# Patient Record
Sex: FEMALE | Race: BLACK OR AFRICAN AMERICAN | NOT HISPANIC OR LATINO | Employment: FULL TIME | URBAN - METROPOLITAN AREA
[De-identification: names, ages, dates, MRNs, and addresses within clinical notes are randomized per-mention and may not be internally consistent; named-entity substitution may affect disease eponyms.]

---
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
## 2020-06-19 ENCOUNTER — TRANSCRIBE ORDERS (OUTPATIENT)
Dept: ADMINISTRATIVE | Facility: HOSPITAL | Age: 20
End: 2020-06-19

## 2020-06-19 DIAGNOSIS — N63.0 LUMP OR MASS IN BREAST: Primary | ICD-10-CM

## 2020-06-26 ENCOUNTER — HOSPITAL ENCOUNTER (OUTPATIENT)
Dept: ULTRASOUND IMAGING | Facility: HOSPITAL | Age: 20
End: 2020-06-26

## 2020-07-07 ENCOUNTER — HOSPITAL ENCOUNTER (OUTPATIENT)
Dept: ULTRASOUND IMAGING | Facility: HOSPITAL | Age: 20
End: 2020-07-07

## 2020-08-11 ENCOUNTER — TRANSCRIBE ORDERS (OUTPATIENT)
Dept: LAB | Facility: HOSPITAL | Age: 20
End: 2020-08-11

## 2020-08-11 ENCOUNTER — LAB (OUTPATIENT)
Dept: LAB | Facility: HOSPITAL | Age: 20
End: 2020-08-11

## 2020-08-11 DIAGNOSIS — Z3A.08 8 WEEKS GESTATION OF PREGNANCY: Primary | ICD-10-CM

## 2020-08-11 DIAGNOSIS — Z34.81 PRENATAL CARE, SUBSEQUENT PREGNANCY, FIRST TRIMESTER: ICD-10-CM

## 2020-08-11 LAB
ABO GROUP BLD: NORMAL
AMPHET+METHAMPHET UR QL: NEGATIVE
AMPHETAMINES UR QL: NEGATIVE
BARBITURATES UR QL SCN: NEGATIVE
BASOPHILS # BLD AUTO: 0.04 10*3/MM3 (ref 0–0.2)
BASOPHILS NFR BLD AUTO: 0.4 % (ref 0–1.5)
BENZODIAZ UR QL SCN: NEGATIVE
BLD GP AB SCN SERPL QL: NEGATIVE
BUPRENORPHINE SERPL-MCNC: NEGATIVE NG/ML
CANNABINOIDS SERPL QL: POSITIVE
COCAINE UR QL: NEGATIVE
DEPRECATED RDW RBC AUTO: 38.2 FL (ref 37–54)
EOSINOPHIL # BLD AUTO: 0.1 10*3/MM3 (ref 0–0.4)
EOSINOPHIL NFR BLD AUTO: 1.1 % (ref 0.3–6.2)
ERYTHROCYTE [DISTWIDTH] IN BLOOD BY AUTOMATED COUNT: 11.9 % (ref 12.3–15.4)
GLUCOSE SERPL-MCNC: 69 MG/DL (ref 65–99)
HBV SURFACE AG SERPL QL IA: NORMAL
HCT VFR BLD AUTO: 37.3 % (ref 34–46.6)
HCV AB SER DONR QL: NORMAL
HGB BLD-MCNC: 12.7 G/DL (ref 12–15.9)
HIV1+2 AB SER QL: NORMAL
IMM GRANULOCYTES # BLD AUTO: 0.04 10*3/MM3 (ref 0–0.05)
IMM GRANULOCYTES NFR BLD AUTO: 0.4 % (ref 0–0.5)
LYMPHOCYTES # BLD AUTO: 1.96 10*3/MM3 (ref 0.7–3.1)
LYMPHOCYTES NFR BLD AUTO: 21.2 % (ref 19.6–45.3)
MCH RBC QN AUTO: 30 PG (ref 26.6–33)
MCHC RBC AUTO-ENTMCNC: 34 G/DL (ref 31.5–35.7)
MCV RBC AUTO: 88.2 FL (ref 79–97)
METHADONE UR QL SCN: NEGATIVE
MONOCYTES # BLD AUTO: 0.5 10*3/MM3 (ref 0.1–0.9)
MONOCYTES NFR BLD AUTO: 5.4 % (ref 5–12)
NEUTROPHILS NFR BLD AUTO: 6.62 10*3/MM3 (ref 1.7–7)
NEUTROPHILS NFR BLD AUTO: 71.5 % (ref 42.7–76)
NRBC BLD AUTO-RTO: 0.1 /100 WBC (ref 0–0.2)
OPIATES UR QL: NEGATIVE
OXYCODONE UR QL SCN: NEGATIVE
PCP UR QL SCN: NEGATIVE
PLATELET # BLD AUTO: 263 10*3/MM3 (ref 140–450)
PMV BLD AUTO: 10.9 FL (ref 6–12)
PROPOXYPH UR QL: NEGATIVE
RBC # BLD AUTO: 4.23 10*6/MM3 (ref 3.77–5.28)
RH BLD: POSITIVE
RPR SER QL: NORMAL
RUBV IGG SERPL IA-ACNC: NEGATIVE
TRICYCLICS UR QL SCN: NEGATIVE
TSH SERPL DL<=0.05 MIU/L-ACNC: 1.06 UIU/ML (ref 0.27–4.2)
WBC # BLD AUTO: 9.26 10*3/MM3 (ref 3.4–10.8)

## 2020-08-11 PROCEDURE — 80306 DRUG TEST PRSMV INSTRMNT: CPT | Performed by: ADVANCED PRACTICE MIDWIFE

## 2020-08-11 PROCEDURE — 82947 ASSAY GLUCOSE BLOOD QUANT: CPT | Performed by: ADVANCED PRACTICE MIDWIFE

## 2020-08-11 PROCEDURE — 84443 ASSAY THYROID STIM HORMONE: CPT | Performed by: ADVANCED PRACTICE MIDWIFE

## 2020-08-11 PROCEDURE — 80081 OBSTETRIC PANEL INC HIV TSTG: CPT | Performed by: ADVANCED PRACTICE MIDWIFE

## 2020-08-11 PROCEDURE — 36415 COLL VENOUS BLD VENIPUNCTURE: CPT | Performed by: ADVANCED PRACTICE MIDWIFE

## 2020-08-11 PROCEDURE — 86803 HEPATITIS C AB TEST: CPT | Performed by: ADVANCED PRACTICE MIDWIFE

## 2020-08-11 PROCEDURE — 81003 URINALYSIS AUTO W/O SCOPE: CPT | Performed by: ADVANCED PRACTICE MIDWIFE

## 2020-08-12 LAB
BILIRUB UR QL STRIP: NEGATIVE
CLARITY UR: CLEAR
COLOR UR: YELLOW
GLUCOSE UR STRIP-MCNC: NEGATIVE MG/DL
HGB UR QL STRIP.AUTO: NEGATIVE
HOLD SPECIMEN: NORMAL
KETONES UR QL STRIP: ABNORMAL
LEUKOCYTE ESTERASE UR QL STRIP.AUTO: NEGATIVE
NITRITE UR QL STRIP: NEGATIVE
PH UR STRIP.AUTO: 6 [PH] (ref 5–8)
PROT UR QL STRIP: ABNORMAL
SP GR UR STRIP: >=1.03 (ref 1–1.03)
UROBILINOGEN UR QL STRIP: ABNORMAL

## 2020-08-13 LAB
EXTERNAL CHLAMYDIA SCREEN: NEGATIVE
EXTERNAL GONORRHEA SCREEN: NEGATIVE

## 2020-09-05 ENCOUNTER — HOSPITAL ENCOUNTER (EMERGENCY)
Facility: HOSPITAL | Age: 20
Discharge: HOME | End: 2020-09-05
Attending: EMERGENCY MEDICINE | Admitting: EMERGENCY MEDICINE

## 2020-09-05 VITALS
BODY MASS INDEX: 28.16 KG/M2 | HEIGHT: 62 IN | TEMPERATURE: 98.1 F | HEART RATE: 85 BPM | DIASTOLIC BLOOD PRESSURE: 60 MMHG | SYSTOLIC BLOOD PRESSURE: 102 MMHG | RESPIRATION RATE: 16 BRPM | WEIGHT: 153 LBS | OXYGEN SATURATION: 99 %

## 2020-09-05 DIAGNOSIS — R19.7 DIARRHEA DURING PREGNANCY: Primary | ICD-10-CM

## 2020-09-05 DIAGNOSIS — O26.899 DIARRHEA DURING PREGNANCY: Primary | ICD-10-CM

## 2020-09-05 DIAGNOSIS — R19.7 BLOODY DIARRHEA: ICD-10-CM

## 2020-09-05 DIAGNOSIS — N39.0 ACUTE URINARY TRACT INFECTION: ICD-10-CM

## 2020-09-05 LAB
ALBUMIN SERPL-MCNC: 3.6 G/DL (ref 3.5–5.2)
ALBUMIN/GLOB SERPL: 1.1 G/DL
ALP SERPL-CCNC: 107 U/L (ref 39–117)
ALT SERPL W P-5'-P-CCNC: 12 U/L (ref 1–33)
ANION GAP SERPL CALCULATED.3IONS-SCNC: 11 MMOL/L (ref 5–15)
AST SERPL-CCNC: 21 U/L (ref 1–32)
BACTERIA UR QL AUTO: ABNORMAL /HPF
BASOPHILS # BLD AUTO: 0.03 10*3/MM3 (ref 0–0.2)
BASOPHILS NFR BLD AUTO: 0.2 % (ref 0–1.5)
BILIRUB SERPL-MCNC: 0.2 MG/DL (ref 0–1.2)
BILIRUB UR QL STRIP: NEGATIVE
BUN SERPL-MCNC: 9 MG/DL (ref 6–20)
BUN/CREAT SERPL: 13.6 (ref 7–25)
CALCIUM SPEC-SCNC: 9.8 MG/DL (ref 8.6–10.5)
CHLORIDE SERPL-SCNC: 102 MMOL/L (ref 98–107)
CLARITY UR: ABNORMAL
CO2 SERPL-SCNC: 23 MMOL/L (ref 22–29)
COLOR UR: ABNORMAL
CREAT SERPL-MCNC: 0.66 MG/DL (ref 0.57–1)
DEPRECATED RDW RBC AUTO: 39.5 FL (ref 37–54)
EOSINOPHIL # BLD AUTO: 0.09 10*3/MM3 (ref 0–0.4)
EOSINOPHIL NFR BLD AUTO: 0.6 % (ref 0.3–6.2)
ERYTHROCYTE [DISTWIDTH] IN BLOOD BY AUTOMATED COUNT: 12.3 % (ref 12.3–15.4)
GFR SERPL CREATININE-BSD FRML MDRD: 115 ML/MIN/1.73
GFR SERPL CREATININE-BSD FRML MDRD: 140 ML/MIN/1.73
GLOBULIN UR ELPH-MCNC: 3.3 GM/DL
GLUCOSE SERPL-MCNC: 85 MG/DL (ref 65–99)
GLUCOSE UR STRIP-MCNC: NEGATIVE MG/DL
HCT VFR BLD AUTO: 38 % (ref 34–46.6)
HGB BLD-MCNC: 13 G/DL (ref 12–15.9)
HGB UR QL STRIP.AUTO: ABNORMAL
HYALINE CASTS UR QL AUTO: ABNORMAL /LPF
IMM GRANULOCYTES # BLD AUTO: 0.05 10*3/MM3 (ref 0–0.05)
IMM GRANULOCYTES NFR BLD AUTO: 0.3 % (ref 0–0.5)
KETONES UR QL STRIP: ABNORMAL
LEUKOCYTE ESTERASE UR QL STRIP.AUTO: ABNORMAL
LYMPHOCYTES # BLD AUTO: 1.86 10*3/MM3 (ref 0.7–3.1)
LYMPHOCYTES NFR BLD AUTO: 12.6 % (ref 19.6–45.3)
MCH RBC QN AUTO: 29.8 PG (ref 26.6–33)
MCHC RBC AUTO-ENTMCNC: 34.2 G/DL (ref 31.5–35.7)
MCV RBC AUTO: 87.2 FL (ref 79–97)
MONOCYTES # BLD AUTO: 0.93 10*3/MM3 (ref 0.1–0.9)
MONOCYTES NFR BLD AUTO: 6.3 % (ref 5–12)
MUCOUS THREADS URNS QL MICRO: ABNORMAL /HPF
NEUTROPHILS NFR BLD AUTO: 11.77 10*3/MM3 (ref 1.7–7)
NEUTROPHILS NFR BLD AUTO: 80 % (ref 42.7–76)
NITRITE UR QL STRIP: NEGATIVE
NRBC BLD AUTO-RTO: 0 /100 WBC (ref 0–0.2)
PH UR STRIP.AUTO: 5.5 [PH] (ref 5–8)
PLATELET # BLD AUTO: 245 10*3/MM3 (ref 140–450)
PMV BLD AUTO: 9.9 FL (ref 6–12)
POTASSIUM SERPL-SCNC: 4.1 MMOL/L (ref 3.5–5.2)
PROT SERPL-MCNC: 6.9 G/DL (ref 6–8.5)
PROT UR QL STRIP: ABNORMAL
RBC # BLD AUTO: 4.36 10*6/MM3 (ref 3.77–5.28)
RBC # UR: ABNORMAL /HPF
REF LAB TEST METHOD: ABNORMAL
SODIUM SERPL-SCNC: 136 MMOL/L (ref 136–145)
SP GR UR STRIP: 1.04 (ref 1–1.03)
SQUAMOUS #/AREA URNS HPF: ABNORMAL /HPF
UROBILINOGEN UR QL STRIP: ABNORMAL
WBC # BLD AUTO: 14.73 10*3/MM3 (ref 3.4–10.8)
WBC UR QL AUTO: ABNORMAL /HPF

## 2020-09-05 PROCEDURE — 99284 EMERGENCY DEPT VISIT MOD MDM: CPT

## 2020-09-05 PROCEDURE — 25010000002 CEFTRIAXONE PER 250 MG: Performed by: EMERGENCY MEDICINE

## 2020-09-05 PROCEDURE — 80053 COMPREHEN METABOLIC PANEL: CPT | Performed by: EMERGENCY MEDICINE

## 2020-09-05 PROCEDURE — 81001 URINALYSIS AUTO W/SCOPE: CPT | Performed by: EMERGENCY MEDICINE

## 2020-09-05 PROCEDURE — 96365 THER/PROPH/DIAG IV INF INIT: CPT

## 2020-09-05 PROCEDURE — 85025 COMPLETE CBC W/AUTO DIFF WBC: CPT | Performed by: EMERGENCY MEDICINE

## 2020-09-05 PROCEDURE — 87086 URINE CULTURE/COLONY COUNT: CPT | Performed by: EMERGENCY MEDICINE

## 2020-09-05 RX ORDER — SODIUM CHLORIDE 0.9 % (FLUSH) 0.9 %
10 SYRINGE (ML) INJECTION AS NEEDED
Status: DISCONTINUED | OUTPATIENT
Start: 2020-09-05 | End: 2020-09-05 | Stop reason: HOSPADM

## 2020-09-05 RX ORDER — CEFDINIR 300 MG/1
300 CAPSULE ORAL 2 TIMES DAILY
Qty: 14 CAPSULE | Refills: 0 | Status: SHIPPED | OUTPATIENT
Start: 2020-09-05 | End: 2020-09-12

## 2020-09-05 RX ADMIN — SODIUM CHLORIDE 1000 ML: 9 INJECTION, SOLUTION INTRAVENOUS at 12:25

## 2020-09-05 RX ADMIN — CEFTRIAXONE SODIUM 2 G: 2 INJECTION, POWDER, FOR SOLUTION INTRAMUSCULAR; INTRAVENOUS at 12:24

## 2020-09-05 NOTE — DISCHARGE INSTRUCTIONS
The patient is advised to rest and drink plenty of fluids.    Take antibiotics as prescribed for treatment of urinary tract infection.    Return to the ER with any further concern or worsening of symptoms.

## 2020-09-05 NOTE — ED PROVIDER NOTES
Subjective   19-year-old female presents with a complaint of diarrhea and bloody appearance to the diarrhea.  The patient reports that beginning yesterday she had several episodes of diarrhea.  Throughout the night into this morning she started to notice some streaks of blood in the diarrhea.  This is just been present over the last 2 episodes.  She feels like the overall frequency of the diarrhea is actually decreasing.  She denies any significant abdominal pain.  She does report some hot sensation with urination but has been present for several weeks.  She reports that she was previously prescribed antibiotics for this but has not initiated those antibiotics.  She denies any vaginal bleeding or blood in her urine.  She denies any vaginal leakage of fluid.  She is currently G2, P1 at 11 weeks gestation with no current complications with her current pregnancy.  She denies any fever, body aches, or chills.  She denies any chest pain, cough, sore throat, congestion, rhinorrhea, shortness of breath, change in sense of taste or smell.  No previous surgical intervention of the abdomen.  No other reported aggravating, alleviating, or associated symptoms.          Review of Systems   Constitutional: Negative for chills, fatigue and fever.   HENT: Negative for congestion, ear pain, postnasal drip, sinus pressure and sore throat.    Eyes: Negative for pain, redness and visual disturbance.   Respiratory: Negative for cough, chest tightness and shortness of breath.    Cardiovascular: Negative for chest pain, palpitations and leg swelling.   Gastrointestinal: Positive for blood in stool and diarrhea. Negative for abdominal pain, anal bleeding, nausea and vomiting.   Endocrine: Negative for polydipsia and polyuria.   Genitourinary: Positive for dysuria. Negative for difficulty urinating, frequency and urgency.   Musculoskeletal: Negative for arthralgias, back pain and neck pain.   Skin: Negative for pallor and rash.    Allergic/Immunologic: Negative for environmental allergies and immunocompromised state.   Neurological: Negative for dizziness, weakness and headaches.   Hematological: Negative for adenopathy.   Psychiatric/Behavioral: Negative for confusion, self-injury and suicidal ideas. The patient is not nervous/anxious.    All other systems reviewed and are negative.      Past Medical History:   Diagnosis Date   • Chlamydia        No Known Allergies    History reviewed. No pertinent surgical history.    Family History   Problem Relation Age of Onset   • Diabetes Maternal Grandfather        Social History     Socioeconomic History   • Marital status: Single     Spouse name: Not on file   • Number of children: Not on file   • Years of education: Not on file   • Highest education level: Not on file   Tobacco Use   • Smoking status: Never Smoker   • Smokeless tobacco: Never Used   Substance and Sexual Activity   • Alcohol use: Yes     Frequency: 2-4 times a month   • Drug use: Yes     Types: Marijuana   • Sexual activity: Not Currently     Partners: Female, Male     Birth control/protection: IUD           Objective   Physical Exam   Constitutional: She is oriented to person, place, and time. She appears well-developed and well-nourished.  Non-toxic appearance. No distress.   HENT:   Head: Normocephalic and atraumatic.   Right Ear: External ear normal.   Left Ear: External ear normal.   Nose: Nose normal.   Eyes: Pupils are equal, round, and reactive to light. EOM and lids are normal.   Neck: Normal range of motion. Neck supple. No tracheal deviation present.   Cardiovascular: Normal rate, regular rhythm and normal heart sounds. Exam reveals no gallop, no friction rub and no decreased pulses.   No murmur heard.  Pulmonary/Chest: Effort normal and breath sounds normal. No respiratory distress. She has no decreased breath sounds. She has no wheezes. She has no rhonchi. She has no rales.   Abdominal: Soft. Normal appearance and  bowel sounds are normal. There is no tenderness. There is no rebound, no guarding, no tenderness at McBurney's point and negative Kim's sign.       Musculoskeletal: Normal range of motion. She exhibits no deformity.   Lymphadenopathy:     She has no cervical adenopathy.   Neurological: She is alert and oriented to person, place, and time. She has normal strength. No cranial nerve deficit or sensory deficit.   Skin: Skin is warm and dry. No rash noted. She is not diaphoretic.   Psychiatric: She has a normal mood and affect. Her speech is normal and behavior is normal. Judgment and thought content normal. Cognition and memory are normal.   Nursing note and vitals reviewed.      Procedures           ED Course                                           MDM  Number of Diagnoses or Management Options  Acute urinary tract infection: new and requires workup  Bloody diarrhea: new and requires workup  Diarrhea during pregnancy: new and requires workup  Diagnosis management comments: Urine shows bacteria, but also shows a significant number of squamous epithelial cells.  Given the fact that the patient is pregnant and has had dysuria for several weeks I feel it is appropriate to initiate a course of antibiotics.  I will prescribe Omnicef upon discharge.    Patient's vital signs have remained normal, oxygen saturation is normal, no respiratory symptoms.  She has not had any episodes of diarrhea since being here in the ER.  She does not appear anemic, and I do not feel more detail evaluation for diarrhea is warranted at this given time.  Felt to be of infectious colitis.      I discussed good oral fluid intake, rest, Tylenol as needed for pain, and using prescribed antibiotics as prescribed.    She is advised to follow-up with primary care physician/OB within the next 2 to 3 days.    Advised return to the ER with any further concern.           Amount and/or Complexity of Data Reviewed  Clinical lab tests: ordered and  reviewed  Review and summarize past medical records: yes  Independent visualization of images, tracings, or specimens: yes        Final diagnoses:   Diarrhea during pregnancy   Bloody diarrhea   Acute urinary tract infection            Black Montoya MD  09/05/20 [id number]

## 2020-09-06 LAB — BACTERIA SPEC AEROBE CULT: NO GROWTH

## 2020-09-08 ENCOUNTER — LAB REQUISITION (OUTPATIENT)
Dept: LAB | Facility: HOSPITAL | Age: 20
End: 2020-09-08

## 2020-09-08 DIAGNOSIS — Z00.00 ROUTINE GENERAL MEDICAL EXAMINATION AT A HEALTH CARE FACILITY: ICD-10-CM

## 2020-10-05 ENCOUNTER — OFFICE VISIT (OUTPATIENT)
Dept: OBSTETRICS AND GYNECOLOGY | Facility: CLINIC | Age: 20
End: 2020-10-05

## 2020-10-05 VITALS — RESPIRATION RATE: 14 BRPM | HEIGHT: 62 IN | WEIGHT: 152.8 LBS | BODY MASS INDEX: 28.12 KG/M2

## 2020-10-05 DIAGNOSIS — Z34.02 FIRST PREGNANCY, SECOND TRIMESTER: Primary | ICD-10-CM

## 2020-10-05 NOTE — PROGRESS NOTES
Patient is pregnant and seeing [facility].  She had an appointment today and is really not understand why she is here.  She was discharged without being seen.    Abdulkadir Mcallister MD

## 2020-10-08 ENCOUNTER — LAB (OUTPATIENT)
Dept: LAB | Facility: HOSPITAL | Age: 20
End: 2020-10-08

## 2020-10-08 ENCOUNTER — TRANSCRIBE ORDERS (OUTPATIENT)
Dept: LAB | Facility: HOSPITAL | Age: 20
End: 2020-10-08

## 2020-10-08 DIAGNOSIS — R30.0 DYSURIA: Primary | ICD-10-CM

## 2020-10-08 DIAGNOSIS — R30.0 DYSURIA: ICD-10-CM

## 2020-10-08 PROCEDURE — 87086 URINE CULTURE/COLONY COUNT: CPT

## 2020-10-09 LAB — BACTERIA SPEC AEROBE CULT: NO GROWTH

## 2020-11-16 ENCOUNTER — HOSPITAL ENCOUNTER (OUTPATIENT)
Facility: HOSPITAL | Age: 20
End: 2020-11-16
Attending: OBSTETRICS & GYNECOLOGY | Admitting: OBSTETRICS & GYNECOLOGY

## 2020-11-16 ENCOUNTER — HOSPITAL ENCOUNTER (OUTPATIENT)
Facility: HOSPITAL | Age: 20
Setting detail: OBSERVATION
Discharge: HOME | End: 2020-11-16
Attending: ADVANCED PRACTICE MIDWIFE | Admitting: OBSTETRICS & GYNECOLOGY

## 2020-11-16 ENCOUNTER — HOSPITAL ENCOUNTER (EMERGENCY)
Facility: HOSPITAL | Age: 20
End: 2020-11-16

## 2020-11-16 VITALS
SYSTOLIC BLOOD PRESSURE: 106 MMHG | WEIGHT: 154 LBS | HEIGHT: 62 IN | OXYGEN SATURATION: 98 % | DIASTOLIC BLOOD PRESSURE: 60 MMHG | TEMPERATURE: 98.2 F | RESPIRATION RATE: 16 BRPM | BODY MASS INDEX: 28.34 KG/M2 | HEART RATE: 77 BPM

## 2020-11-16 PROBLEM — Z34.90 PREGNANCY: Status: ACTIVE | Noted: 2020-11-16

## 2020-11-16 LAB
BACTERIA UR QL AUTO: ABNORMAL /HPF
BILIRUB UR QL STRIP: NEGATIVE
BILIRUB UR QL STRIP: NEGATIVE
CLARITY UR: ABNORMAL
CLARITY UR: CLEAR
COLOR UR: YELLOW
COLOR UR: YELLOW
GLUCOSE UR STRIP-MCNC: NEGATIVE MG/DL
GLUCOSE UR STRIP-MCNC: NEGATIVE MG/DL
HGB UR QL STRIP.AUTO: NEGATIVE
HGB UR QL STRIP.AUTO: NEGATIVE
HYALINE CASTS UR QL AUTO: ABNORMAL /LPF
KETONES UR QL STRIP: NEGATIVE
KETONES UR QL STRIP: NEGATIVE
LEUKOCYTE ESTERASE UR QL STRIP.AUTO: ABNORMAL
LEUKOCYTE ESTERASE UR QL STRIP.AUTO: NEGATIVE
NITRITE UR QL STRIP: NEGATIVE
NITRITE UR QL STRIP: NEGATIVE
PH UR STRIP.AUTO: 7.5 [PH] (ref 5–8)
PH UR STRIP.AUTO: >=9 [PH] (ref 5–8)
PROT UR QL STRIP: ABNORMAL
PROT UR QL STRIP: NEGATIVE
RBC # UR: ABNORMAL /HPF
REF LAB TEST METHOD: ABNORMAL
SP GR UR STRIP: 1.01 (ref 1–1.03)
SP GR UR STRIP: 1.03 (ref 1–1.03)
SQUAMOUS #/AREA URNS HPF: ABNORMAL /HPF
UROBILINOGEN UR QL STRIP: ABNORMAL
UROBILINOGEN UR QL STRIP: NORMAL
WBC UR QL AUTO: ABNORMAL /HPF

## 2020-11-16 PROCEDURE — 81003 URINALYSIS AUTO W/O SCOPE: CPT | Performed by: OBSTETRICS & GYNECOLOGY

## 2020-11-16 PROCEDURE — P9612 CATHETERIZE FOR URINE SPEC: HCPCS

## 2020-11-16 PROCEDURE — G0378 HOSPITAL OBSERVATION PER HR: HCPCS

## 2020-11-16 PROCEDURE — 81001 URINALYSIS AUTO W/SCOPE: CPT | Performed by: OBSTETRICS & GYNECOLOGY

## 2020-11-16 PROCEDURE — 59025 FETAL NON-STRESS TEST: CPT

## 2020-11-16 PROCEDURE — 99218 PR INITIAL OBSERVATION CARE/DAY 30 MINUTES: CPT | Performed by: OBSTETRICS & GYNECOLOGY

## 2020-11-16 PROCEDURE — 87086 URINE CULTURE/COLONY COUNT: CPT | Performed by: OBSTETRICS & GYNECOLOGY

## 2020-11-16 NOTE — H&P
[facility]  Obstetric History and Physical    Referring Provider: Kayleen Cabezas MD      Chief Complaint   Patient presents with   • Abdominal Pain       Subjective     Patient is a 19 y.o. female  currently at 22w1d, who presents with C/O abdominal pain.  She reports mild irregular lower abdominal pain/menstrual type cramping off and on during the pregnancy.  Patient denies leaking of fluid, vaginal bleeding, recent trauma, short of breath, chest pain, loss of taste or smell, or urinary symptoms.  Prenatal care by Bronwyn Manrique CNM.   care has been uncomplicated thus far.  OB history significant for previous term vaginal delivery..    .    The following portions of the patients history were reviewed and updated as appropriate: current medications, allergies, past medical history, past surgical history, past family history, past social history and problem list .       Prenatal Information:   Maternal Prenatal Labs  Blood Type No results found for: ABO   Rh Status No results found for: RH   Antibody Screen No results found for: ABSCRN   Gonnorhea No results found for: GCCX   Chlamydia No results found for: CLAMYDCU   RPR No results found for: RPR   Syphilis Antibody No results found for: SYPHILIS   Rubella No results found for: RUBELLAIGGIN   Hepatitis B Surface Antigen No results found for: HEPBSAG   HIV-1 Antibody No results found for: LABHIV1   Hepatitis C Antibody No results found for: HEPCAB   Rapid Urin Drug Screen No results found for: AMPMETHU, BARBITSCNUR, LABBENZSCN, LABMETHSCN, LABOPIASCN, THCURSCR, COCAINEUR, AMPHETSCREEN, PROPOXSCN, BUPRENORSCNU, METAMPSCNUR, OXYCODONESCN, TRICYCLICSCN   Group B Strep Culture No results found for: GBSANTIGEN           External Prenatal Results     Pregnancy Outside Results - Transcribed From Office Records - See Scanned Records For Details     Test Value Date Time    Hgb 13.0 g/dL 20 1224      12.7 g/dL 20 1256    Hct 38.0 % 20 1224       37.3 % 20 1256    ABO O  20 1256    Rh Positive  20 1256    Antibody Screen Negative  20 1256    Glucose Fasting GTT       Glucose Tolerance Test 1 hour       Glucose Tolerance Test 3 hour       Gonorrhea (discrete)       Chlamydia (discrete)       RPR Non-Reactive  20 1256    VDRL       Syphilis Antibody       Rubella Negative  20 1256    HBsAg Non-Reactive  20 1256    Herpes Simplex Virus PCR       Herpes Simplex VIrus Culture       HIV Non-Reactive  20 1256    Hep C RNA Quant PCR       Hep C Antibody Non-Reactive  20 1256    AFP       Group B Strep       GBS Susceptibility to Clindamycin       GBS Susceptibility to Erythromycin       Fetal Fibronectin       Genetic Testing, Maternal Blood             Drug Screening     Test Value Date Time    Urine Drug Screen       Amphetamine Screen Negative  20 1256    Barbiturate Screen Negative  20 1256    Benzodiazepine Screen Negative  20 1256    Methadone Screen Negative  20 1256    Phencyclidine Screen Negative  20 1256    Opiates Screen Negative  20 1256    THC Screen Positive  20 1256    Cocaine Screen       Propoxyphene Screen Negative  20 1256    Buprenorphine Screen Negative  20 1256    Methamphetamine Screen       Oxycodone Screen Negative  20 1256    Tricyclic Antidepressants Screen Negative  20 1256                  Past OB History:       OB History    Para Term  AB Living   2 1 1 0 0 1   SAB TAB Ectopic Molar Multiple Live Births   0 0 0 0 0 1      # Outcome Date GA Lbr Dorian/2nd Weight Sex Delivery Anes PTL Lv   2 Current            1 Term 10/16/15   3289 g (7 lb 4 oz) M Vag-Spont EPI  ERAN      Name: Gianluca       Past Medical History: Past Medical History:   Diagnosis Date   • Chlamydia    • Gonorrhea       Past Surgical History History reviewed. No pertinent surgical history.   Family History: Family History   Problem Relation  Age of Onset   • Diabetes Maternal Grandfather       Social History:  reports that she has never smoked. She has never used smokeless tobacco.   reports previous alcohol use.   reports current drug use. Drug: Marijuana.                   General ROS Negative Findings:Headaches, Visual Changes, Epigastric pain, Anorexia, Nausia/Vomiting, ROM and Vaginal Bleeding    ROS     All other systems have been reviewed and are neg  Objective       Vital Signs Range for the last 24 hours  Temperature: Temp:  [98.2 °F (36.8 °C)] 98.2 °F (36.8 °C)   Temp Source: Temp src: Oral   BP: BP: (106)/(60) 106/60   Pulse: Heart Rate:  [77] 77   Respirations: Resp:  [16] 16   SPO2: SpO2:  [98 %] 98 %   O2 Amount (l/min):     O2 Devices Device (Oxygen Therapy): room air   Weight: Weight:  [69.9 kg (154 lb)] 69.9 kg (154 lb)     Physical Examination:   General:   alert, appears stated age and cooperative   Skin:   normal   HEENT:  Sclera clear   Lungs:   clear to auscultation bilaterally   Heart:   regular rate and rhythm, S1, S2 normal, no murmur, click, rub or gallop   Abdomen:  Soft, gravid uterus, no guarding, rebound, negative CVA tenderness.  Bilateral round ligament tenderness elicited.  Benign exam   Lower Extremities  no edema, no calf tenderness, full range of motion   Pelvis:  External genitalia: normal general appearance  Uterus: enlarged     Neuro: No focal deficits noted DTR 2+4 no clonus    Presentation:    Cervix: Exam by: Method: sterile exam per physician(Dr. Carrasco)   Dilation:  Closed   Effacement:  Take/firm   Station:  Not engaged  No blood noted on glove       Fetal Heart Rate Assessment   Method: Fetal HR Assessment Method: external   Beats/min: Fetal HR (beats/min): 140   Baseline: Fetal Heart Baseline Rate: normal range   Varibility: Fetal HR Variability: moderate (amplitude range 6 to 25 bpm)   Accels: Fetal HR Accelerations: greater than/equal to 10 bpm (32 wks gest or less), lasts at least 10 seconds (32 wks  gest or less)   Decels: Fetal HR Decelerations: absent   Tracing Category:       Uterine Assessment   Method: Method: palpation   Frequency (min):     Ctx Count in 10 min:     Duration:     Intensity: Contraction Intensity: no contractions   Intensity by IUPC:     Resting Tone: Uterine Resting Tone: soft by palpation   Resting Tone by IUPC:     Barton Units:       Laboratory Results:   Lab Results (last 24 hours)     Procedure Component Value Units Date/Time    Urinalysis With Culture If Indicated - Urine, Catheter [[id number]]  (Normal) Collected: 11/16/20 1454    Specimen: Urine, Catheter Updated: 11/16/20 1513     Color, UA Yellow     Appearance, UA Clear     pH, UA 7.5     Specific Gravity, UA 1.013     Glucose, UA Negative     Ketones, UA Negative     Bilirubin, UA Negative     Blood, UA Negative     Protein, UA Negative     Leuk Esterase, UA Negative     Nitrite, UA Negative     Urobilinogen, UA 0.2 E.U./dL    Narrative:      Urine microscopic not indicated.    Urinalysis With Culture If Indicated - Urine, Clean Catch [[id number]]  (Abnormal) Collected: 11/16/20 1405    Specimen: Urine, Clean Catch Updated: 11/16/20 1441     Color, UA Yellow     Appearance, UA Turbid     pH, UA >=9.0     Specific Gravity, UA 1.028     Glucose, UA Negative     Ketones, UA Negative     Bilirubin, UA Negative     Blood, UA Negative     Protein, UA 30 mg/dL (1+)     Leuk Esterase, UA Small (1+)     Nitrite, UA Negative     Urobilinogen, UA 1.0 E.U./dL    Urinalysis, Microscopic Only - Urine, Clean Catch [[id number]]  (Abnormal) Collected: 11/16/20 1405    Specimen: Urine, Clean Catch Updated: 11/16/20 1441     RBC, UA 0-2 /HPF      WBC, UA 6-12 /HPF      Bacteria, UA 2+ /HPF      Squamous Epithelial Cells, UA 7-12 /HPF      Hyaline Casts, UA 0-6 /LPF      Methodology Automated Microscopy    Urine Culture - Urine, Urine, Clean Catch [[id number]] Collected: 11/16/20 1405    Specimen: Urine, Clean Catch Updated: 11/16/20 1441             Radiology Review:   Imaging Results (Last 24 Hours)     ** No results found for the last 24 hours. **        Other Studies: Repeat urine catheterized within normal limits    Assessment/Plan       Pregnancy        Assessment:  1.  Intrauterine pregnancy at 22w1d weeks gestation with reassuring fetal status.    2.  No evidence of labor rupture membranes  3.  Discomforts of pregnancy  4.  No evidence of UTI    Plan:  1.  Discharged home, kick count,  instructions, discussed patient nutrition, hydration, and activity.  Follow-up with OB provider routinely.  2. Plan of care has been reviewed with patient.  3.  Risks, benefits of treatment plan have been discussed.  4.  All questions have been answered.  5      Justin Carrasco, DO  2020  16:03 EST

## 2020-11-17 LAB — BACTERIA SPEC AEROBE CULT: NO GROWTH

## 2020-12-31 ENCOUNTER — TRANSCRIBE ORDERS (OUTPATIENT)
Dept: LAB | Facility: HOSPITAL | Age: 20
End: 2020-12-31

## 2020-12-31 ENCOUNTER — LAB (OUTPATIENT)
Dept: LAB | Facility: HOSPITAL | Age: 20
End: 2020-12-31

## 2020-12-31 DIAGNOSIS — Z34.82 PRENATAL CARE, SUBSEQUENT PREGNANCY, SECOND TRIMESTER: Primary | ICD-10-CM

## 2020-12-31 DIAGNOSIS — Z3A.28 28 WEEKS GESTATION OF PREGNANCY: ICD-10-CM

## 2020-12-31 DIAGNOSIS — Z34.82 PRENATAL CARE, SUBSEQUENT PREGNANCY, SECOND TRIMESTER: ICD-10-CM

## 2020-12-31 LAB
BLD GP AB SCN SERPL QL: NEGATIVE
DEPRECATED RDW RBC AUTO: 40.5 FL (ref 37–54)
ERYTHROCYTE [DISTWIDTH] IN BLOOD BY AUTOMATED COUNT: 12.2 % (ref 12.3–15.4)
EXTERNAL GTT 1 HOUR: 83
GLUCOSE 1H P 100 G GLC PO SERPL-MCNC: 83 MG/DL (ref 65–140)
HCT VFR BLD AUTO: 35.8 % (ref 34–46.6)
HGB BLD-MCNC: 12 G/DL (ref 12–15.9)
MCH RBC QN AUTO: 30 PG (ref 26.6–33)
MCHC RBC AUTO-ENTMCNC: 33.5 G/DL (ref 31.5–35.7)
MCV RBC AUTO: 89.5 FL (ref 79–97)
PLATELET # BLD AUTO: 261 10*3/MM3 (ref 140–450)
PMV BLD AUTO: 10.1 FL (ref 6–12)
RBC # BLD AUTO: 4 10*6/MM3 (ref 3.77–5.28)
WBC # BLD AUTO: 10.91 10*3/MM3 (ref 3.4–10.8)

## 2020-12-31 PROCEDURE — 86850 RBC ANTIBODY SCREEN: CPT

## 2020-12-31 PROCEDURE — 82950 GLUCOSE TEST: CPT

## 2020-12-31 PROCEDURE — 36415 COLL VENOUS BLD VENIPUNCTURE: CPT

## 2020-12-31 PROCEDURE — 85027 COMPLETE CBC AUTOMATED: CPT

## 2021-02-17 ENCOUNTER — LAB (OUTPATIENT)
Dept: LAB | Facility: HOSPITAL | Age: 21
End: 2021-02-17

## 2021-02-17 ENCOUNTER — TRANSCRIBE ORDERS (OUTPATIENT)
Dept: LAB | Facility: HOSPITAL | Age: 21
End: 2021-02-17

## 2021-02-17 DIAGNOSIS — Z34.83 PRENATAL CARE, SUBSEQUENT PREGNANCY, THIRD TRIMESTER: ICD-10-CM

## 2021-02-17 DIAGNOSIS — Z3A.34 34 WEEKS GESTATION OF PREGNANCY: ICD-10-CM

## 2021-02-17 DIAGNOSIS — R35.0 URINARY FREQUENCY: Primary | ICD-10-CM

## 2021-02-17 DIAGNOSIS — R35.0 URINARY FREQUENCY: ICD-10-CM

## 2021-02-17 LAB
BACTERIA UR QL AUTO: ABNORMAL /HPF
BILIRUB UR QL STRIP: NEGATIVE
BILIRUB UR QL STRIP: NEGATIVE
CLARITY UR: ABNORMAL
CLARITY UR: ABNORMAL
COLOR UR: YELLOW
COLOR UR: YELLOW
GLUCOSE UR STRIP-MCNC: NEGATIVE MG/DL
GLUCOSE UR STRIP-MCNC: NEGATIVE MG/DL
HGB UR QL STRIP.AUTO: NEGATIVE
HGB UR QL STRIP.AUTO: NEGATIVE
HYALINE CASTS UR QL AUTO: ABNORMAL /LPF
KETONES UR QL STRIP: ABNORMAL
KETONES UR QL STRIP: ABNORMAL
LEUKOCYTE ESTERASE UR QL STRIP.AUTO: ABNORMAL
LEUKOCYTE ESTERASE UR QL STRIP.AUTO: ABNORMAL
NITRITE UR QL STRIP: NEGATIVE
NITRITE UR QL STRIP: NEGATIVE
PH UR STRIP.AUTO: 6.5 [PH] (ref 5–8)
PH UR STRIP.AUTO: 6.5 [PH] (ref 5–8)
PROT UR QL STRIP: ABNORMAL
PROT UR QL STRIP: ABNORMAL
RBC # UR: ABNORMAL /HPF
REF LAB TEST METHOD: ABNORMAL
SP GR UR STRIP: 1.02 (ref 1–1.03)
SP GR UR STRIP: 1.02 (ref 1–1.03)
SQUAMOUS #/AREA URNS HPF: ABNORMAL /HPF
UROBILINOGEN UR QL STRIP: ABNORMAL
UROBILINOGEN UR QL STRIP: ABNORMAL
WBC UR QL AUTO: ABNORMAL /HPF

## 2021-02-17 PROCEDURE — 87086 URINE CULTURE/COLONY COUNT: CPT

## 2021-02-17 PROCEDURE — 81001 URINALYSIS AUTO W/SCOPE: CPT

## 2021-02-18 LAB — BACTERIA SPEC AEROBE CULT: ABNORMAL

## 2021-02-25 LAB — EXTERNAL GROUP B STREP ANTIGEN: NEGATIVE

## 2021-03-20 ENCOUNTER — HOSPITAL ENCOUNTER (INPATIENT)
Facility: HOSPITAL | Age: 21
LOS: 2 days | Discharge: HOME | End: 2021-03-22
Attending: ADVANCED PRACTICE MIDWIFE | Admitting: OBSTETRICS & GYNECOLOGY

## 2021-03-20 ENCOUNTER — ANESTHESIA EVENT (OUTPATIENT)
Dept: LABOR AND DELIVERY | Facility: HOSPITAL | Age: 21
End: 2021-03-20

## 2021-03-20 ENCOUNTER — ANESTHESIA (OUTPATIENT)
Dept: LABOR AND DELIVERY | Facility: HOSPITAL | Age: 21
End: 2021-03-20

## 2021-03-20 PROBLEM — Z37.9 NORMAL LABOR: Status: ACTIVE | Noted: 2021-03-20

## 2021-03-20 LAB
ABO GROUP BLD: NORMAL
ALP SERPL-CCNC: 248 U/L (ref 39–117)
ALT SERPL W P-5'-P-CCNC: 21 U/L (ref 1–33)
AST SERPL-CCNC: 27 U/L (ref 1–32)
BILIRUB SERPL-MCNC: 0.2 MG/DL (ref 0–1.2)
BLD GP AB SCN SERPL QL: NEGATIVE
CREAT SERPL-MCNC: 0.61 MG/DL (ref 0.57–1)
DEPRECATED RDW RBC AUTO: 43.8 FL (ref 37–54)
ERYTHROCYTE [DISTWIDTH] IN BLOOD BY AUTOMATED COUNT: 13.4 % (ref 12.3–15.4)
HCT VFR BLD AUTO: 37.1 % (ref 34–46.6)
HGB BLD-MCNC: 12.3 G/DL (ref 12–15.9)
LDH SERPL-CCNC: 470 U/L (ref 135–214)
MCH RBC QN AUTO: 29.6 PG (ref 26.6–33)
MCHC RBC AUTO-ENTMCNC: 33.2 G/DL (ref 31.5–35.7)
MCV RBC AUTO: 89.2 FL (ref 79–97)
PLATELET # BLD AUTO: 237 10*3/MM3 (ref 140–450)
PMV BLD AUTO: 10.1 FL (ref 6–12)
RBC # BLD AUTO: 4.16 10*6/MM3 (ref 3.77–5.28)
RH BLD: POSITIVE
SARS-COV-2 RDRP RESP QL NAA+PROBE: NORMAL
T&S EXPIRATION DATE: NORMAL
URATE SERPL-MCNC: 3.7 MG/DL (ref 2.4–5.7)
WBC # BLD AUTO: 15.92 10*3/MM3 (ref 3.4–10.8)

## 2021-03-20 PROCEDURE — 84460 ALANINE AMINO (ALT) (SGPT): CPT | Performed by: OBSTETRICS & GYNECOLOGY

## 2021-03-20 PROCEDURE — 87635 SARS-COV-2 COVID-19 AMP PRB: CPT | Performed by: OBSTETRICS & GYNECOLOGY

## 2021-03-20 PROCEDURE — 86900 BLOOD TYPING SEROLOGIC ABO: CPT | Performed by: OBSTETRICS & GYNECOLOGY

## 2021-03-20 PROCEDURE — 83615 LACTATE (LD) (LDH) ENZYME: CPT | Performed by: OBSTETRICS & GYNECOLOGY

## 2021-03-20 PROCEDURE — 82565 ASSAY OF CREATININE: CPT | Performed by: OBSTETRICS & GYNECOLOGY

## 2021-03-20 PROCEDURE — 4A1HX4Z MONITORING OF PRODUCTS OF CONCEPTION, CARDIAC ELECTRICAL ACTIVITY, EXTERNAL APPROACH: ICD-10-PCS | Performed by: NURSE PRACTITIONER

## 2021-03-20 PROCEDURE — 59025 FETAL NON-STRESS TEST: CPT

## 2021-03-20 PROCEDURE — 25010000002 BUTORPHANOL PER 1 MG: Performed by: OBSTETRICS & GYNECOLOGY

## 2021-03-20 PROCEDURE — 84550 ASSAY OF BLOOD/URIC ACID: CPT | Performed by: OBSTETRICS & GYNECOLOGY

## 2021-03-20 PROCEDURE — 85027 COMPLETE CBC AUTOMATED: CPT | Performed by: OBSTETRICS & GYNECOLOGY

## 2021-03-20 PROCEDURE — 51703 INSERT BLADDER CATH COMPLEX: CPT

## 2021-03-20 PROCEDURE — C1755 CATHETER, INTRASPINAL: HCPCS

## 2021-03-20 PROCEDURE — C1755 CATHETER, INTRASPINAL: HCPCS | Performed by: ANESTHESIOLOGY

## 2021-03-20 PROCEDURE — 25010000002 ROPIVACAINE PER 1 MG: Performed by: ANESTHESIOLOGY

## 2021-03-20 PROCEDURE — 86850 RBC ANTIBODY SCREEN: CPT | Performed by: OBSTETRICS & GYNECOLOGY

## 2021-03-20 PROCEDURE — 25010000002 FENTANYL CITRATE (PF) 100 MCG/2ML SOLUTION: Performed by: ANESTHESIOLOGY

## 2021-03-20 PROCEDURE — 84450 TRANSFERASE (AST) (SGOT): CPT | Performed by: OBSTETRICS & GYNECOLOGY

## 2021-03-20 PROCEDURE — 82247 BILIRUBIN TOTAL: CPT | Performed by: OBSTETRICS & GYNECOLOGY

## 2021-03-20 PROCEDURE — 84075 ASSAY ALKALINE PHOSPHATASE: CPT | Performed by: OBSTETRICS & GYNECOLOGY

## 2021-03-20 PROCEDURE — 86901 BLOOD TYPING SEROLOGIC RH(D): CPT | Performed by: OBSTETRICS & GYNECOLOGY

## 2021-03-20 RX ORDER — SODIUM CHLORIDE 0.9 % (FLUSH) 0.9 %
10 SYRINGE (ML) INJECTION AS NEEDED
Status: DISCONTINUED | OUTPATIENT
Start: 2021-03-20 | End: 2021-03-20 | Stop reason: HOSPADM

## 2021-03-20 RX ORDER — DOCUSATE SODIUM 100 MG/1
100 CAPSULE, LIQUID FILLED ORAL 2 TIMES DAILY
Status: DISCONTINUED | OUTPATIENT
Start: 2021-03-20 | End: 2021-03-22 | Stop reason: HOSPADM

## 2021-03-20 RX ORDER — METOCLOPRAMIDE HYDROCHLORIDE 5 MG/ML
10 INJECTION INTRAMUSCULAR; INTRAVENOUS ONCE AS NEEDED
Status: DISCONTINUED | OUTPATIENT
Start: 2021-03-20 | End: 2021-03-20 | Stop reason: HOSPADM

## 2021-03-20 RX ORDER — ONDANSETRON 4 MG/1
4 TABLET, FILM COATED ORAL EVERY 6 HOURS PRN
Status: DISCONTINUED | OUTPATIENT
Start: 2021-03-20 | End: 2021-03-20 | Stop reason: HOSPADM

## 2021-03-20 RX ORDER — SODIUM CHLORIDE 0.9 % (FLUSH) 0.9 %
1-10 SYRINGE (ML) INJECTION AS NEEDED
Status: DISCONTINUED | OUTPATIENT
Start: 2021-03-20 | End: 2021-03-21

## 2021-03-20 RX ORDER — ROPIVACAINE HYDROCHLORIDE 2 MG/ML
15 INJECTION, SOLUTION EPIDURAL; INFILTRATION; PERINEURAL CONTINUOUS
Status: DISCONTINUED | OUTPATIENT
Start: 2021-03-20 | End: 2021-03-20

## 2021-03-20 RX ORDER — SODIUM CHLORIDE, SODIUM LACTATE, POTASSIUM CHLORIDE, CALCIUM CHLORIDE 600; 310; 30; 20 MG/100ML; MG/100ML; MG/100ML; MG/100ML
125 INJECTION, SOLUTION INTRAVENOUS CONTINUOUS
Status: DISCONTINUED | OUTPATIENT
Start: 2021-03-20 | End: 2021-03-20

## 2021-03-20 RX ORDER — ONDANSETRON 2 MG/ML
4 INJECTION INTRAMUSCULAR; INTRAVENOUS EVERY 6 HOURS PRN
Status: DISCONTINUED | OUTPATIENT
Start: 2021-03-20 | End: 2021-03-20 | Stop reason: HOSPADM

## 2021-03-20 RX ORDER — OXYTOCIN-SODIUM CHLORIDE 0.9% IV SOLN 30 UNIT/500ML 30-0.9/5 UT/ML-%
85 SOLUTION INTRAVENOUS ONCE
Status: DISCONTINUED | OUTPATIENT
Start: 2021-03-20 | End: 2021-03-20 | Stop reason: HOSPADM

## 2021-03-20 RX ORDER — HYDROCORTISONE 25 MG/G
1 CREAM TOPICAL AS NEEDED
Status: DISCONTINUED | OUTPATIENT
Start: 2021-03-20 | End: 2021-03-22 | Stop reason: HOSPADM

## 2021-03-20 RX ORDER — IBUPROFEN 600 MG/1
600 TABLET ORAL EVERY 6 HOURS PRN
Status: DISCONTINUED | OUTPATIENT
Start: 2021-03-20 | End: 2021-03-22 | Stop reason: HOSPADM

## 2021-03-20 RX ORDER — METHYLERGONOVINE MALEATE 0.2 MG/ML
200 INJECTION INTRAVENOUS ONCE AS NEEDED
Status: DISCONTINUED | OUTPATIENT
Start: 2021-03-20 | End: 2021-03-20 | Stop reason: HOSPADM

## 2021-03-20 RX ORDER — ONDANSETRON 2 MG/ML
4 INJECTION INTRAMUSCULAR; INTRAVENOUS ONCE AS NEEDED
Status: DISCONTINUED | OUTPATIENT
Start: 2021-03-20 | End: 2021-03-20 | Stop reason: HOSPADM

## 2021-03-20 RX ORDER — CARBOPROST TROMETHAMINE 250 UG/ML
250 INJECTION, SOLUTION INTRAMUSCULAR AS NEEDED
Status: DISCONTINUED | OUTPATIENT
Start: 2021-03-20 | End: 2021-03-20 | Stop reason: HOSPADM

## 2021-03-20 RX ORDER — EPHEDRINE SULFATE 5 MG/ML
10 INJECTION INTRAVENOUS
Status: DISCONTINUED | OUTPATIENT
Start: 2021-03-20 | End: 2021-03-20 | Stop reason: HOSPADM

## 2021-03-20 RX ORDER — SODIUM CHLORIDE 0.9 % (FLUSH) 0.9 %
3 SYRINGE (ML) INJECTION EVERY 12 HOURS SCHEDULED
Status: DISCONTINUED | OUTPATIENT
Start: 2021-03-20 | End: 2021-03-20 | Stop reason: HOSPADM

## 2021-03-20 RX ORDER — LIDOCAINE HYDROCHLORIDE AND EPINEPHRINE 15; 5 MG/ML; UG/ML
INJECTION, SOLUTION EPIDURAL AS NEEDED
Status: DISCONTINUED | OUTPATIENT
Start: 2021-03-20 | End: 2021-03-20 | Stop reason: SURG

## 2021-03-20 RX ORDER — FAMOTIDINE 10 MG/ML
20 INJECTION, SOLUTION INTRAVENOUS ONCE AS NEEDED
Status: DISCONTINUED | OUTPATIENT
Start: 2021-03-20 | End: 2021-03-20 | Stop reason: HOSPADM

## 2021-03-20 RX ORDER — ONDANSETRON 2 MG/ML
4 INJECTION INTRAMUSCULAR; INTRAVENOUS EVERY 6 HOURS PRN
Status: DISCONTINUED | OUTPATIENT
Start: 2021-03-20 | End: 2021-03-22 | Stop reason: HOSPADM

## 2021-03-20 RX ORDER — ACETAMINOPHEN 325 MG/1
650 TABLET ORAL EVERY 4 HOURS PRN
Status: DISCONTINUED | OUTPATIENT
Start: 2021-03-20 | End: 2021-03-20 | Stop reason: HOSPADM

## 2021-03-20 RX ORDER — OXYTOCIN-SODIUM CHLORIDE 0.9% IV SOLN 30 UNIT/500ML 30-0.9/5 UT/ML-%
650 SOLUTION INTRAVENOUS ONCE
Status: DISCONTINUED | OUTPATIENT
Start: 2021-03-20 | End: 2021-03-20 | Stop reason: HOSPADM

## 2021-03-20 RX ORDER — LIDOCAINE HYDROCHLORIDE 10 MG/ML
5 INJECTION, SOLUTION EPIDURAL; INFILTRATION; INTRACAUDAL; PERINEURAL AS NEEDED
Status: DISCONTINUED | OUTPATIENT
Start: 2021-03-20 | End: 2021-03-20 | Stop reason: HOSPADM

## 2021-03-20 RX ORDER — MAGNESIUM CARB/ALUMINUM HYDROX 105-160MG
30 TABLET,CHEWABLE ORAL ONCE
Status: DISCONTINUED | OUTPATIENT
Start: 2021-03-20 | End: 2021-03-20 | Stop reason: HOSPADM

## 2021-03-20 RX ORDER — MISOPROSTOL 200 UG/1
800 TABLET ORAL AS NEEDED
Status: DISCONTINUED | OUTPATIENT
Start: 2021-03-20 | End: 2021-03-20 | Stop reason: HOSPADM

## 2021-03-20 RX ORDER — DIPHENHYDRAMINE HYDROCHLORIDE 50 MG/ML
12.5 INJECTION INTRAMUSCULAR; INTRAVENOUS EVERY 8 HOURS PRN
Status: DISCONTINUED | OUTPATIENT
Start: 2021-03-20 | End: 2021-03-20 | Stop reason: HOSPADM

## 2021-03-20 RX ORDER — LANOLIN
CREAM (ML) TOPICAL
Status: DISCONTINUED | OUTPATIENT
Start: 2021-03-20 | End: 2021-03-22 | Stop reason: HOSPADM

## 2021-03-20 RX ORDER — BUTORPHANOL TARTRATE 1 MG/ML
1 INJECTION, SOLUTION INTRAMUSCULAR; INTRAVENOUS
Status: DISCONTINUED | OUTPATIENT
Start: 2021-03-20 | End: 2021-03-20 | Stop reason: HOSPADM

## 2021-03-20 RX ORDER — BISACODYL 10 MG
10 SUPPOSITORY, RECTAL RECTAL DAILY PRN
Status: DISCONTINUED | OUTPATIENT
Start: 2021-03-21 | End: 2021-03-22 | Stop reason: HOSPADM

## 2021-03-20 RX ORDER — TRISODIUM CITRATE DIHYDRATE AND CITRIC ACID MONOHYDRATE 500; 334 MG/5ML; MG/5ML
30 SOLUTION ORAL ONCE
Status: DISCONTINUED | OUTPATIENT
Start: 2021-03-20 | End: 2021-03-20 | Stop reason: HOSPADM

## 2021-03-20 RX ORDER — FENTANYL CITRATE 50 UG/ML
INJECTION, SOLUTION INTRAMUSCULAR; INTRAVENOUS AS NEEDED
Status: DISCONTINUED | OUTPATIENT
Start: 2021-03-20 | End: 2021-03-20 | Stop reason: SURG

## 2021-03-20 RX ADMIN — LIDOCAINE HYDROCHLORIDE AND EPINEPHRINE 2 ML: 15; 5 INJECTION, SOLUTION EPIDURAL at 08:52

## 2021-03-20 RX ADMIN — BUTORPHANOL TARTRATE 2 MG: 2 INJECTION, SOLUTION INTRAMUSCULAR; INTRAVENOUS at 05:51

## 2021-03-20 RX ADMIN — FENTANYL CITRATE 100 MCG: 50 INJECTION, SOLUTION INTRAMUSCULAR; INTRAVENOUS at 08:56

## 2021-03-20 RX ADMIN — LIDOCAINE HYDROCHLORIDE AND EPINEPHRINE 3 ML: 15; 5 INJECTION, SOLUTION EPIDURAL at 08:50

## 2021-03-20 RX ADMIN — Medication: at 17:28

## 2021-03-20 RX ADMIN — SODIUM CHLORIDE, POTASSIUM CHLORIDE, SODIUM LACTATE AND CALCIUM CHLORIDE 1000 ML: 600; 310; 30; 20 INJECTION, SOLUTION INTRAVENOUS at 08:24

## 2021-03-20 RX ADMIN — ROPIVACAINE HYDROCHLORIDE 15 ML/HR: 2 INJECTION, SOLUTION EPIDURAL; INFILTRATION at 08:59

## 2021-03-20 RX ADMIN — ROPIVACAINE HYDROCHLORIDE 10 ML: 5 INJECTION, SOLUTION EPIDURAL; INFILTRATION; PERINEURAL at 08:53

## 2021-03-20 RX ADMIN — IBUPROFEN 600 MG: 600 TABLET, FILM COATED ORAL at 18:44

## 2021-03-20 RX ADMIN — SODIUM CHLORIDE, POTASSIUM CHLORIDE, SODIUM LACTATE AND CALCIUM CHLORIDE 125 ML/HR: 600; 310; 30; 20 INJECTION, SOLUTION INTRAVENOUS at 05:35

## 2021-03-20 RX ADMIN — DOCUSATE SODIUM 100 MG: 100 CAPSULE, LIQUID FILLED ORAL at 21:06

## 2021-03-20 RX ADMIN — HYDROCORTISONE 2.5% 1 APPLICATION: 25 CREAM TOPICAL at 17:28

## 2021-03-20 RX ADMIN — WITCH HAZEL 1 PAD: 500 SOLUTION RECTAL; TOPICAL at 17:28

## 2021-03-20 NOTE — ANESTHESIA PROCEDURE NOTES
Labor Epidural      Patient reassessed immediately prior to procedure    Patient location during procedure: OB  Performed By  Anesthesiologist: Amanda Busby DO  Preanesthetic Checklist  Completed: patient identified, IV checked, risks and benefits discussed, surgical consent, monitors and equipment checked, pre-op evaluation and timeout performed  Additional Notes  CSE performed using 25g Goldy  Prep:  Pt Position:sitting  Sterile Tech:cap, gloves, mask and sterile barrier  Prep:DuraPrep  Monitoring:blood pressure monitoring  Epidural Block Procedure:  Approach:midline  Guidance:palpation technique  Location:L3-L4  Needle Type:Tuohy  Needle Gauge:17 G  Loss of Resistance Medium: air  Loss of Resistance: 5cm  Cath Depth at skin:12 cm  Paresthesia: none  Aspiration:negative  Test Dose:negative  Number of Attempts: 1  Post Assessment:  Dressing:occlusive dressing applied and secured with tape  Pt Tolerance:patient tolerated the procedure well with no apparent complications  Complications:no

## 2021-03-20 NOTE — PROGRESS NOTES
[facility]  Obstetric Progress Note    Subjective     Patient:    Sleeping, easily aroused.  Requests to delay SVE and AROM so that she may rest    Objective     Vital Signs Range for the last 24 hours  Temp:  [97.8 °F (36.6 °C)-98.2 °F (36.8 °C)] 97.8 °F (36.6 °C)   Temp src: Axillary   BP: ()/(52-73) 104/55   Heart Rate:  [] 96   Resp:  [16-18] 16               Weight:  [80.3 kg (177 lb)] 80.3 kg (177 lb)       Intake/Output this shift:    No intake/output data recorded.    Physical Exam:      Abdomen Abdominal exam: soft, nontender, nondistended, no masses or organomegaly.   Extremities Exam of extremities: peripheral pulses normal, no pedal edema, no clubbing or cyanosis     Presentation: vertex   Cervix: Exam by: Method: sterile exam per RN   Dilation:   5-6   Effacement: Cervical Effacement: 50-60%   Station:           Fetal Heart Rate Assessment   Method: Fetal HR Assessment Method: external   Beats/min: Fetal HR (beats/min): 125   Baseline: Fetal Heart Baseline Rate: normal range   Varibility: Fetal HR Variability: moderate (amplitude range 6 to 25 bpm)   Accels: Fetal HR Accelerations: absent   Decels: Fetal HR Decelerations: early   Tracing Category:       Uterine Assessment   Method: Method: external tocotransducer   Frequency (min): Contraction Frequency (Minutes): 4   Ctx Count in 10 min:     Duration:     Intensity: Contraction Intensity: moderate by palpation   Intensity by IUPC:     Resting Tone: Uterine Resting Tone: soft by palpation   Resting Tone by IUPC:     Rachael Units:         Assessment/Plan       Normal labor        Assessment:  1.  Intrauterine pregnancy at 39w0d weeks gestation with reactive fetal status.    2.  labor  without ROM  3.  Obstetrical history significant for is non-contributory.  4.  GBS status: No results found for: GBSANTIGEN    Plan:  1. Continue observation  2.  Repeat SVE every 2-4 hours or prn  3.   Plan of care has been reviewed with patient and she  verbalizes understanding  4.  Risks, benefits of treatment plan have been discussed.  5.  All questions have been answered.      Queenie Huynh CNM  3/20/2021  10:11 EDT

## 2021-03-20 NOTE — PROGRESS NOTES
[facility]  Obstetric Progress Note    Subjective     Patient:    Resting without complaints    Objective     Vital Signs Range for the last 24 hours  Temp:  [96.2 °F (35.7 °C)-98.2 °F (36.8 °C)] 96.2 °F (35.7 °C)   Temp src: Axillary   BP: ()/(52-73) 110/60   Heart Rate:  [] 86   Resp:  [16-18] 16               Weight:  [80.3 kg (177 lb)] 80.3 kg (177 lb)       Intake/Output this shift:    No intake/output data recorded.    Physical Exam:      Abdomen Abdominal exam: soft, nontender, nondistended, no masses or organomegaly.   Extremities Exam of extremities: peripheral pulses normal, no pedal edema, no clubbing or cyanosis     Presentation: vertex   Cervix: Exam by: Method: sterile exam per RN   Dilation:   5   Effacement: Cervical Effacement: 50-60%   Station:  0         Fetal Heart Rate Assessment   Method: Fetal HR Assessment Method: external   Beats/min: Fetal HR (beats/min): 120   Baseline: Fetal Heart Baseline Rate: normal range   Varibility: Fetal HR Variability: moderate (amplitude range 6 to 25 bpm)   Accels: Fetal HR Accelerations: greater than/equal to 15 bpm, lasting at least 15 seconds   Decels: Fetal HR Decelerations: absent   Tracing Category:       Uterine Assessment   Method: Method: external tocotransducer   Frequency (min): Contraction Frequency (Minutes): 5-6   Ctx Count in 10 min:     Duration:     Intensity: Contraction Intensity: moderate by palpation   Intensity by IUPC:     Resting Tone: Uterine Resting Tone: soft by palpation   Resting Tone by IUPC:     Rachael Units:         Assessment/Plan       Normal labor        Assessment:  1.  Intrauterine pregnancy at 39w0d weeks gestation with reactive fetal status.    2.  labor  with ROM  3.  Obstetrical history significant for is non-contributory.  4.  GBS status: No results found for: GBSANTIGEN    Plan:  1. Continue observation  2.  Repeat SVE every 2-4 hours or prn  3.   Plan of care has been reviewed with patient and she  verbalizes understanding  4.  Risks, benefits of treatment plan have been discussed.  5.  All questions have been answered.        Queenie Huynh CNM  3/20/2021  12:34 EDT

## 2021-03-20 NOTE — L&D DELIVERY NOTE
[facility]  Vaginal Delivery Note    Delivery     Delivery: Vaginal, Spontaneous  Z3A.[id number]   YOB: 2021    Time of Birth: 2:26 PM      Anesthesia: Epidural     Delivering clinician: Queenie Huynh    Forceps?   No   Vacuum? No    Shoulder dystocia present: No        Delivery narrative:  Notified by RN at 1420 that patient was 10/100/+1 station. The patient was prepped for delivery and positioned in stirrups by the RN.  Without maternal pushing effort, the infant crowned and delivered over an intact perineum in one contraction with RN in attendance.   Spontaneous cry was noted.  Infant placed on maternal abdomen and dried. I arrived at bedside at this time and scrubbed in.  The cord was doubly clamped and cut.  The placenta delivered spontaneously and visually intact.  The perineum and vagina were inspected for lacerations and none found. All counts were correct.  Mom and baby entered recovery in stable condition.     Infant    Findings: male  infant, unnamed     Infant observations: Weight: No birth weight on file.   Length:   in  Observations/Comments:        Apgars: 9  @ 1 minute /    9  @ 5 minutes         Placenta, Cord, and Fluid    Placenta delivered  Spontaneous  at  3/20/2021  2:33 PM     Cord: 3 vessels  present.   Nuchal Cord?  no   Cord blood obtained: Yes    Cord gases obtained:    No     Repair    Episiotomy: No   Lacerations: No   Estimated Blood Loss: 150  mls.   Suture used for repair: NA         Complications  none    Disposition  Mother to Mother Baby/Postpartum  in stable condition currently.  Baby to remains with mom  in stable condition currently.      Queenie Huynh CNM  03/20/21  14:48 EDT

## 2021-03-20 NOTE — ANESTHESIA PREPROCEDURE EVALUATION
Anesthesia Evaluation     Patient summary reviewed and Nursing notes reviewed   NPO Solid Status: > 8 hours  NPO Liquid Status: > 8 hours           Airway   Mallampati: II  TM distance: >3 FB  Neck ROM: full  Possible difficult intubation  Dental      Pulmonary - negative pulmonary ROS   Cardiovascular - negative cardio ROS        Neuro/Psych- negative ROS  GI/Hepatic/Renal/Endo - negative ROS     Musculoskeletal (-) negative ROS    Abdominal    Substance History - negative use     OB/GYN    (+) Pregnant,         Other - negative ROS                     Anesthesia Plan    ASA 2     epidural       Anesthetic plan, all risks, benefits, and alternatives have been provided, discussed and informed consent has been obtained with: patient.

## 2021-03-20 NOTE — H&P
LUIS Rothman  Obstetric History and Physical    No chief complaint on file.      Subjective     Patient is a 20 y.o. female  currently at 39w0d, who presents with labor    Her prenatal care is benign.  Her previous obstetric/gynecological history is noted for is non-contributory.    The following portions of the patients history were reviewed and updated as appropriate: current medications, allergies, past medical history, past surgical history, past family history, past social history and problem list .       Prenatal Information:  Prenatal Results     POC Urine Glucose/Protein     Test Value Reference Range Date Time    Urine Glucose        Urine Protein              Initial Prenatal Labs     Test Value Reference Range Date Time    Hemoglobin  13.0 g/dL 12.0 - 15.9 20 1224       12.7 g/dL 12.0 - 15.9 20 1256    Hematocrit  38.0 % 34.0 - 46.6 20 1224       37.3 % 34.0 - 46.6 20 1256    Platelets  237 10*3/mm3 140 - 450 21 0533       261 10*3/mm3 140 - 450 20 1012       245 10*3/mm3 140 - 450 20 1224       263 10*3/mm3 140 - 450 20 1256    Rubella IgG  Negative   20 1256    Hepatitis B SAg  Non-Reactive  Non-Reactive 20 1256    Hepatitis C Ab  Non-Reactive  Non-Reactive 20 1256    RPR  Non-Reactive  Non-Reactive 20 1256    ABO  O   21 0533    Rh  Positive   21 0533    Antibody Screen  Negative   20 1256    HIV  Non-Reactive  Non-Reactive 20 1256    Urine Culture  >100,000 CFU/mL Lactobacillus species   21 1047       No growth   20 1405       No growth   10/08/20 1327       No growth   20 1045    Gonorrhea ^ Negative   20     Chlamydia ^ Negative   20     TSH  1.060 uIU/mL 0.270 - 4.200 20 1256          2nd and 3rd Trimester     Test Value Reference Range Date Time    Hemoglobin (repeated)  12.3 g/dL 12.0 - 15.9 21 0533       12.0 g/dL 12.0 - 15.9 20 1012    Hematocrit  (repeated)  37.1 % 34.0 - 46.6 03/20/21 0533       35.8 % 34.0 - 46.6 12/31/20 1012    GCT  83 mg/dL 65 - 140 12/31/20 1012    Antibody Screen (repeated)  Negative   03/20/21 0533       Negative   12/31/20 1012    GTT Fasting        GTT 1 Hr ^ 83   12/31/20     GTT 2 Hr        GTT 3 Hr        Group B Strep ^ Negative   02/25/21           Drug Screening     Test Value Reference Range Date Time    Amphetamine Screen  Negative  Negative 08/11/20 1256    Barbiturate Screen  Negative  Negative 08/11/20 1256    Benzodiazepine Screen  Negative  Negative 08/11/20 1256    Methadone Screen  Negative  Negative 08/11/20 1256    Phencyclidine Screen  Negative  Negative 08/11/20 1256    Opiates Screen  Negative  Negative 08/11/20 1256    THC Screen  Positive  Negative 08/11/20 1256    Cocaine Screen  Negative  Negative 08/11/20 1256    Propoxyphene Screen  Negative  Negative 08/11/20 1256    Buprenorphine Screen  Negative  Negative 08/11/20 1256    Methamphetamine Screen  Negative  Negative 08/11/20 1256    Oxycodone Screen  Negative  Negative 08/11/20 1256    Tricyclic Antidepressants Screen  Negative  Negative 08/11/20 1256          Other (Risk screening)     Test Value Reference Range Date Time    Varicella IgG        Parvovirus IgG        CMV IgG        Cystic Fibrosis        Hemoglobin electrophoresis        NIPT        MSAFP-4        AFP (for NTD only)              Legend    ^: Historical                      External Prenatal Results     Pregnancy Outside Results - Transcribed From Office Records - See Scanned Records For Details     Test Value Date Time    Hgb  12.3 g/dL 03/20/21 0533       12.0 g/dL 12/31/20 1012       13.0 g/dL 09/05/20 1224       12.7 g/dL 08/11/20 1256    Hct  37.1 % 03/20/21 0533       35.8 % 12/31/20 1012       38.0 % 09/05/20 1224       37.3 % 08/11/20 1256    ABO  O  03/20/21 0533    Rh  Positive  03/20/21 0533    Antibody Screen  Negative  03/20/21 0533       Negative  12/31/20 1012        Negative  20 1256    Glucose Fasting GTT       Glucose Tolerance Test 1 hour ^ 83  20     Glucose Tolerance Test 3 hour       Gonorrhea (discrete) ^ Negative  20     Chlamydia (discrete) ^ Negative  20     RPR  Non-Reactive  20 1256    VDRL       Syphilis Antibody       Rubella  Negative  20 1256    HBsAg  Non-Reactive  20 1256    Herpes Simplex Virus PCR       Herpes Simplex VIrus Culture       HIV  Non-Reactive  20 1256    Hep C RNA Quant PCR       Hep C Antibody  Non-Reactive  20 1256    AFP       Group B Strep ^ Negative  21     GBS Susceptibility to Clindamycin       GBS Susceptibility to Erythromycin       Fetal Fibronectin       Genetic Testing, Maternal Blood             Drug Screening     Test Value Date Time    Urine Drug Screen       Amphetamine Screen  Negative  20 1256    Barbiturate Screen  Negative  20 1256    Benzodiazepine Screen  Negative  20 1256    Methadone Screen  Negative  20 1256    Phencyclidine Screen  Negative  20 1256    Opiates Screen  Negative  20 1256    THC Screen  Positive  20 1256    Cocaine Screen       Propoxyphene Screen  Negative  20 1256    Buprenorphine Screen  Negative  20 1256    Methamphetamine Screen       Oxycodone Screen  Negative  20 1256    Tricyclic Antidepressants Screen  Negative  20 1256          Legend    ^: Historical                         Past OB History:     OB History    Para Term  AB Living   2 1 1 0 0 1   SAB TAB Ectopic Molar Multiple Live Births   0 0 0 0 0 1      # Outcome Date GA Lbr Dorian/2nd Weight Sex Delivery Anes PTL Lv   2 Current            1 Term 10/16/15   3289 g (7 lb 4 oz) M Vag-Spont EPI  ERAN      Name: Gianluca       Past Medical History: Past Medical History:   Diagnosis Date   • Chlamydia     a year ago, treated   • Gonorrhea     a year ago, treated      Past Surgical History Past Surgical History:    Procedure Laterality Date   • WISDOM TOOTH EXTRACTION        Family History: Family History   Problem Relation Age of Onset   • Diabetes Maternal Grandfather       Social History:  reports that she has never smoked. She has never used smokeless tobacco.   reports previous alcohol use.   reports current drug use. Drug: Marijuana.        Review of Systems   Constitutional: Negative.    HENT: Negative.    Eyes: Negative.    Respiratory: Negative.    Cardiovascular: Negative.    Gastrointestinal: Negative.    Endocrine: Negative.    Genitourinary: Negative.    Musculoskeletal: Negative.    Skin: Negative.    Allergic/Immunologic: Negative.    Neurological: Negative.    Hematological: Negative.    Psychiatric/Behavioral: Negative.          Objective     Vital Signs Range for the last 24 hours  Temperature: Temp:  [97.8 °F (36.6 °C)-98.2 °F (36.8 °C)] 97.8 °F (36.6 °C)   Temp Source: Temp src: Axillary   BP: BP: ()/(52-73) 101/59   Pulse: Heart Rate:  [] 87   Respirations: Resp:  [16-18] 16   SPO2:     O2 Amount (l/min):     O2 Devices     Weight: Weight:  [80.3 kg (177 lb)] 80.3 kg (177 lb)     Physical Examination: General appearance - alert, well appearing, and in no distress  Neck - supple, no significant adenopathy  Chest - clear to auscultation, no wheezes, rales or rhonchi, symmetric air entry  Heart - normal rate, regular rhythm, normal S1, S2, no murmurs, rubs, clicks or gallops  Abdomen - soft, nontender, nondistended, no masses or organomegaly  Extremities - peripheral pulses normal, no pedal edema, no clubbing or cyanosis    Presentation: vertex   Cervix: Exam by:   rn    Dilation: Cervical Dilation (cm): 5-6   Effacement: Cervical Effacement: 50-60%   Station: Fetal Station: 0-->-3     Fetal Heart Rate Assessment   Method: Fetal HR Assessment Method: external   Beats/min: Fetal HR (beats/min): 125   Baseline: Fetal Heart Baseline Rate: normal range   Variability: Fetal HR Variability: moderate  (amplitude range 6 to 25 bpm)   Accels: Fetal HR Accelerations: greater than/equal to 15 bpm, lasting at least 15 seconds   Decels: Fetal HR Decelerations: absent   Tracing Category:       Uterine Assessment   Method: Method: external tocotransducer   Frequency (min): Contraction Frequency (Minutes): 4   Ctx Count in 10 min: Contractions in 10 Minutes: 1   Duration:     Intensity: Contraction Intensity: moderate by palpation   Intensity by IUPC:     Resting Tone: Uterine Resting Tone: soft by palpation   Resting Tone by IUPC:     Rachael Units:           Assessment/Plan       Normal labor      Assessment & Plan    Assessment:  1.  Intrauterine pregnancy at 39w0d gestation with reactive fetal status.    2.  labor  without ROM  3.  Obstetrical history significant for is non-contributory.  4.  GBS status:   External Strep Group B Ag   Date Value Ref Range Status   02/25/2021 Negative  Final       Plan:  1. fetal and uterine monitoring  continuously, expectant management and analgesia with  epidural  2. Plan of care has been reviewed with patient and she verbalizes understanding  3.  Risks, benefits of treatment plan have been discussed.  4.  All questions have been answered.        Queenie Huynh CNM  3/20/2021  11:21 EDT

## 2021-03-21 LAB
BASOPHILS # BLD AUTO: 0.04 10*3/MM3 (ref 0–0.2)
BASOPHILS NFR BLD AUTO: 0.3 % (ref 0–1.5)
DEPRECATED RDW RBC AUTO: 44.2 FL (ref 37–54)
EOSINOPHIL # BLD AUTO: 0.33 10*3/MM3 (ref 0–0.4)
EOSINOPHIL NFR BLD AUTO: 2.4 % (ref 0.3–6.2)
ERYTHROCYTE [DISTWIDTH] IN BLOOD BY AUTOMATED COUNT: 13.3 % (ref 12.3–15.4)
HCT VFR BLD AUTO: 36.4 % (ref 34–46.6)
HGB BLD-MCNC: 11.7 G/DL (ref 12–15.9)
IMM GRANULOCYTES # BLD AUTO: 0.08 10*3/MM3 (ref 0–0.05)
IMM GRANULOCYTES NFR BLD AUTO: 0.6 % (ref 0–0.5)
LYMPHOCYTES # BLD AUTO: 2.81 10*3/MM3 (ref 0.7–3.1)
LYMPHOCYTES NFR BLD AUTO: 20.1 % (ref 19.6–45.3)
MCH RBC QN AUTO: 29.3 PG (ref 26.6–33)
MCHC RBC AUTO-ENTMCNC: 32.1 G/DL (ref 31.5–35.7)
MCV RBC AUTO: 91 FL (ref 79–97)
MONOCYTES # BLD AUTO: 0.89 10*3/MM3 (ref 0.1–0.9)
MONOCYTES NFR BLD AUTO: 6.4 % (ref 5–12)
NEUTROPHILS NFR BLD AUTO: 70.2 % (ref 42.7–76)
NEUTROPHILS NFR BLD AUTO: 9.83 10*3/MM3 (ref 1.7–7)
NRBC BLD AUTO-RTO: 0 /100 WBC (ref 0–0.2)
PLATELET # BLD AUTO: 216 10*3/MM3 (ref 140–450)
PMV BLD AUTO: 10 FL (ref 6–12)
RBC # BLD AUTO: 4 10*6/MM3 (ref 3.77–5.28)
WBC # BLD AUTO: 13.98 10*3/MM3 (ref 3.4–10.8)

## 2021-03-21 PROCEDURE — 85025 COMPLETE CBC W/AUTO DIFF WBC: CPT | Performed by: NURSE PRACTITIONER

## 2021-03-21 RX ADMIN — IBUPROFEN 600 MG: 600 TABLET, FILM COATED ORAL at 06:47

## 2021-03-21 RX ADMIN — IBUPROFEN 600 MG: 600 TABLET, FILM COATED ORAL at 00:20

## 2021-03-21 RX ADMIN — IBUPROFEN 600 MG: 600 TABLET, FILM COATED ORAL at 18:40

## 2021-03-21 RX ADMIN — DOCUSATE SODIUM 100 MG: 100 CAPSULE, LIQUID FILLED ORAL at 21:05

## 2021-03-21 RX ADMIN — IBUPROFEN 600 MG: 600 TABLET, FILM COATED ORAL at 12:40

## 2021-03-21 RX ADMIN — DOCUSATE SODIUM 100 MG: 100 CAPSULE, LIQUID FILLED ORAL at 08:30

## 2021-03-21 NOTE — PLAN OF CARE
Problem: Breastfeeding  Goal: Effective Breastfeeding  Outcome: Ongoing, Progressing  Intervention: Promote Effective Breastfeeding  Flowsheets (Taken 3/20/2021 1910)  Breastfeeding Assistance:   feeding on demand promoted   feeding cue recognition promoted   assisted with positioning   support offered   nipple shield utilized  Parent/Child Attachment Promotion:   caring behavior modeled   cue recognition promoted   face-to-face positioning promoted   participation in care promoted   skin-to-skin contact encouraged   positive reinforcement provided   strengths emphasized  Intervention: Support Exclusive Breastfeeding Success  Flowsheets (Taken 3/20/2021 1910)  Supportive Measures: active listening utilized  Breastfeeding Support:   encouragement provided   diary/feeding log utilized   lactation counseling provided   Goal Outcome Evaluation:

## 2021-03-21 NOTE — PROGRESS NOTES
[facility]    Subjective     Doing well, pain controlled, lochia less than menses      Objective     Vital Signs Range for the last 24 hours  Temperature: Temp:  [96.2 °F (35.7 °C)-98.3 °F (36.8 °C)] 98.1 °F (36.7 °C)   Temp Source: Temp src: Oral   BP: BP: ()/() 106/55   Pulse: Heart Rate:  [] 60   Respirations: Resp:  [16] 16   SPO2:     O2 Amount (l/min):     O2 Devices           Physical Exam:  General:  no acute distresss.  Abdomen: Soft, non-tender, fundus firm  Extremities: normal, atraumatic, no cyanosis, and trace edema.       Lab results reviewed:  Yes    Lab Results   Component Value Date    WBC 13.98 (H) 2021    HGB 11.7 (L) 2021    HCT 36.4 2021    MCV 91.0 2021     2021         Assessment/Plan       Normal labor     (normal spontaneous vaginal delivery)      Alaina Kennedy is Day 1  post-partum       Plan:  Continue current care.      Queenie Huynh CNM  3/21/2021  11:12 EDT

## 2021-03-21 NOTE — ANESTHESIA POSTPROCEDURE EVALUATION
Patient: Alaina Kennedy    Procedure Summary     Date: 03/20/21 Room / Location:     Anesthesia Start: 0842 Anesthesia Stop: 1433    Procedure: LABOR ANALGESIA Diagnosis:     Scheduled Providers:  Provider: Amanda Busby DO    Anesthesia Type: epidural ASA Status: 2          Anesthesia Type: epidural    Vitals  Vitals Value Taken Time   /55 03/21/21 0700   Temp 98.1 °F (36.7 °C) 03/21/21 0700   Pulse 60 03/21/21 0700   Resp 16 03/21/21 0700   SpO2             Post Anesthesia Care and Evaluation    Patient location during evaluation: bedside  Patient participation: complete - patient participated  Level of consciousness: awake  Pain score: 0  Pain management: satisfactory to patient  Airway patency: patent  Anesthetic complications: No anesthetic complications  PONV Status: none  Cardiovascular status: acceptable and hemodynamically stable  Respiratory status: acceptable  Hydration status: acceptable  Post Neuraxial Block status: Motor and sensory function returned to baseline and No signs or symptoms of PDPH

## 2021-03-21 NOTE — LACTATION NOTE
03/20/21 1910   Maternal Information   Person Making Referral nurse   Maternal Reason for Referral   (mom  1st baby x3 weeks; wants to do 6 weeks)   Infant Reason for Referral   (low blood sugar and cold--formula)   Maternal Assessment   Breast Size Issue none   Breast Shape Bilateral:;round   Breast Density Right:;soft   Nipples Bilateral:;everted   Maternal Infant Feeding   Maternal Emotional State receptive;tense   Infant Positioning clutch/football   Signs of Milk Transfer   (too sleepy to latch)   Comfort Measures Before/During Feeding infant position adjusted;maternal position adjusted  (small nipple shield & formula)   Latch Assistance full assistance needed   Milk Expression/Equipment   Breast Pump Type double electric, personal  (has pump at home)   Helped mom with position and discussed good latch. Encouraged skin to skin. Baby did not breastfeed. Demonstrated how to pace feed formula. Teaching done as documented under Education. To call lactation services, if there are questions or concerns.

## 2021-03-22 VITALS
HEART RATE: 62 BPM | TEMPERATURE: 98.3 F | SYSTOLIC BLOOD PRESSURE: 106 MMHG | RESPIRATION RATE: 16 BRPM | BODY MASS INDEX: 32.57 KG/M2 | HEIGHT: 62 IN | WEIGHT: 177 LBS | DIASTOLIC BLOOD PRESSURE: 56 MMHG

## 2021-03-22 PROBLEM — Z37.9 NORMAL LABOR: Status: RESOLVED | Noted: 2021-03-20 | Resolved: 2021-03-22

## 2021-03-22 PROBLEM — Z34.90 PREGNANCY: Status: RESOLVED | Noted: 2020-11-16 | Resolved: 2021-03-22

## 2021-03-22 PROCEDURE — 90471 IMMUNIZATION ADMIN: CPT | Performed by: ADVANCED PRACTICE MIDWIFE

## 2021-03-22 PROCEDURE — 90707 MMR VACCINE SC: CPT | Performed by: ADVANCED PRACTICE MIDWIFE

## 2021-03-22 PROCEDURE — 25010000002 MEASLES, MUMPS & RUBELLA VAC RECONSTITUTED SOLUTION: Performed by: ADVANCED PRACTICE MIDWIFE

## 2021-03-22 RX ORDER — IBUPROFEN 600 MG/1
600 TABLET ORAL EVERY 6 HOURS PRN
Qty: 30 TABLET | Refills: 1 | Status: SHIPPED | OUTPATIENT
Start: 2021-03-22 | End: 2021-07-24

## 2021-03-22 RX ADMIN — MEASLES, MUMPS, AND RUBELLA VIRUS VACCINE LIVE 0.5 ML: 1000; 12500; 1000 INJECTION, POWDER, LYOPHILIZED, FOR SUSPENSION SUBCUTANEOUS at 14:14

## 2021-03-22 RX ADMIN — DOCUSATE SODIUM 100 MG: 100 CAPSULE, LIQUID FILLED ORAL at 08:53

## 2021-03-22 RX ADMIN — IBUPROFEN 600 MG: 600 TABLET, FILM COATED ORAL at 06:02

## 2021-03-22 NOTE — DISCHARGE SUMMARY
Stephan  Delivery Discharge Summary    Primary OB Clinician:     EDC: Estimated Date of Delivery: 3/27/21    Gestational Age:39w0d    Date of Admission: 3/20/2021    Admission Diagnosis:      Discharge Diagnosis: Same    Date of Delivery: 3/20/2021   Time of Delivery: 2:26 PM     Delivered By:  Queenie Huynh     Delivery Type: Vaginal, Spontaneous          Baby:male  infant;   Apgar:  9  @ 1 minute /   Apgar:  9  @ 5 minutes   Weight: 2490 g (5 lb 7.8 oz)    Length: 18     Anesthesia: Epidural      Laceration: No    Exam:  Normal postpartum exam    Hospital Course:  Hospital course has been stable.  Patient is tolerating po, voiding without difficulty and ready for discharge.  Please see medication reconciliation and lab report for additional details.      Follow Up:  Patient has been given a follow up appointment in our office.     Discharge Date: 3/22/2021; Discharge Time: 09:00 EDT    Danay Lares CNM   08:59 EDT   21

## 2021-03-22 NOTE — PAYOR COMM NOTE
"Jose Elias Klein (20 y.o. Female)     [facility] ID#[id number]    Delivery information.    From:Joslyn Grijalva  #[phone]  Fax#[phone]      Date of Birth Social Security Number Address Home Phone MRN    2000  [address] [facility] [address] [place], [place] [address] [phone] [id number]    [employer] Mar[employer] Status          None Single       Admission Date Admission Type Admitting Provider Attending Provider Department, Room/Bed    3/20/21 Elective Carolina Membreno MD S[place], Iniko Z, CNM [facility] MOTHER BABY 4A, N407/1    Discharge Date Discharge Disposition Discharge Destination                       Attending Provider: Jian Dowell CNM    Allergies: No Known Allergies    Isolation: None   Infection: None   Code Status: CPR    Ht: 157.5 cm (62\")   Wt: 80.3 kg (177 lb)    Admission Cmt: None   Principal Problem: None                Active Insurance as of 3/20/2021     Primary Coverage     Payor Plan Insurance Group Employer/Plan Group    [employer] [place] [employer]      Payor Plan Address Payor Plan Phone Number Payor Plan Fax Number Effective Dates    [address] [address] [phone]  2020 - None Entered    [facility] [id number]       Subscriber Name Subscriber Birth Date Member ID       LAITH KLEINYLYN ALEJANDRO 2000 [id number]                 Emergency Contacts      (Rel.) Home Phone Work Phone Mobile Phone     (Mother) [phone] -- --            Insurance Information                [facility]/WELLCARE MEDICAID Phone: [phone]    Subscriber: Jose Elias Klein Subscriber#: [id number]    Group#:  Precert#:           Problem List         Codes Noted - Resolved       Hospital    Normal labor ICD-10-CM: O80, Z37.9  ICD-9-CM: 650 3/20/2021 - Present     (normal spontaneous vaginal delivery) ICD-10-CM: O80  ICD-9-CM: 650 3/20/2021 - Present       Non-Hospital    Pregnancy ICD-10-CM: Z34.90  ICD-9-CM: V22.2 2020 - Present           "   History & Physical      Queenie Huynh CNM at 21 1121          [facility]  Obstetric History and Physical    No chief complaint on file.      Subjective     Patient is a 20 y.o. female  currently at 39w0d, who presents with labor    Her prenatal care is benign.  Her previous obstetric/gynecological history is noted for is non-contributory.    The following portions of the patients history were reviewed and updated as appropriate: current medications, allergies, past medical history, past surgical history, past family history, past social history and problem list .       Prenatal Information:  Prenatal Results     POC Urine Glucose/Protein     Test Value Reference Range Date Time    Urine Glucose        Urine Protein              Initial Prenatal Labs     Test Value Reference Range Date Time    Hemoglobin  13.0 g/dL 12.0 - 15.9 20 1224       12.7 g/dL 12.0 - 15.9 20 1256    Hematocrit  38.0 % 34.0 - 46.6 20 1224       37.3 % 34.0 - 46.6 20 1256    Platelets  237 10*3/mm3 140 - 450 21 0533       261 10*3/mm3 140 - 450 20 1012       245 10*3/mm3 140 - 450 20 1224       263 10*3/mm3 140 - 450 20 1256    Rubella IgG  Negative   20 1256    Hepatitis B SAg  Non-Reactive  Non-Reactive 20 1256    Hepatitis C Ab  Non-Reactive  Non-Reactive 20 1256    RPR  Non-Reactive  Non-Reactive 20 1256    ABO  O   21 0533    Rh  Positive   21 0533    Antibody Screen  Negative   20 1256    HIV  Non-Reactive  Non-Reactive 20 1256    Urine Culture  >100,000 CFU/mL Lactobacillus species   21 1047       No growth   20 1405       No growth   10/08/20 1327       No growth   20 1045    Gonorrhea ^ Negative   20     Chlamydia ^ Negative   20     TSH  1.060 uIU/mL 0.270 - 4.200 20 1256          2nd and 3rd Trimester     Test Value Reference Range Date Time    Hemoglobin (repeated)  12.3 g/dL  12.0 - 15.9 03/20/21 0533       12.0 g/dL 12.0 - 15.9 12/31/20 1012    Hematocrit (repeated)  37.1 % 34.0 - 46.6 03/20/21 0533       35.8 % 34.0 - 46.6 12/31/20 1012    GCT  83 mg/dL 65 - 140 12/31/20 1012    Antibody Screen (repeated)  Negative   03/20/21 0533       Negative   12/31/20 1012    GTT Fasting        GTT 1 Hr ^ 83   12/31/20     GTT 2 Hr        GTT 3 Hr        Group B Strep ^ Negative   02/25/21           Drug Screening     Test Value Reference Range Date Time    Amphetamine Screen  Negative  Negative 08/11/20 1256    Barbiturate Screen  Negative  Negative 08/11/20 1256    Benzodiazepine Screen  Negative  Negative 08/11/20 1256    Methadone Screen  Negative  Negative 08/11/20 1256    Phencyclidine Screen  Negative  Negative 08/11/20 1256    Opiates Screen  Negative  Negative 08/11/20 1256    THC Screen  Positive  Negative 08/11/20 1256    Cocaine Screen  Negative  Negative 08/11/20 1256    Propoxyphene Screen  Negative  Negative 08/11/20 1256    Buprenorphine Screen  Negative  Negative 08/11/20 1256    Methamphetamine Screen  Negative  Negative 08/11/20 1256    Oxycodone Screen  Negative  Negative 08/11/20 1256    Tricyclic Antidepressants Screen  Negative  Negative 08/11/20 1256          Other (Risk screening)     Test Value Reference Range Date Time    Varicella IgG        Parvovirus IgG        CMV IgG        Cystic Fibrosis        Hemoglobin electrophoresis        NIPT        MSAFP-4        AFP (for NTD only)              Legend    ^: Historical                      External Prenatal Results     Pregnancy Outside Results - Transcribed From Office Records - See Scanned Records For Details     Test Value Date Time    Hgb  12.3 g/dL 03/20/21 0533       12.0 g/dL 12/31/20 1012       13.0 g/dL 09/05/20 1224       12.7 g/dL 08/11/20 1256    Hct  37.1 % 03/20/21 0533       35.8 % 12/31/20 1012       38.0 % 09/05/20 1224       37.3 % 08/11/20 1256    ABO  O  03/20/21 0533    Rh  Positive  03/20/21 0533     Antibody Screen  Negative  21 0533       Negative  20 1012       Negative  20 1256    Glucose Fasting GTT       Glucose Tolerance Test 1 hour ^ 83  20     Glucose Tolerance Test 3 hour       Gonorrhea (discrete) ^ Negative  20     Chlamydia (discrete) ^ Negative  20     RPR  Non-Reactive  20 1256    VDRL       Syphilis Antibody       Rubella  Negative  20 1256    HBsAg  Non-Reactive  20 1256    Herpes Simplex Virus PCR       Herpes Simplex VIrus Culture       HIV  Non-Reactive  20 1256    Hep C RNA Quant PCR       Hep C Antibody  Non-Reactive  20 1256    AFP       Group B Strep ^ Negative  21     GBS Susceptibility to Clindamycin       GBS Susceptibility to Erythromycin       Fetal Fibronectin       Genetic Testing, Maternal Blood             Drug Screening     Test Value Date Time    Urine Drug Screen       Amphetamine Screen  Negative  20 1256    Barbiturate Screen  Negative  20 1256    Benzodiazepine Screen  Negative  20 1256    Methadone Screen  Negative  20 1256    Phencyclidine Screen  Negative  20 1256    Opiates Screen  Negative  20 1256    THC Screen  Positive  20 1256    Cocaine Screen       Propoxyphene Screen  Negative  20 1256    Buprenorphine Screen  Negative  20 1256    Methamphetamine Screen       Oxycodone Screen  Negative  20 1256    Tricyclic Antidepressants Screen  Negative  20 1256          Legend    ^: Historical                         Past OB History:     OB History    Para Term  AB Living   2 1 1 0 0 1   SAB TAB Ectopic Molar Multiple Live Births   0 0 0 0 0 1      # Outcome Date GA Lbr Dorian/2nd Weight Sex Delivery Anes PTL Lv   2 Current            1 Term 10/16/15   3289 g (7 lb 4 oz) M Vag-Spont EPI  ERAN      Name: Gianluca       Past Medical History: Past Medical History:   Diagnosis Date   • Chlamydia     a year ago, treated   • Gonorrhea      a year ago, treated      Past Surgical History Past Surgical History:   Procedure Laterality Date   • WISDOM TOOTH EXTRACTION        Family History: Family History   Problem Relation Age of Onset   • Diabetes Maternal Grandfather       Social History:  reports that she has never smoked. She has never used smokeless tobacco.   reports previous alcohol use.   reports current drug use. Drug: Marijuana.        Review of Systems   Constitutional: Negative.    HENT: Negative.    Eyes: Negative.    Respiratory: Negative.    Cardiovascular: Negative.    Gastrointestinal: Negative.    Endocrine: Negative.    Genitourinary: Negative.    Musculoskeletal: Negative.    Skin: Negative.    Allergic/Immunologic: Negative.    Neurological: Negative.    Hematological: Negative.    Psychiatric/Behavioral: Negative.          Objective     Vital Signs Range for the last 24 hours  Temperature: Temp:  [97.8 °F (36.6 °C)-98.2 °F (36.8 °C)] 97.8 °F (36.6 °C)   Temp Source: Temp src: Axillary   BP: BP: ()/(52-73) 101/59   Pulse: Heart Rate:  [] 87   Respirations: Resp:  [16-18] 16   SPO2:     O2 Amount (l/min):     O2 Devices     Weight: Weight:  [80.3 kg (177 lb)] 80.3 kg (177 lb)     Physical Examination: General appearance - alert, well appearing, and in no distress  Neck - supple, no significant adenopathy  Chest - clear to auscultation, no wheezes, rales or rhonchi, symmetric air entry  Heart - normal rate, regular rhythm, normal S1, S2, no murmurs, rubs, clicks or gallops  Abdomen - soft, nontender, nondistended, no masses or organomegaly  Extremities - peripheral pulses normal, no pedal edema, no clubbing or cyanosis    Presentation: vertex   Cervix: Exam by:   rn    Dilation: Cervical Dilation (cm): 5-6   Effacement: Cervical Effacement: 50-60%   Station: Fetal Station: 0-->-3     Fetal Heart Rate Assessment   Method: Fetal HR Assessment Method: external   Beats/min: Fetal HR (beats/min): 125   Baseline: Fetal Heart  Baseline Rate: normal range   Variability: Fetal HR Variability: moderate (amplitude range 6 to 25 bpm)   Accels: Fetal HR Accelerations: greater than/equal to 15 bpm, lasting at least 15 seconds   Decels: Fetal HR Decelerations: absent   Tracing Category:       Uterine Assessment   Method: Method: external tocotransducer   Frequency (min): Contraction Frequency (Minutes): 4   Ctx Count in 10 min: Contractions in 10 Minutes: 1   Duration:     Intensity: Contraction Intensity: moderate by palpation   Intensity by IUPC:     Resting Tone: Uterine Resting Tone: soft by palpation   Resting Tone by IUPC:     Rachael Units:           Assessment/Plan       Normal labor      Assessment & Plan    Assessment:  1.  Intrauterine pregnancy at 39w0d gestation with reactive fetal status.    2.  labor  without ROM  3.  Obstetrical history significant for is non-contributory.  4.  GBS status:   External Strep Group B Ag   Date Value Ref Range Status   02/25/2021 Negative  Final       Plan:  1. fetal and uterine monitoring  continuously, expectant management and analgesia with  epidural  2. Plan of care has been reviewed with patient and she verbalizes understanding  3.  Risks, benefits of treatment plan have been discussed.  4.  All questions have been answered.        Queenie Huynh CNM  3/20/2021  11:21 EDT    Electronically signed by Queenie Huynh CNM at 03/20/21 1123         Facility-Administered Medications as of 3/22/2021   Medication Dose Route Frequency Provider Last Rate Last Admin   • benzocaine (AMERICAINE) 20 % rectal ointment 1 application  1 application Rectal PRN Queenie Huynh CNM       • benzocaine-menthol (DERMOPLAST) 20-0.5 % topical spray   Topical PRN Queenie Huynh CNM   Given at 03/20/21 1728   • bisacodyl (DULCOLAX) suppository 10 mg  10 mg Rectal Daily PRN Queenie Huynh CNM       • docusate sodium (COLACE) capsule 100 mg  100 mg Oral BID Queenie Huynh CNM    100 mg at 03/21/21 2105   • Hydrocortisone (Perianal) (ANUSOL-HC) 2.5 % rectal cream 1 application  1 application Rectal PRN Queenie Huynh CNM   1 application at 03/20/21 1728   • ibuprofen (ADVIL,MOTRIN) tablet 600 mg  600 mg Oral Q6H PRN Queenie Huynh CNM   600 mg at 03/22/21 0602   • [COMPLETED] lactated ringers bolus 1,000 mL  1,000 mL Intravenous Once PRN Flaquito Roberson MD 4,000 mL/hr at 03/20/21 0824 1,000 mL at 03/20/21 0824   • lanolin cream   Topical Q1H PRN Queenie Huynh CNM   Given at 03/20/21 1728   • magnesium hydroxide (MILK OF MAGNESIA) suspension 2400 mg/10mL 10 mL  10 mL Oral Daily PRN Queenie Huynh CNM       • ondansetron (ZOFRAN) injection 4 mg  4 mg Intravenous Q6H PRN Queenie Huynh CNM       • witch hazel-glycerin (TUCKS) pad 1 pad  1 each Topical PRN Queenie Huynh CNM   1 pad at 03/20/21 1728     Lab Results (last 72 hours)     Procedure Component Value Units Date/Time    CBC & Differential [[id number]]  (Abnormal) Collected: 03/21/21 0906    Specimen: Blood Updated: 03/21/21 0951    Narrative:      The following orders were created for panel order CBC & Differential.  Procedure                               Abnormality         Status                     ---------                               -----------         ------                     CBC Auto Differential[[id number]]        Abnormal            Final result                 Please view results for these tests on the individual orders.    CBC Auto Differential [[id number]]  (Abnormal) Collected: 03/21/21 0906    Specimen: Blood Updated: 03/21/21 0951     WBC 13.98 10*3/mm3      RBC 4.00 10*6/mm3      Hemoglobin 11.7 g/dL      Hematocrit 36.4 %      MCV 91.0 fL      MCH 29.3 pg      MCHC 32.1 g/dL      RDW 13.3 %      RDW-SD 44.2 fl      MPV 10.0 fL      Platelets 216 10*3/mm3      Neutrophil % 70.2 %      Lymphocyte % 20.1 %      Monocyte % 6.4 %      Eosinophil % 2.4 %      Basophil % 0.3  %      Immature Grans % 0.6 %      Neutrophils, Absolute 9.83 10*3/mm3      Lymphocytes, Absolute 2.81 10*3/mm3      Monocytes, Absolute 0.89 10*3/mm3      Eosinophils, Absolute 0.33 10*3/mm3      Basophils, Absolute 0.04 10*3/mm3      Immature Grans, Absolute 0.08 10*3/mm3      nRBC 0.0 /100 WBC     COVID PRE-OP / PRE-PROCEDURE SCREENING ORDER (NO ISOLATION) - Swab, Nasopharynx [[id number]]  (Normal) Collected: 03/20/21 0507    Specimen: Swab from Nasopharynx Updated: 03/20/21 0747    Narrative:      The following orders were created for panel order COVID PRE-OP / PRE-PROCEDURE SCREENING ORDER (NO ISOLATION) - Swab, Nasopharynx.  Procedure                               Abnormality         Status                     ---------                               -----------         ------                     COVID-19, ABBOTT IN-HOUS...[[id number]]  Normal              Final result                 Please view results for these tests on the individual orders.    COVID-19, ABBOTT IN-HOUSE,NASAL Swab (NO TRANSPORT MEDIA) 2 HR TAT - Swab, Nasopharynx [[id number]]  (Normal) Collected: 03/20/21 0507    Specimen: Swab from Nasopharynx Updated: 03/20/21 0747     COVID19 Presumptive Negative    Narrative:      Fact sheet for providers: https://www.fda.gov/media/724430/download     Fact sheet for patients: https://www.fda.gov/media/396167/download    Test performed by PCR.  If inconsistent with clinical signs and symptoms patient should be tested with different authorized molecular test.    Preeclampsia Panel [[id number]]  (Abnormal) Collected: 03/20/21 0533    Specimen: Blood Updated: 03/20/21 0621     Alkaline Phosphatase 248 U/L      ALT (SGPT) 21 U/L      AST (SGOT) 27 U/L      Creatinine 0.61 mg/dL      Total Bilirubin 0.2 mg/dL       U/L      Uric Acid 3.7 mg/dL     GTT 1 Hour [[id number]] Resulted: 12/31/20    Specimen: Blood Updated: 03/20/21 0612     External GTT 1 Hour 83    Chlamydia trachomatis, Neisseria gonorrhoeae,  PCR w/ confirmation - Swab, Vagina [[id number]] Resulted: 08/13/20    Specimen: Swab from Vagina Updated: 03/20/21 0609     External Chlamydia Screen Negative    Gonorrhea Screen - Swab, [[id number]] Resulted: 08/13/20    Specimen: Swab Updated: 03/20/21 0609     External Gonorrhea Screen Negative    CBC (No Diff) [[id number]]  (Abnormal) Collected: 03/20/21 0533    Specimen: Blood Updated: 03/20/21 0554     WBC 15.92 10*3/mm3      RBC 4.16 10*6/mm3      Hemoglobin 12.3 g/dL      Hematocrit 37.1 %      MCV 89.2 fL      MCH 29.6 pg      MCHC 33.2 g/dL      RDW 13.4 %      RDW-SD 43.8 fl      MPV 10.1 fL      Platelets 237 10*3/mm3     Group B Streptococcus Culture - Swab, Vaginal/Rectum [[id number]] Resulted: 02/25/21    Specimen: Swab from Vaginal/Rectum Updated: 03/20/21 0205     External Strep Group B Ag Negative          Orders (last 72 hrs)      Start     Ordered    03/21/21 0800  Sitz Bath  3 Times Daily     Comments: PRN    03/20/21 1650    03/21/21 0600  CBC & Differential  Timed     Comments: Postpartum Day 1      03/20/21 1650    03/21/21 0600  CBC Auto Differential  PROCEDURE ONCE     Comments: Postpartum Day 1      03/21/21 0006    03/21/21 0000  bisacodyl (DULCOLAX) suppository 10 mg  Daily PRN      03/20/21 1650    03/20/21 2100  docusate sodium (COLACE) capsule 100 mg  2 Times Daily      03/20/21 1650    03/20/21 1651  Transfer Patient  Once      03/20/21 1650    03/20/21 1651  Code Status and Medical Interventions:  Continuous      03/20/21 1650    03/20/21 1651  Vital Signs Per hospital policy  Per Hospital Policy      03/20/21 1650    03/20/21 1651  Notify Physician  Until Discontinued      03/20/21 1650    03/20/21 1651  Up Ad Angela  Until Discontinued      03/20/21 1650    03/20/21 1651  Ambulate Patient  Every Shift      03/20/21 1650    03/20/21 1651  Diet Regular  Diet Effective Now      03/20/21 1650    03/20/21 1651  Advance Diet as Tolerated  Until Discontinued,   Status:  Canceled      03/20/21  1650 03/20/21 1651  Fundal and Lochia Check  Per Hospital Policy     Comments: Q 15 min x 4, Q 30 min x 2, then Q Shift    03/20/21 1650 03/20/21 1651  RN to Assess Rh Status & Place RhIG Evaluation Order if Indicated  Continuous,   Status:  Canceled      03/20/21 1650 03/20/21 1651  Bladder Assessment  Per Order Details     Comments: Postpartum 1) Upon Admission to Unit & Every 4 Hours PRN Until Voiding. 2) Out of Bed to Void in 8 Hours.    03/20/21 1650 03/20/21 1651  Straight Cath  Per Order Details     Comments: Postpartum: If Distended & Unable to Void, May Repeat Once.    03/20/21 1650 03/20/21 1651  Indwelling Urinary Catheter  Per Order Details     Comments: Postpartum : After Straight Cathed x2 or if Greater Than 1000mL Residual, Insert Indwelling Urinary Catheter Until Further MD Order.    03/20/21 1650 03/20/21 1651  Remove Vaginal Packing  Once      03/20/21 1650 03/20/21 1651  Breast pump to bed  Once      03/20/21 1650 03/20/21 1651  If indicated -- Please administer RH Immunoglobulin based on results of cord blood evaluation and fetal screen lab tests, pharmacy to dispense  Per Order Details,   Status:  Canceled     Comments: See Process Instructions For Reference Range Details.    03/20/21 1650 03/20/21 1651  VTE Prophylaxis Not Indicated: No Risk Factors (0); </= 3 (Low Risk)  Once      03/20/21 1650 03/20/21 1650  sodium chloride 0.9 % flush 1-10 mL  As Needed,   Status:  Discontinued      03/20/21 1650 03/20/21 1650  ibuprofen (ADVIL,MOTRIN) tablet 600 mg  Every 6 Hours PRN      03/20/21 1650 03/20/21 1650  magnesium hydroxide (MILK OF MAGNESIA) suspension 2400 mg/10mL 10 mL  Daily PRN      03/20/21 1650 03/20/21 1650  lanolin cream  Every 1 Hour PRN      03/20/21 1650 03/20/21 1650  benzocaine-menthol (DERMOPLAST) 20-0.5 % topical spray  As Needed      03/20/21 1650 03/20/21 1650  witch hazel-glycerin (TUCKS) pad 1 pad  As Needed      03/20/21 8647     03/20/21 1650  Hydrocortisone (Perianal) (ANUSOL-HC) 2.5 % rectal cream 1 application  As Needed      03/20/21 1650    03/20/21 1650  benzocaine (AMERICAINE) 20 % rectal ointment 1 application  As Needed      03/20/21 1650    03/20/21 1650  ondansetron (ZOFRAN) injection 4 mg  Every 6 Hours PRN      03/20/21 1650    03/20/21 1530  oxytocin in sodium chloride (PITOCIN) 30 UNIT/500ML infusion solution  Once,   Status:  Discontinued      03/20/21 1444    03/20/21 1508  DIET MESSAGE Please send chicken tenders, fries, and barbecue sauce with sweet tea to drink  Once,   Status:  Canceled     Comments: Please send chicken tenders, fries, and barbecue sauce with sweet tea to drink    03/20/21 1508    03/20/21 1445  Notify Provider (Specified)  Until Discontinued,   Status:  Canceled      03/20/21 1444    03/20/21 1445  Vital Signs Per Hospital Policy  Per Hospital Policy,   Status:  Canceled      03/20/21 1444    03/20/21 1445  Fundal & Lochia Check  Per Order Details,   Status:  Canceled     Comments: Every 15 Minutes x4, Then Every 30 Minutes x2, Then Every Shift    03/20/21 1444    03/20/21 1445  Fundal & Lochia Check  Every Shift,   Status:  Canceled      03/20/21 1444    03/20/21 1445  Diet Regular  Diet Effective Now,   Status:  Canceled      03/20/21 1444    03/20/21 1445  Nurse May Remove Epidural Catheter After Delivery  Continuous,   Status:  Canceled      03/20/21 1444    03/20/21 1445  Transfer to postpartum when discharge criteria met.  Until Discontinued,   Status:  Canceled      03/20/21 1444    03/20/21 1444  oxytocin in sodium chloride (PITOCIN) 30 UNIT/500ML infusion solution  Once,   Status:  Discontinued      03/20/21 1444    03/20/21 1444  methylergonovine (METHERGINE) injection 200 mcg  Once As Needed,   Status:  Discontinued      03/20/21 1444    03/20/21 1444  carboprost (HEMABATE) injection 250 mcg  As Needed,   Status:  Discontinued      03/20/21 1444    03/20/21 1444  miSOPROStol (CYTOTEC) tablet  800 mcg  As Needed,   Status:  Discontinued      03/20/21 1444    03/20/21 0930  ropivacaine (NAROPIN) 0.2 % injection  Continuous,   Status:  Discontinued      03/20/21 0836    03/20/21 0930  Sod Citrate-Citric Acid (BICITRA) solution 30 mL  Once,   Status:  Discontinued      03/20/21 0836    03/20/21 0900  sodium chloride 0.9 % flush 3 mL  Every 12 Hours Scheduled,   Status:  Discontinued      03/20/21 0458    03/20/21 0837  Vital Signs Per Anesthesia Guidelines  Per Hospital Policy,   Status:  Canceled     Comments: Every 3 Minutes x 20 Minutes following epidural dosing, then if stable every 15 Minutes    03/20/21 0836    03/20/21 0837  Start IV (16 or 18 Gauge)  Once      03/20/21 0836    03/20/21 0837  Fetal Heart Rate Monitor  Once      03/20/21 0836    03/20/21 0837  Nurse or Anesthesiologist to Remain With Patient for 15 Minutes Following Dosing  Continuous,   Status:  Canceled      03/20/21 0836    03/20/21 0837  Facilitate Maternal Position on Side & Maintain Uterine Displacement  Continuous,   Status:  Canceled      03/20/21 0836    03/20/21 0837  Consult Anesthesia Prior to Changing Epidural Infusion / Rate  Continuous,   Status:  Canceled      03/20/21 0836    03/20/21 0836  metoclopramide (REGLAN) injection 10 mg  Once As Needed,   Status:  Discontinued      03/20/21 0836    03/20/21 0836  ondansetron (ZOFRAN) injection 4 mg  Once As Needed,   Status:  Discontinued      03/20/21 0836    03/20/21 0836  famotidine (PEPCID) injection 20 mg  Once As Needed,   Status:  Discontinued      03/20/21 0836    03/20/21 0836  lactated ringers bolus 1,000 mL  As Needed,   Status:  Discontinued      03/20/21 0836    03/20/21 0836  ePHEDrine Sulfate 5 MG/ML injection 10 mg  Every 10 Minutes PRN,   Status:  Discontinued      03/20/21 0836    03/20/21 0836  diphenhydrAMINE (BENADRYL) injection 12.5 mg  Every 8 Hours PRN,   Status:  Discontinued      03/20/21 0836    03/20/21 0545  lactated ringers infusion  Continuous,    Status:  Discontinued      03/20/21 0458    03/20/21 0545  mineral oil liquid 30 mL  Once,   Status:  Discontinued      03/20/21 0458 03/20/21 0459  COVID PRE-OP / PRE-PROCEDURE SCREENING ORDER (NO ISOLATION) - Swab, Nasopharynx  Once      03/20/21 0458    03/20/21 0459  COVID-Yves ABBOTT IN-HOUSE,NASAL Swab (NO TRANSPORT MEDIA) 2 HR TAT - Swab, Nasopharynx  PROCEDURE ONCE      03/20/21 0458    03/20/21 0458  ondansetron (ZOFRAN) tablet 4 mg  Every 6 Hours PRN,   Status:  Discontinued      03/20/21 0458    03/20/21 0458  ondansetron (ZOFRAN) injection 4 mg  Every 6 Hours PRN,   Status:  Discontinued      03/20/21 0458 03/20/21 0458  butorphanol (STADOL) injection 2 mg  Every 2 Hours PRN,   Status:  Discontinued      03/20/21 0458 03/20/21 0458  butorphanol (STADOL) injection 1 mg  Every 2 Hours PRN,   Status:  Discontinued      03/20/21 0458 03/20/21 0458  acetaminophen (TYLENOL) tablet 650 mg  Every 4 Hours PRN,   Status:  Discontinued      03/20/21 0458 03/20/21 0458  VTE Prophylaxis Not Indicated: Obesity-BMI >30 (1); </= 3 (Low Risk)  Once      03/20/21 0458    03/20/21 0458  Diet Clear Liquid  Diet Effective Now,   Status:  Canceled      03/20/21 0458 03/20/21 0458  Preeclampsia Panel  Once      03/20/21 0458    03/20/21 0457  Admit To Obstetrics Inpatient  Once      03/20/21 0458    03/20/21 0457  Code Status and Medical Interventions:  Continuous,   Status:  Canceled      03/20/21 0458    03/20/21 0457  Obtain Informed Consent  Once      03/20/21 0458    03/20/21 0457  Vital Signs Per Hospital Policy  Per Hospital Policy,   Status:  Canceled      03/20/21 0458 03/20/21 0457  Mini-Prep Prior to Delivery  Once      03/20/21 0458    03/20/21 0457  Continuous Fetal Monitoring With NST on Admission and Prior to Initiation of Oxytocin.  Per Order Details,   Status:  Canceled     Comments: Continuous Fetal Monitoring With NST on Admission & Prior to Initiation of Oxytocin.    03/20/21 2418     03/20/21 0457  External Uterine Contraction Monitoring  Per Hospital Policy,   Status:  Canceled      03/20/21 0458    03/20/21 0457  Notify Provider (Specified)  Until Discontinued,   Status:  Canceled      03/20/21 0458    03/20/21 0457  Notify Provider of Tachysystole (Per Hospital Algorithm)  Until Discontinued,   Status:  Canceled      03/20/21 0458    03/20/21 0457  Notify Provider if Membranes Ruptured, Bleeding Greater Than 1 Pad Per Hour, Fetal Heart Tone Abnormality or Severe Pain  Until Discontinued,   Status:  Canceled      03/20/21 0458    03/20/21 0457  CBC (No Diff)  Once      03/20/21 0458    03/20/21 0457  Type & Screen  Once      03/20/21 0458    03/20/21 0457  Insert Peripheral IV  Once      03/20/21 0458    03/20/21 0457  Saline Lock & Maintain IV Access  Continuous,   Status:  Canceled      03/20/21 0458    03/20/21 0456  Position Change - For Intra-Uterine Resusitation for Hypertonus, HyperStimulation or Non-Reassuring Fetal Status  As Needed,   Status:  Canceled      03/20/21 0458    03/20/21 0456  lidocaine PF 1% (XYLOCAINE) injection 5 mL  As Needed,   Status:  Discontinued      03/20/21 0458    03/20/21 0456  sodium chloride 0.9 % flush 10 mL  As Needed,   Status:  Discontinued      03/20/21 0458    03/20/21 0456  lactated ringers bolus 1,000 mL  Once As Needed      03/20/21 0458    03/20/21 0000  Group B Streptococcus Culture - Swab, Vaginal/Rectum     Comments: This is an external result entered through the Results Console.      03/20/21 0205    03/20/21 0000  Chlamydia trachomatis, Neisseria gonorrhoeae, PCR w/ confirmation - Swab, Vagina     Comments: This is an external result entered through the Results Console.      03/20/21 0609    03/20/21 0000  Gonorrhea Screen - Swab,     Comments: This is an external result entered through the Results Console.      03/20/21 0609    03/20/21 0000  GTT 1 Hour     Comments: This is an external result entered through the Results Console.       03/20/21 0612    Unscheduled  Apply Ice to Perineum  As Needed     Comments: For 20 min q 2 hrs    03/20/21 1650    Unscheduled  Waffle Cushion  As Needed     Comments: For perineal discomfort    03/20/21 1650    Unscheduled  Donut Ring  As Needed     Comments: For perineal pain    03/20/21 1650    Unscheduled  Kpad  As Needed     Comments: For pain    03/20/21 1650    Unscheduled  Warm compress  As Needed      03/20/21 1650    Unscheduled  Apply ace wrap, tight bra, or binder  As Needed      03/20/21 1650    Unscheduled  Apply ice packs  As Needed      03/20/21 1650                   Operative/Procedure Notes (last 72 hours) (Notes from 03/19/21 0654 through 03/22/21 0654)      Queenie Huynh CNM at 03/20/21 1448          [facility]  Vaginal Delivery Note    Delivery     Delivery: Vaginal, Spontaneous  Z3A.39   YOB: 2021    Time of Birth: 2:26 PM      Anesthesia: Epidural     Delivering clinician: Queenie Huynh    Forceps?   No   Vacuum? No    Shoulder dystocia present: No        Delivery narrative:  Notified by RN at 1420 that patient was 10/100/+1 station. The patient was prepped for delivery and positioned in stirrups by the RN.  Without maternal pushing effort, the infant crowned and delivered over an intact perineum in one contraction with RN in attendance.   Spontaneous cry was noted.  Infant placed on maternal abdomen and dried. I arrived at bedside at this time and scrubbed in.  The cord was doubly clamped and cut.  The placenta delivered spontaneously and visually intact.  The perineum and vagina were inspected for lacerations and none found. All counts were correct.  Mom and baby entered recovery in stable condition.     Infant    Findings: male  infant, unnamed     Infant observations: Weight: No birth weight on file.   Length:   in  Observations/Comments:        Apgars: 9  @ 1 minute /    9  @ 5 minutes         Placenta, Cord, and Fluid    Placenta delivered  Spontaneous   at  3/20/2021  2:33 PM     Cord: 3 vessels  present.   Nuchal Cord?  no   Cord blood obtained: Yes    Cord gases obtained:    No     Repair    Episiotomy: No   Lacerations: No   Estimated Blood Loss: 150  mls.   Suture used for repair: NA         Complications  none    Disposition  Mother to Mother Baby/Postpartum  in stable condition currently.  Baby to remains with mom  in stable condition currently.      Queenie Huynh CNM  03/20/21  14:48 EDT        Electronically signed by Queenie Huynh CNM at 03/20/21 5072       Physician Progress Notes (last 72 hours) (Notes from 03/19/21 0654 through 03/22/21 0654)    No notes of this type exist for this encounter.

## 2021-03-22 NOTE — PROGRESS NOTES
[facility]  Vaginal Delivery Progress Note    Subjective     Doing well, pain controlled, lochia less than menses      Objective     Vital Signs Range for the last 24 hours  Temperature: Temp:  [98 °F (36.7 °C)-99.1 °F (37.3 °C)] 98.3 °F (36.8 °C)   Temp Source: Temp src: Oral   BP: BP: (102-130)/(54-66) 106/56   Pulse: Heart Rate:  [62-79] 62   Respirations: Resp:  [16] 16   SPO2:     O2 Amount (l/min):     O2 Devices           Physical Exam:  General:  no acute distresss.  Abdomen: Soft, non-tender, fundus firm  Extremities: normal, atraumatic, no cyanosis, and trace edema.       Lab results reviewed:  Yes    Lab Results   Component Value Date    WBC 13.98 (H) 2021    HGB 11.7 (L) 2021    HCT 36.4 2021    MCV 91.0 2021     2021         Assessment/Plan       Normal labor     (normal spontaneous vaginal delivery)      Alaina Kennedy is Day 2  post-partum       Plan:  Discharge home with standard precautions and return to clinic in 4-6 weeks.      Danay Lares CNM  3/22/2021  08:53 EDT

## 2021-03-23 NOTE — PAYOR COMM NOTE
"Jose Elias Klein (20 y.o. Female)     Auth#[id number]    Only one day approved.  Needs 1 additional day.    Discharged 3/22/21 with Baby.    Vaginal delivery 3/20/21.    From:Joslyn Grijalva  #[phone]  Fax#[phone]      Date of Birth Social Security Number Address Home Phone MRN    2000  [id number] [facility] [address] [facility] [address] [place], [place] [id number] [phone] [id number]    Zoroastrianism Marital Status          None Single       Admission Date Admission Type Admitting Provider Attending Provider Department, Room/Bed    3/20/21 Elective Carolina Membreno MD  [facility] MOTHER BABY 4A, N407/1    Discharge Date Discharge Disposition Discharge Destination        3/22/2021 Home or Self Care              Attending Provider: (none)   Allergies: No Known Allergies    Isolation: None   Infection: None   Code Status: Prior    Ht: 157.5 cm (62\")   Wt: 80.3 kg (177 lb)    Admission Cmt: None   Principal Problem: None                Active Insurance as of 3/20/2021     Primary Coverage     Payor Plan Insurance Group Employer/Plan Group    [employer] [place] [employer]      Payor Plan Address Payor Plan Phone Number Payor Plan Fax Number Effective Dates    [address] [address] [phone]  6/19/2020 - None Entered    [facility] [id number]       Subscriber Name Subscriber Birth Date Member ID       JOSE ELIAS KLEIN 2000 [id number]                 Emergency Contacts      (Rel.) Home Phone Work Phone Mobile Phone    BUDD,APRIL (Mother) [phone] -- --               Operative/Procedure Notes (last 7 days) (Notes from 03/16/21 0653 through 03/23/21 0653)      Queenie Huynh CNM at 03/20/21 1448          [facility]  Vaginal Delivery Note    Delivery     Delivery: Vaginal, Spontaneous  Z3A.39   YOB: 2021    Time of Birth: 2:26 PM      Anesthesia: Epidural     Delivering clinician: Queenie Huynh    Forceps?   No   Vacuum? No    Shoulder dystocia present: No   "      Delivery narrative:  Notified by RN at 1420 that patient was 10/100/+1 station. The patient was prepped for delivery and positioned in stirrups by the RN.  Without maternal pushing effort, the infant crowned and delivered over an intact perineum in one contraction with RN in attendance.   Spontaneous cry was noted.  Infant placed on maternal abdomen and dried. I arrived at bedside at this time and scrubbed in.  The cord was doubly clamped and cut.  The placenta delivered spontaneously and visually intact.  The perineum and vagina were inspected for lacerations and none found. All counts were correct.  Mom and baby entered recovery in stable condition.     Infant    Findings: male  infant, unnamed     Infant observations: Weight: No birth weight on file.   Length:   in  Observations/Comments:        Apgars: 9  @ 1 minute /    9  @ 5 minutes         Placenta, Cord, and Fluid    Placenta delivered  Spontaneous  at  3/20/2021  2:33 PM     Cord: 3 vessels  present.   Nuchal Cord?  no   Cord blood obtained: Yes    Cord gases obtained:    No     Repair    Episiotomy: No   Lacerations: No   Estimated Blood Loss: 150  mls.   Suture used for repair: NA         Complications  none    Disposition  Mother to Mother Baby/Postpartum  in stable condition currently.  Baby to remains with mom  in stable condition currently.      Queenie Huynh CNM  03/20/21  14:48 EDT        Electronically signed by Queenie Huynh CNM at 03/20/21 4156          Discharge Summary      Danay Lares CNM at 03/22/21 0859     Attestation signed by Kayleen Cabezas MD at 03/22/21 0906    I have reviewed the documentation and agree.                  [facility]  Delivery Discharge Summary    Primary OB Clinician:     EDC: Estimated Date of Delivery: 3/27/21    Gestational Age:39w0d    Date of Admission: 3/20/2021    Admission Diagnosis:      Discharge Diagnosis: Same    Date of Delivery: 3/20/2021   Time of Delivery: 2:26 PM      Delivered By:  Queenie Huynh     Delivery Type: Vaginal, Spontaneous          Baby:male  infant;   Apgar:  9  @ 1 minute /   Apgar:  9  @ 5 minutes   Weight: 2490 g (5 lb 7.8 oz)    Length: 18     Anesthesia: Epidural      Laceration: No    Exam:  Normal postpartum exam    Hospital Course:  Hospital course has been stable.  Patient is tolerating po, voiding without difficulty and ready for discharge.  Please see medication reconciliation and lab report for additional details.      Follow Up:  Patient has been given a follow up appointment in our office.     Discharge Date: 3/22/2021; Discharge Time: 09:00 EDT    Danay Lares CNM   08:59 EDT   21         Electronically signed by Kayleen Cabezas MD at 21 0906

## 2021-07-24 ENCOUNTER — OFFICE VISIT (OUTPATIENT)
Dept: FAMILY MEDICINE CLINIC | Facility: CLINIC | Age: 21
End: 2021-07-24

## 2021-07-24 VITALS
BODY MASS INDEX: 28.37 KG/M2 | HEART RATE: 92 BPM | WEIGHT: 154.2 LBS | OXYGEN SATURATION: 99 % | SYSTOLIC BLOOD PRESSURE: 106 MMHG | DIASTOLIC BLOOD PRESSURE: 70 MMHG | RESPIRATION RATE: 16 BRPM | HEIGHT: 62 IN | TEMPERATURE: 98.2 F

## 2021-07-24 DIAGNOSIS — R11.0 NAUSEA: ICD-10-CM

## 2021-07-24 DIAGNOSIS — Z20.2 STD EXPOSURE: ICD-10-CM

## 2021-07-24 DIAGNOSIS — H61.23 BILATERAL IMPACTED CERUMEN: ICD-10-CM

## 2021-07-24 DIAGNOSIS — Z00.00 ANNUAL PHYSICAL EXAM: Primary | ICD-10-CM

## 2021-07-24 PROCEDURE — 99385 PREV VISIT NEW AGE 18-39: CPT | Performed by: NURSE PRACTITIONER

## 2021-07-24 RX ORDER — METRONIDAZOLE 500 MG/1
500 TABLET ORAL 2 TIMES DAILY
Qty: 14 TABLET | Refills: 0 | Status: SHIPPED | OUTPATIENT
Start: 2021-07-24 | End: 2021-07-31

## 2021-07-24 RX ORDER — ONDANSETRON 4 MG/1
4 TABLET, ORALLY DISINTEGRATING ORAL EVERY 8 HOURS PRN
Qty: 30 TABLET | Refills: 0 | Status: SHIPPED | OUTPATIENT
Start: 2021-07-24 | End: 2021-10-20

## 2021-07-24 NOTE — ASSESSMENT & PLAN NOTE
STI panel ordered vaginal swab  Start flagyl as directed and avoid sexual activity at this time and for 7-10 days after treatment

## 2021-07-24 NOTE — ASSESSMENT & PLAN NOTE
Labs ordered including h.pylori to assess nausea and frequent bloating  Zofran PRN as directed  Instructed to eat small and more frequent meals, stay well hydrated and avoid spicy foods.

## 2021-07-24 NOTE — PATIENT INSTRUCTIONS
Health Maintenance, Female  Adopting a healthy lifestyle and getting preventive care are important in promoting health and wellness. Ask your health care provider about:  · The right schedule for you to have regular tests and exams.  · Things you can do on your own to prevent diseases and keep yourself healthy.  What should I know about diet, weight, and exercise?  Eat a healthy diet    · Eat a diet that includes plenty of vegetables, fruits, low-fat dairy products, and lean protein.  · Do not eat a lot of foods that are high in solid fats, added sugars, or sodium.  Maintain a healthy weight  Body mass index (BMI) is used to identify weight problems. It estimates body fat based on height and weight. Your health care provider can help determine your BMI and help you achieve or maintain a healthy weight.  Get regular exercise  Get regular exercise. This is one of the most important things you can do for your health. Most adults should:  · Exercise for at least 150 minutes each week. The exercise should increase your heart rate and make you sweat (moderate-intensity exercise).  · Do strengthening exercises at least twice a week. This is in addition to the moderate-intensity exercise.  · Spend less time sitting. Even light physical activity can be beneficial.  Watch cholesterol and blood lipids  Have your blood tested for lipids and cholesterol at 20 years of age, then have this test every 5 years.  Have your cholesterol levels checked more often if:  · Your lipid or cholesterol levels are high.  · You are older than 40 years of age.  · You are at high risk for heart disease.  What should I know about cancer screening?  Depending on your health history and family history, you may need to have cancer screening at various ages. This may include screening for:  · Breast cancer.  · Cervical cancer.  · Colorectal cancer.  · Skin cancer.  · Lung cancer.  What should I know about heart disease, diabetes, and high blood  pressure?  Blood pressure and heart disease  · High blood pressure causes heart disease and increases the risk of stroke. This is more likely to develop in people who have high blood pressure readings, are of  descent, or are overweight.  · Have your blood pressure checked:  ? Every 3-5 years if you are 18-39 years of age.  ? Every year if you are 40 years old or older.  Diabetes  Have regular diabetes screenings. This checks your fasting blood sugar level. Have the screening done:  · Once every three years after age 40 if you are at a normal weight and have a low risk for diabetes.  · More often and at a younger age if you are overweight or have a high risk for diabetes.  What should I know about preventing infection?  Hepatitis B  If you have a higher risk for hepatitis B, you should be screened for this virus. Talk with your health care provider to find out if you are at risk for hepatitis B infection.  Hepatitis C  Testing is recommended for:  · Everyone born from 1945 through 1965.  · Anyone with known risk factors for hepatitis C.  Sexually transmitted infections (STIs)  · Get screened for STIs, including gonorrhea and chlamydia, if:  ? You are sexually active and are younger than 24 years of age.  ? You are older than 24 years of age and your health care provider tells you that you are at risk for this type of infection.  ? Your sexual activity has changed since you were last screened, and you are at increased risk for chlamydia or gonorrhea. Ask your health care provider if you are at risk.  · Ask your health care provider about whether you are at high risk for HIV. Your health care provider may recommend a prescription medicine to help prevent HIV infection. If you choose to take medicine to prevent HIV, you should first get tested for HIV. You should then be tested every 3 months for as long as you are taking the medicine.  Pregnancy  · If you are about to stop having your period (premenopausal) and  you may become pregnant, seek counseling before you get pregnant.  · Take 400 to 800 micrograms (mcg) of folic acid every day if you become pregnant.  · Ask for birth control (contraception) if you want to prevent pregnancy.  Osteoporosis and menopause  Osteoporosis is a disease in which the bones lose minerals and strength with aging. This can result in bone fractures. If you are 65 years old or older, or if you are at risk for osteoporosis and fractures, ask your health care provider if you should:  · Be screened for bone loss.  · Take a calcium or vitamin D supplement to lower your risk of fractures.  · Be given hormone replacement therapy (HRT) to treat symptoms of menopause.  Follow these instructions at home:  Lifestyle  · Do not use any products that contain nicotine or tobacco, such as cigarettes, e-cigarettes, and chewing tobacco. If you need help quitting, ask your health care provider.  · Do not use street drugs.  · Do not share needles.  · Ask your health care provider for help if you need support or information about quitting drugs.  Alcohol use  · Do not drink alcohol if:  ? Your health care provider tells you not to drink.  ? You are pregnant, may be pregnant, or are planning to become pregnant.  · If you drink alcohol:  ? Limit how much you use to 0-1 drink a day.  ? Limit intake if you are breastfeeding.  · Be aware of how much alcohol is in your drink. In the [place] one drink equals one 12 oz bottle of beer (355 mL), one 5 oz glass of wine (148 mL), or one 1½ oz glass of hard liquor (44 mL).  General instructions  · Schedule regular health, dental, and eye exams.  · Stay current with your vaccines.  · Tell your health care provider if:  ? You often feel depressed.  ? You have ever been abused or do not feel safe at home.  Summary  · Adopting a healthy lifestyle and getting preventive care are important in promoting health and wellness.  · Follow your health care provider's instructions about healthy  diet, exercising, and getting tested or screened for diseases.  · Follow your health care provider's instructions on monitoring your cholesterol and blood pressure.  This information is not intended to replace advice given to you by your health care provider. Make sure you discuss any questions you have with your health care provider.  Document Revised: 12/11/2019 Document Reviewed: 12/11/2019  [employer] Patient Education © 2021 [employer]    Nausea, Adult  Nausea is feeling sick to your stomach or feeling that you are about to throw up (vomit). Feeling sick to your stomach is usually not serious, but it may be an early sign of a more serious medical problem.  As you feel sicker to your stomach, you may throw up. If you throw up, or if you are not able to drink enough fluids, there is a risk that you may lose too much water in your body (get dehydrated). If you lose too much water in your body, you may:  · Feel tired.  · Feel thirsty.  · Have a dry mouth.  · Have cracked lips.  · Go pee (urinate) less often.  Older adults and people who have other diseases or a weak body defense system (immune system) have a higher risk of losing too much water in the body.  The main goals of treating this condition are:  · To relieve your nausea.  · To ensure your nausea occurs less often.  · To prevent throwing up and losing too much fluid.  Follow these instructions at home:  Watch your symptoms for any changes. Tell your doctor about them. Follow these instructions as told by your doctor.  Eating and drinking         · Take an ORS (oral rehydration solution). This is a drink that is sold at pharmacies and stores.  · Drink clear fluids in small amounts as you are able. These include:  ? Water.  ? Ice chips.  ? Fruit juice that has water added (diluted fruit juice).  ? Low-calorie sports drinks.  · Eat bland, easy-to-digest foods in small amounts as you are able, such as:  ? Bananas.  ? Applesauce.  ? Rice.  ? Low-fat (lean)  meats.  ? Toast.  ? Crackers.  · Avoid drinking fluids that have a lot of sugar or caffeine in them. This includes energy drinks, sports drinks, and soda.  · Avoid alcohol.  · Avoid spicy or fatty foods.  General instructions  · Take over-the-counter and prescription medicines only as told by your doctor.  · Rest at home while you get better.  · Drink enough fluid to keep your pee (urine) pale yellow.  · Take slow and deep breaths when you feel sick to your stomach.  · Avoid food or things that have strong smells.  · Wash your hands often with soap and water. If you cannot use soap and water, use hand .  · Make sure that all people in your home wash their hands well and often.  · Keep all follow-up visits as told by your doctor. This is important.  Contact a doctor if:  · You feel sicker to your stomach.  · You feel sick to your stomach for more than 2 days.  · You throw up.  · You are not able to drink fluids without throwing up.  · You have new symptoms.  · You have a fever.  · You have a headache.  · You have muscle cramps.  · You have a rash.  · You have pain while peeing.  · You feel light-headed or dizzy.  Get help right away if:  · You have pain in your chest, neck, arm, or jaw.  · You feel very weak or you pass out (faint).  · You have throw up that is bright red or looks like coffee grounds.  · You have bloody or black poop (stools) or poop that looks like tar.  · You have a very bad headache, a stiff neck, or both.  · You have very bad pain, cramping, or bloating in your belly (abdomen).  · You have trouble breathing or you are breathing very quickly.  · Your heart is beating very quickly.  · Your skin feels cold and clammy.  · You feel confused.  · You have signs of losing too much water in your body, such as:  ? Dark pee, very little pee, or no pee.  ? Cracked lips.  ? Dry mouth.  ? Sunken eyes.  ? Sleepiness.  ? Weakness.  These symptoms may be an emergency. Do not wait to see if the symptoms  will go away. Get medical help right away. Call your local emergency services (911 in the [place] Do not drive yourself to the hospital.  Summary  · Nausea is feeling sick to your stomach or feeling that you are about to throw up (vomit).  · If you throw up, or if you are not able to drink enough fluids, there is a risk that you may lose too much water in your body (get dehydrated).  · Eat and drink what your doctor tells you. Take over-the-counter and prescription medicines only as told by your doctor.  · Contact a doctor right away if your symptoms get worse or you have new symptoms.  · Keep all follow-up visits as told by your doctor. This is important.  This information is not intended to replace advice given to you by your health care provider. Make sure you discuss any questions you have with your health care provider.  Document Revised: 11/17/2020 Document Reviewed: 05/28/2019  [employer] Patient Education © 2021 [employer]    Preventive Care 18-21 Years Old, Female  Preventive care refers to lifestyle choices and visits with your health care provider that can promote health and wellness. At this stage in your life, you may start seeing a primary care physician instead of a pediatrician. It is important to take responsibility for your health and well-being. Preventive care for young adults includes:  · A yearly physical exam. This is also called an annual wellness visit.  · Regular dental and eye exams.  · Immunizations.  · Screening for certain conditions.  · Healthy lifestyle choices, such as:  ? Eating a healthy diet.  ? Getting regular exercise.  ? Not using drugs or products that contain nicotine and tobacco.  ? Limiting alcohol use.  What can I expect for my preventive care visit?  Physical exam  Your health care provider may check your:  · Height and weight. These may be used to calculate your BMI (body mass index). BMI is a measurement that tells if you are at a healthy weight.  · Heart rate and blood  pressure.  · Body temperature.  · Skin for abnormal spots.  Counseling  Your health care provider may ask you questions about your:  · Past medical problems.  · Family's medical history.  · Alcohol, tobacco, and drug use.  · Home life and relationship well-being.  · Access to firearms.  · Emotional well-being.  · Diet, exercise, and sleep habits.  · Sexual activity and sexual health.  · Method of birth control.  · Menstrual cycle.  · Pregnancy history.  What immunizations do I need?    Vaccines are usually given at various ages, according to a schedule. Your health care provider will recommend vaccines for you based on your age, medical history, and lifestyle or other factors, such as travel or where you work.  What tests do I need?  Blood tests  · Lipid and cholesterol levels. These may be checked every 5 years starting at age 20.  · Hepatitis C test.  · Hepatitis B test.  Screening  · Pelvic exam and Pap test. This may be done every 3 years starting at age 21.  · STD (sexually transmitted disease) testing, if you are at risk.  · BRCA-related cancer screening. This may be done if you have a family history of breast, ovarian, tubal, or peritoneal cancers.  Other tests  · Tuberculosis skin test.  · Vision and hearing tests.  · Skin exam.  · Breast exam.  Talk with your health care provider about your test results, treatment options, and if necessary, the need for more tests.  Follow these instructions at home:  Eating and drinking    · Eat a healthy diet that includes fresh fruits and vegetables, whole grains, lean protein, and low-fat dairy products.  · Drink enough fluid to keep your urine pale yellow.  · Do not drink alcohol if:  ? Your health care provider tells you not to drink.  ? You are pregnant, may be pregnant, or are planning to become pregnant.  ? You are under the legal drinking age. In the [place] the legal drinking age is 21.  · If you drink alcohol:  ? Limit how much you use to 0-1 drink a day.  ? Be  aware of how much alcohol is in your drink. In the [place] one drink equals one 12 oz bottle of beer (355 mL), one 5 oz glass of wine (148 mL), or one 1½ oz glass of hard liquor (44 mL).  Lifestyle  · Take daily care of your teeth and gums. Brush your teeth every morning and night with fluoride toothpaste. Floss one time each day.  · Stay active. Exercise for at least 30 minutes 5 or more days of the week.  · Do not use any products that contain nicotine or tobacco, such as cigarettes, e-cigarettes, and chewing tobacco. If you need help quitting, ask your health care provider.  · Do not use drugs.  · If you are sexually active, practice safe sex. Use a condom or other form of birth control (contraception) in order to prevent pregnancy and STIs (sexually transmitted infections). If you plan to become pregnant, see your health care provider for a pre-conception visit.  · Find healthy ways to cope with stress, such as:  ? Meditation, yoga, or listening to music.  ? Journaling.  ? Talking to a trusted person.  ? Spending time with friends and family.  Safety  · Always wear your seat belt while driving or riding in a vehicle.  · Do not drive:  ? If you have been drinking alcohol. Do not ride with someone who has been drinking.  ? When you are tired or distracted.  ? While texting.  · Wear a helmet and other protective equipment during sports activities.  · If you have firearms in your house, make sure you follow all gun safety procedures.  · Seek help if you have been bullied, physically abused, or sexually abused.  · Use the Internet responsibly to avoid dangers, such as online bullying and online sex predators.  What's next?  · Go to your health care provider once a year for an annual wellness visit.  · Ask your health care provider how often you should have your eyes and teeth checked.  · Stay up to date on all vaccines.  This information is not intended to replace advice given to you by your health care provider. Make  sure you discuss any questions you have with your health care provider.  Document Revised: 09/02/2020 Document Reviewed: 12/12/2019  [employer] Patient Education © 2021 [employer]

## 2021-07-24 NOTE — PROGRESS NOTES
New Patient Office Visit      Patient Name: Alaina Kennedy  : 2000   MRN: [id number]   Care Team: Patient Care Team:  Mayda Roth APRN as PCP - General (Nurse Practitioner)    Chief Complaint:    Chief Complaint   Patient presents with   • Establish Care   • STD screening       Patient presents for annual physical exam and to establish care with office.  She denies ever having a Pap smear and states she has a  and a 5-year-old.  Explains she was exposed to an STI and was started on Flagyl several weeks ago for a type of BV.  States she took 1 dose and lost bottle so she did not get to finish treatment.  Admits thick and yellowish vaginal discharge. Reports balanced diet ut does not eat enough most days because she has nausea epsodes or fullness after eating small amounts.       Subjective      Review of Systems:   Review of Systems    Past Medical History:   Past Medical History:   Diagnosis Date   • Chlamydia     a year ago, treated   • Gonorrhea     a year ago, treated       Past Surgical History:   Past Surgical History:   Procedure Laterality Date   • WISDOM TOOTH EXTRACTION         Family History:   Family History   Problem Relation Age of Onset   • Diabetes Maternal Grandfather        Social History:   Social History     Socioeconomic History   • Marital status: Single     Spouse name: Not on file   • Number of children: Not on file   • Years of education: Not on file   • Highest education level: Not on file   Tobacco Use   • Smoking status: Never Smoker   • Smokeless tobacco: Never Used   Substance and Sexual Activity   • Alcohol use: Yes     Comment: socially    • Drug use: Not Currently     Types: Marijuana     Comment: last use 2020   • Sexual activity: Yes     Partners: Female, Male     Birth control/protection: I.U.D.       Tobacco History:   Social History     Tobacco Use   Smoking Status Never Smoker   Smokeless Tobacco Never Used       Medications:     Current  "Outpatient Medications:   •  metroNIDAZOLE (Flagyl) 500 MG tablet, Take 1 tablet by mouth 2 (Two) Times a Day for 7 days., Disp: 14 tablet, Rfl: 0  •  ondansetron ODT (Zofran ODT) 4 MG disintegrating tablet, Place 1 tablet on the tongue Every 8 (Eight) Hours As Needed for Nausea or Vomiting., Disp: 30 tablet, Rfl: 0    Allergies:   No Known Allergies    Objective     Physical Exam  Vital Signs:   Vitals:    07/24/21 1501   BP: 106/70   Pulse: 92   Resp: 16   Temp: 98.2 °F (36.8 °C)   TempSrc: Temporal   SpO2: 99%   Weight: 69.9 kg (154 lb 3.2 oz)   Height: 157.5 cm (62\")   PainSc: 0-No pain     Body mass index is 28.2 kg/m².     Physical Exam  Vitals and nursing note reviewed.   Constitutional:       Appearance: Normal appearance.   HENT:      Head: Normocephalic and atraumatic.      Right Ear: There is impacted cerumen.      Left Ear: There is impacted cerumen.      Nose: Nose normal.      Mouth/Throat:      Mouth: Mucous membranes are moist.      Pharynx: Oropharynx is clear.   Eyes:      Extraocular Movements: Extraocular movements intact.      Pupils: Pupils are equal, round, and reactive to light.   Cardiovascular:      Rate and Rhythm: Normal rate and regular rhythm.      Pulses: Normal pulses.      Heart sounds: Normal heart sounds. No murmur heard.     Pulmonary:      Effort: Pulmonary effort is normal.      Breath sounds: Normal breath sounds.   Abdominal:      General: Bowel sounds are normal. There is distension.      Palpations: Abdomen is soft.      Tenderness: There is no abdominal tenderness.   Musculoskeletal:         General: Normal range of motion.      Cervical back: Normal range of motion and neck supple. No tenderness.   Skin:     General: Skin is warm and dry.   Neurological:      General: No focal deficit present.      Mental Status: She is alert and oriented to person, place, and time.   Psychiatric:         Mood and Affect: Mood normal.         Behavior: Behavior normal.         Thought " Content: Thought content normal.         Judgment: Judgment normal.           Assessment / Plan      Assessment/Plan  Problems Addressed This Visit  Diagnoses and all orders for this visit:    1. Annual physical exam (Primary)  Assessment & Plan:  The patient is here for health maintenance visit.  Currently, the patient consumes a healthy diet and has an adequate exercise regimen.  Screening lab work is ordered.  Immunizations were reviewed today.  Advice and education was given regarding nutrition, aerobic exercise, routine dental evaluations, routine eye exams, reproductive health, cardiovascular risk reduction, sunscreen use, self skin examination (annual dermatology evaluations) and seatbelt use (general overall safety).  Further recommendations will be given if needed after lab evaluation.  Annual wellness evaluation is recommended.      Orders:  -     Lipid panel; Future  -     Ambulatory Referral to Gynecology  -     Hemoglobin A1c; Future  -     Vitamin B12; Future  -     Vitamin D 25 Hydroxy; Future  -     TSH Rfx On Abnormal To Free T4; Future  -     Hepatitis C Antibody; Future  -     Comprehensive Metabolic Panel; Future  -     CBC & Differential; Future    2. STD exposure  Assessment & Plan:  STI panel ordered vaginal swab  Start flagyl as directed and avoid sexual activity at this time and for 7-10 days after treatment     Orders:  -     metroNIDAZOLE (Flagyl) 500 MG tablet; Take 1 tablet by mouth 2 (Two) Times a Day for 7 days.  Dispense: 14 tablet; Refill: 0    3. Nausea  Assessment & Plan:  Labs ordered including h.pylori to assess nausea and frequent bloating  Zofran PRN as directed  Instructed to eat small and more frequent meals, stay well hydrated and avoid spicy foods.    Orders:  -     H.pylori,IgG / IgA Antibodies; Future  -     Hemoglobin A1c; Future  -     TSH Rfx On Abnormal To Free T4; Future  -     Comprehensive Metabolic Panel; Future  -     CBC & Differential; Future  -     ondansetron  ODT (Zofran ODT) 4 MG disintegrating tablet; Place 1 tablet on the tongue Every 8 (Eight) Hours As Needed for Nausea or Vomiting.  Dispense: 30 tablet; Refill: 0    4. Bilateral impacted cerumen  Assessment & Plan:  Ear irrigation performed with moderate cerumen removal bilateral ears, patient tolerated well.    Orders:  -     Ear Cerumen Removal  Ear Cerumen Removal    Date/Time: 7/24/2021 3:58 PM  Performed by: Mayda Roth APRN  Authorized by: Mayda Roth APRN   Consent: Verbal consent obtained.  Risks and benefits: risks, benefits and alternatives were discussed  Consent given by: patient  Patient understanding: patient states understanding of the procedure being performed           Plan of care reviewed with patient at the conclusion of today's visit. Education was provided regarding diagnosis and management.  Patient verbalizes understanding of and agreement with management plan.    Follow Up: Return in about 1 year (around 7/24/2022) for Annual.        SERA Santacruz  [facility] [place]

## 2021-07-26 ENCOUNTER — PATIENT ROUNDING (BHMG ONLY) (OUTPATIENT)
Dept: FAMILY MEDICINE CLINIC | Facility: CLINIC | Age: 21
End: 2021-07-26

## 2021-07-26 NOTE — PROGRESS NOTES
July 26, 2021    Hello, may I speak with Alaina Kennedy?    My name is Cristin Palacios    I am  with [facility]  [address] [facility] [address] [address] [phone].    Before we get started may I verify your date of birth? 2000    I am calling to officially welcome you to our practice and ask about your recent visit. Is this a good time to talk? yes    Tell me about your visit with us. What things went well? Everything went good       We're always looking for ways to make our patients' experiences even better. Do you have recommendations on ways we may improve?  no    Overall were you satisfied with your first visit to our practice? yes       I appreciate you taking the time to speak with me today. Is there anything else I can do for you? no      Thank you, and have a great day.

## 2021-07-28 ENCOUNTER — TELEPHONE (OUTPATIENT)
Dept: FAMILY MEDICINE CLINIC | Facility: CLINIC | Age: 21
End: 2021-07-28

## 2021-07-28 NOTE — TELEPHONE ENCOUNTER
Attempted to call patient to let her know that it takes 10-14 days to get results back. VM left to return call

## 2021-07-28 NOTE — TELEPHONE ENCOUNTER
Caller: Alaina Kennedy    Relationship: Self    Best call back number: [phone]    Caller requesting test results: YES    What test was performed: VAGINAL     When was the test performed: 07/24    Where was the test performed: IN OFFICE    Additional notes:

## 2021-08-03 ENCOUNTER — LAB (OUTPATIENT)
Dept: FAMILY MEDICINE CLINIC | Facility: CLINIC | Age: 21
End: 2021-08-03

## 2021-08-03 DIAGNOSIS — Z00.00 ANNUAL PHYSICAL EXAM: ICD-10-CM

## 2021-08-03 DIAGNOSIS — R11.0 NAUSEA: ICD-10-CM

## 2021-08-03 LAB
25(OH)D3 SERPL-MCNC: 25.1 NG/ML (ref 30–100)
ALBUMIN SERPL-MCNC: 3.9 G/DL (ref 3.5–5.2)
ALBUMIN/GLOB SERPL: 1.1 G/DL
ALP SERPL-CCNC: 102 U/L (ref 39–117)
ALT SERPL W P-5'-P-CCNC: 22 U/L (ref 1–33)
ANION GAP SERPL CALCULATED.3IONS-SCNC: 12.6 MMOL/L (ref 5–15)
AST SERPL-CCNC: 24 U/L (ref 1–32)
BASOPHILS # BLD AUTO: 0.04 10*3/MM3 (ref 0–0.2)
BASOPHILS NFR BLD AUTO: 0.4 % (ref 0–1.5)
BILIRUB SERPL-MCNC: 0.2 MG/DL (ref 0–1.2)
BUN SERPL-MCNC: 16 MG/DL (ref 6–20)
BUN/CREAT SERPL: 16.2 (ref 7–25)
CALCIUM SPEC-SCNC: 9.4 MG/DL (ref 8.6–10.5)
CHLORIDE SERPL-SCNC: 106 MMOL/L (ref 98–107)
CO2 SERPL-SCNC: 20.4 MMOL/L (ref 22–29)
CREAT SERPL-MCNC: 0.99 MG/DL (ref 0.57–1)
DEPRECATED RDW RBC AUTO: 38.8 FL (ref 37–54)
EOSINOPHIL # BLD AUTO: 0.3 10*3/MM3 (ref 0–0.4)
EOSINOPHIL NFR BLD AUTO: 3.3 % (ref 0.3–6.2)
ERYTHROCYTE [DISTWIDTH] IN BLOOD BY AUTOMATED COUNT: 12 % (ref 12.3–15.4)
GFR SERPL CREATININE-BSD FRML MDRD: 87 ML/MIN/1.73
GLOBULIN UR ELPH-MCNC: 3.6 GM/DL
GLUCOSE SERPL-MCNC: 77 MG/DL (ref 65–99)
HCT VFR BLD AUTO: 40.1 % (ref 34–46.6)
HCV AB SER DONR QL: NORMAL
HGB BLD-MCNC: 13.5 G/DL (ref 12–15.9)
IMM GRANULOCYTES # BLD AUTO: 0.02 10*3/MM3 (ref 0–0.05)
IMM GRANULOCYTES NFR BLD AUTO: 0.2 % (ref 0–0.5)
LYMPHOCYTES # BLD AUTO: 2.8 10*3/MM3 (ref 0.7–3.1)
LYMPHOCYTES NFR BLD AUTO: 31 % (ref 19.6–45.3)
MCH RBC QN AUTO: 29.7 PG (ref 26.6–33)
MCHC RBC AUTO-ENTMCNC: 33.7 G/DL (ref 31.5–35.7)
MCV RBC AUTO: 88.3 FL (ref 79–97)
MONOCYTES # BLD AUTO: 0.56 10*3/MM3 (ref 0.1–0.9)
MONOCYTES NFR BLD AUTO: 6.2 % (ref 5–12)
NEUTROPHILS NFR BLD AUTO: 5.31 10*3/MM3 (ref 1.7–7)
NEUTROPHILS NFR BLD AUTO: 58.9 % (ref 42.7–76)
NRBC BLD AUTO-RTO: 0 /100 WBC (ref 0–0.2)
PLATELET # BLD AUTO: 298 10*3/MM3 (ref 140–450)
PMV BLD AUTO: 10.7 FL (ref 6–12)
POTASSIUM SERPL-SCNC: 4.9 MMOL/L (ref 3.5–5.2)
PROT SERPL-MCNC: 7.5 G/DL (ref 6–8.5)
RBC # BLD AUTO: 4.54 10*6/MM3 (ref 3.77–5.28)
SODIUM SERPL-SCNC: 139 MMOL/L (ref 136–145)
TSH SERPL DL<=0.05 MIU/L-ACNC: 1.85 UIU/ML (ref 0.27–4.2)
VIT B12 BLD-MCNC: 421 PG/ML (ref 211–946)
WBC # BLD AUTO: 9.03 10*3/MM3 (ref 3.4–10.8)

## 2021-08-03 PROCEDURE — 82306 VITAMIN D 25 HYDROXY: CPT | Performed by: NURSE PRACTITIONER

## 2021-08-03 PROCEDURE — 86677 HELICOBACTER PYLORI ANTIBODY: CPT | Performed by: NURSE PRACTITIONER

## 2021-08-03 PROCEDURE — 86803 HEPATITIS C AB TEST: CPT | Performed by: NURSE PRACTITIONER

## 2021-08-03 PROCEDURE — 80050 GENERAL HEALTH PANEL: CPT | Performed by: NURSE PRACTITIONER

## 2021-08-03 PROCEDURE — 82607 VITAMIN B-12: CPT | Performed by: NURSE PRACTITIONER

## 2021-08-04 ENCOUNTER — TELEPHONE (OUTPATIENT)
Dept: FAMILY MEDICINE CLINIC | Facility: CLINIC | Age: 21
End: 2021-08-04

## 2021-08-04 LAB
H PYLORI IGA SER-ACNC: 11.2 UNITS (ref 0–8.9)
H PYLORI IGG SER IA-ACNC: 0.2 INDEX VALUE (ref 0–0.79)

## 2021-08-04 NOTE — TELEPHONE ENCOUNTER
Caller: Alaina Kennedy    Relationship: Self    Best call back number: [phone]    Caller requesting test results: SELF    What test was performed: LAB WORK AND VAGINAL SWABS    Additional notes: PATIENT CALLED CHECKING ON THE STATUS OF TEST RESULTS. PLEASE ADVISE AND CALL PATIENT BACK

## 2021-08-06 DIAGNOSIS — E55.9 VITAMIN D INSUFFICIENCY: ICD-10-CM

## 2021-08-06 DIAGNOSIS — A04.8 BACTERIAL INFECTION DUE TO H. PYLORI: Primary | ICD-10-CM

## 2021-08-06 DIAGNOSIS — B37.31 VAGINAL YEAST INFECTION: Primary | ICD-10-CM

## 2021-08-06 RX ORDER — ERGOCALCIFEROL 1.25 MG/1
50000 CAPSULE ORAL WEEKLY
Qty: 12 CAPSULE | Refills: 1 | Status: SHIPPED | OUTPATIENT
Start: 2021-08-06 | End: 2021-09-01

## 2021-08-06 RX ORDER — FLUCONAZOLE 150 MG/1
TABLET ORAL
Qty: 2 TABLET | Refills: 0 | Status: SHIPPED | OUTPATIENT
Start: 2021-08-06 | End: 2021-09-13 | Stop reason: SDUPTHER

## 2021-08-06 RX ORDER — AMOXICILLIN 500 MG/1
1000 CAPSULE ORAL 2 TIMES DAILY
Qty: 56 CAPSULE | Refills: 0 | Status: SHIPPED | OUTPATIENT
Start: 2021-08-06 | End: 2021-08-20

## 2021-08-06 RX ORDER — CLARITHROMYCIN 500 MG/1
500 TABLET, COATED ORAL 2 TIMES DAILY
Qty: 28 TABLET | Refills: 0 | Status: SHIPPED | OUTPATIENT
Start: 2021-08-06 | End: 2021-08-20

## 2021-08-06 RX ORDER — OMEPRAZOLE 20 MG/1
20 CAPSULE, DELAYED RELEASE ORAL 2 TIMES DAILY
Qty: 28 CAPSULE | Refills: 0 | Status: SHIPPED | OUTPATIENT
Start: 2021-08-06 | End: 2021-08-20

## 2021-08-06 NOTE — PROGRESS NOTES
Patient positive for H.Pylori and sending treatment to her pharmacy to start on today for this. Low Vitamin D level and sending replacement for this as well. Please inform her and if any questions to call me.

## 2021-08-18 ENCOUNTER — TELEPHONE (OUTPATIENT)
Dept: FAMILY MEDICINE CLINIC | Facility: CLINIC | Age: 21
End: 2021-08-18

## 2021-08-18 NOTE — TELEPHONE ENCOUNTER
Caller: Alaina Kennedy    Relationship: Self    Best call back number: [phone]    What is the best time to reach you: ANYTIME     Who are you requesting to speak with (clinical staff, provider,  specific staff member): CLINICAL STAFF     What was the call regarding: PATIENT SAYS THAT SHE WAS GIVEN A PRESCRIPTION FOR ACYCLOVIR. SHE IS WANTING TO KNOW WHERE THIS CAME FROM BECAUSE SHE DOES NOT RECALL BEING TOLD ABOUT THIS MEDICATION OR IF SHE HAS HERPES.     Do you require a callback: YES

## 2021-08-20 DIAGNOSIS — A04.8 BACTERIAL INFECTION DUE TO H. PYLORI: ICD-10-CM

## 2021-08-20 NOTE — TELEPHONE ENCOUNTER
Called and spoke with patient she states that [facility] pharmacy called her with rx refills and she doesn't even use this pharmacy. I cleared everything up that she does not have herpes and that rx was not for her.

## 2021-08-23 ENCOUNTER — TELEPHONE (OUTPATIENT)
Dept: FAMILY MEDICINE CLINIC | Facility: CLINIC | Age: 21
End: 2021-08-23

## 2021-08-23 NOTE — TELEPHONE ENCOUNTER
Please have the patient continue to take the clarithromycin.  She can take Zofran as needed for nausea.  Please ensure that she is taking the medication with plenty of liquids and can try taking with food to see if this helps eliminate the bad taste in her mouth.  If this does not improve side effects please have her contact clinic for possible medication change.

## 2021-08-23 NOTE — TELEPHONE ENCOUNTER
Caller: Alaina Kennedy    Relationship: Self    Best call back number: [phone]     What medications are you currently taking:   Current Outpatient Medications on File Prior to Visit   Medication Sig Dispense Refill   • fluconazole (Diflucan) 150 MG tablet Take 1 tablet day 1 po then 1 tablet day 3 if still symptomatic. 2 tablet 0   • ondansetron ODT (Zofran ODT) 4 MG disintegrating tablet Place 1 tablet on the tongue Every 8 (Eight) Hours As Needed for Nausea or Vomiting. 30 tablet 0   • vitamin D (ERGOCALCIFEROL) 1.25 MG (62671 UT) capsule capsule Take 1 capsule by mouth 1 (One) Time Per Week. 12 capsule 1     No current facility-administered medications on file prior to visit.          When did you start taking these medications: 8/23/21    Which medication are you concerned about: CLARITHROMYCIN 1 TABLET TWICE A DAY FOR 14 DAYS     Who prescribed you this medication: ILA HAMMOND     What are your concerns: PATIENT STATES SHE HAS TAKEN THIS MEDICATION FOR THE FIRST TIME TODAY 8/23/21 AND IS FEELING SICK DUE TO TAKING THE PRESCRIPTION. PATIENT STATES SHE FELT VERY NAUSEOUS 30 MINUTES AFTER TAKING THE MEDICATION AND CAN STILL TASTE THE MEDICATION HOURS AFTER TAKING THE MEDICATION. PATIENT STATES THE NAUSEA HAS SINCE SUBSIDED BUT STILL HAS THE TASTE OF THE MEDICATION LINGERING IN HER MOUTH. PATIENT WOULD LIKE TO KNOW IF SHE SHOULD CONTINUE TAKING THE MEDICATION AND IF THE TASTE OF THE MEDICATION AND THE NAUSEA IS NORMAL.     How long have you had these concerns:

## 2021-09-01 ENCOUNTER — APPOINTMENT (OUTPATIENT)
Dept: CT IMAGING | Facility: HOSPITAL | Age: 21
End: 2021-09-01

## 2021-09-01 ENCOUNTER — HOSPITAL ENCOUNTER (EMERGENCY)
Facility: HOSPITAL | Age: 21
Discharge: HOME | End: 2021-09-01
Attending: EMERGENCY MEDICINE | Admitting: EMERGENCY MEDICINE

## 2021-09-01 VITALS
DIASTOLIC BLOOD PRESSURE: 66 MMHG | HEIGHT: 62 IN | WEIGHT: 158 LBS | RESPIRATION RATE: 16 BRPM | SYSTOLIC BLOOD PRESSURE: 123 MMHG | OXYGEN SATURATION: 98 % | TEMPERATURE: 98.9 F | HEART RATE: 64 BPM | BODY MASS INDEX: 29.08 KG/M2

## 2021-09-01 DIAGNOSIS — K52.9 COLITIS: Primary | ICD-10-CM

## 2021-09-01 LAB
ALBUMIN SERPL-MCNC: 4.1 G/DL (ref 3.5–5.2)
ALBUMIN/GLOB SERPL: 1.1 G/DL
ALP SERPL-CCNC: 108 U/L (ref 39–117)
ALT SERPL W P-5'-P-CCNC: 21 U/L (ref 1–33)
ANION GAP SERPL CALCULATED.3IONS-SCNC: 10 MMOL/L (ref 5–15)
AST SERPL-CCNC: 22 U/L (ref 1–32)
B-HCG UR QL: NEGATIVE
BACTERIA UR QL AUTO: ABNORMAL /HPF
BASOPHILS # BLD AUTO: 0.03 10*3/MM3 (ref 0–0.2)
BASOPHILS NFR BLD AUTO: 0.3 % (ref 0–1.5)
BILIRUB SERPL-MCNC: 0.3 MG/DL (ref 0–1.2)
BILIRUB UR QL STRIP: NEGATIVE
BUN SERPL-MCNC: 14 MG/DL (ref 6–20)
BUN/CREAT SERPL: 17.1 (ref 7–25)
CALCIUM SPEC-SCNC: 9.3 MG/DL (ref 8.6–10.5)
CHLORIDE SERPL-SCNC: 103 MMOL/L (ref 98–107)
CLARITY UR: ABNORMAL
CO2 SERPL-SCNC: 24 MMOL/L (ref 22–29)
COLOR UR: YELLOW
CREAT SERPL-MCNC: 0.82 MG/DL (ref 0.57–1)
DEPRECATED RDW RBC AUTO: 41.9 FL (ref 37–54)
EOSINOPHIL # BLD AUTO: 0.33 10*3/MM3 (ref 0–0.4)
EOSINOPHIL NFR BLD AUTO: 2.8 % (ref 0.3–6.2)
ERYTHROCYTE [DISTWIDTH] IN BLOOD BY AUTOMATED COUNT: 12.9 % (ref 12.3–15.4)
GFR SERPL CREATININE-BSD FRML MDRD: 108 ML/MIN/1.73
GLOBULIN UR ELPH-MCNC: 3.6 GM/DL
GLUCOSE SERPL-MCNC: 100 MG/DL (ref 65–99)
GLUCOSE UR STRIP-MCNC: NEGATIVE MG/DL
HCT VFR BLD AUTO: 40.5 % (ref 34–46.6)
HGB BLD-MCNC: 13.4 G/DL (ref 12–15.9)
HGB UR QL STRIP.AUTO: NEGATIVE
HOLD SPECIMEN: NORMAL
HYALINE CASTS UR QL AUTO: ABNORMAL /LPF
IMM GRANULOCYTES # BLD AUTO: 0.03 10*3/MM3 (ref 0–0.05)
IMM GRANULOCYTES NFR BLD AUTO: 0.3 % (ref 0–0.5)
INTERNAL NEGATIVE CONTROL: NORMAL
INTERNAL POSITIVE CONTROL: NORMAL
KETONES UR QL STRIP: ABNORMAL
LEUKOCYTE ESTERASE UR QL STRIP.AUTO: ABNORMAL
LIPASE SERPL-CCNC: 19 U/L (ref 13–60)
LYMPHOCYTES # BLD AUTO: 2.22 10*3/MM3 (ref 0.7–3.1)
LYMPHOCYTES NFR BLD AUTO: 19.1 % (ref 19.6–45.3)
Lab: NORMAL
MCH RBC QN AUTO: 29.6 PG (ref 26.6–33)
MCHC RBC AUTO-ENTMCNC: 33.1 G/DL (ref 31.5–35.7)
MCV RBC AUTO: 89.4 FL (ref 79–97)
MONOCYTES # BLD AUTO: 0.68 10*3/MM3 (ref 0.1–0.9)
MONOCYTES NFR BLD AUTO: 5.8 % (ref 5–12)
NEUTROPHILS NFR BLD AUTO: 71.7 % (ref 42.7–76)
NEUTROPHILS NFR BLD AUTO: 8.34 10*3/MM3 (ref 1.7–7)
NITRITE UR QL STRIP: NEGATIVE
NRBC BLD AUTO-RTO: 0 /100 WBC (ref 0–0.2)
PH UR STRIP.AUTO: 6.5 [PH] (ref 5–8)
PLATELET # BLD AUTO: 304 10*3/MM3 (ref 140–450)
PMV BLD AUTO: 10 FL (ref 6–12)
POTASSIUM SERPL-SCNC: 4 MMOL/L (ref 3.5–5.2)
PROT SERPL-MCNC: 7.7 G/DL (ref 6–8.5)
PROT UR QL STRIP: NEGATIVE
RBC # BLD AUTO: 4.53 10*6/MM3 (ref 3.77–5.28)
RBC # UR: ABNORMAL /HPF
REF LAB TEST METHOD: ABNORMAL
SODIUM SERPL-SCNC: 137 MMOL/L (ref 136–145)
SP GR UR STRIP: >=1.03 (ref 1–1.03)
SQUAMOUS #/AREA URNS HPF: ABNORMAL /HPF
UROBILINOGEN UR QL STRIP: ABNORMAL
WBC # BLD AUTO: 11.63 10*3/MM3 (ref 3.4–10.8)
WBC UR QL AUTO: ABNORMAL /HPF
WHOLE BLOOD HOLD SPECIMEN: NORMAL
WHOLE BLOOD HOLD SPECIMEN: NORMAL

## 2021-09-01 PROCEDURE — 25010000002 ONDANSETRON PER 1 MG: Performed by: EMERGENCY MEDICINE

## 2021-09-01 PROCEDURE — 85025 COMPLETE CBC W/AUTO DIFF WBC: CPT | Performed by: EMERGENCY MEDICINE

## 2021-09-01 PROCEDURE — 80053 COMPREHEN METABOLIC PANEL: CPT | Performed by: EMERGENCY MEDICINE

## 2021-09-01 PROCEDURE — 74177 CT ABD & PELVIS W/CONTRAST: CPT

## 2021-09-01 PROCEDURE — 81025 URINE PREGNANCY TEST: CPT | Performed by: EMERGENCY MEDICINE

## 2021-09-01 PROCEDURE — 99283 EMERGENCY DEPT VISIT LOW MDM: CPT

## 2021-09-01 PROCEDURE — 81001 URINALYSIS AUTO W/SCOPE: CPT | Performed by: EMERGENCY MEDICINE

## 2021-09-01 PROCEDURE — 25010000002 HYDROMORPHONE PER 4 MG: Performed by: EMERGENCY MEDICINE

## 2021-09-01 PROCEDURE — 25010000002 IOPAMIDOL 61 % SOLUTION: Performed by: EMERGENCY MEDICINE

## 2021-09-01 PROCEDURE — 96375 TX/PRO/DX INJ NEW DRUG ADDON: CPT

## 2021-09-01 PROCEDURE — 96374 THER/PROPH/DIAG INJ IV PUSH: CPT

## 2021-09-01 PROCEDURE — 83690 ASSAY OF LIPASE: CPT | Performed by: EMERGENCY MEDICINE

## 2021-09-01 RX ORDER — ONDANSETRON 4 MG/1
4 TABLET, FILM COATED ORAL EVERY 8 HOURS PRN
Qty: 15 TABLET | Refills: 0 | Status: SHIPPED | OUTPATIENT
Start: 2021-09-01 | End: 2021-10-20

## 2021-09-01 RX ORDER — DICYCLOMINE HYDROCHLORIDE 10 MG/1
20 CAPSULE ORAL ONCE
Status: COMPLETED | OUTPATIENT
Start: 2021-09-01 | End: 2021-09-01

## 2021-09-01 RX ORDER — AMOXICILLIN AND CLAVULANATE POTASSIUM 875; 125 MG/1; MG/1
1 TABLET, FILM COATED ORAL ONCE
Status: COMPLETED | OUTPATIENT
Start: 2021-09-01 | End: 2021-09-01

## 2021-09-01 RX ORDER — AMOXICILLIN AND CLAVULANATE POTASSIUM 875; 125 MG/1; MG/1
1 TABLET, FILM COATED ORAL 2 TIMES DAILY
Qty: 20 TABLET | Refills: 0 | Status: SHIPPED | OUTPATIENT
Start: 2021-09-01 | End: 2021-09-13

## 2021-09-01 RX ORDER — DICYCLOMINE HCL 20 MG
20 TABLET ORAL EVERY 8 HOURS PRN
Qty: 20 TABLET | Refills: 0 | Status: SHIPPED | OUTPATIENT
Start: 2021-09-01 | End: 2021-10-20

## 2021-09-01 RX ORDER — HYDROMORPHONE HYDROCHLORIDE 1 MG/ML
0.5 INJECTION, SOLUTION INTRAMUSCULAR; INTRAVENOUS; SUBCUTANEOUS ONCE
Status: COMPLETED | OUTPATIENT
Start: 2021-09-01 | End: 2021-09-01

## 2021-09-01 RX ORDER — SODIUM CHLORIDE 9 MG/ML
10 INJECTION INTRAVENOUS AS NEEDED
Status: DISCONTINUED | OUTPATIENT
Start: 2021-09-01 | End: 2021-09-01 | Stop reason: HOSPADM

## 2021-09-01 RX ORDER — ONDANSETRON 2 MG/ML
4 INJECTION INTRAMUSCULAR; INTRAVENOUS ONCE
Status: COMPLETED | OUTPATIENT
Start: 2021-09-01 | End: 2021-09-01

## 2021-09-01 RX ADMIN — HYDROMORPHONE HYDROCHLORIDE 0.5 MG: 1 INJECTION, SOLUTION INTRAMUSCULAR; INTRAVENOUS; SUBCUTANEOUS at 10:46

## 2021-09-01 RX ADMIN — IOPAMIDOL 100 ML: 612 INJECTION, SOLUTION INTRAVENOUS at 11:40

## 2021-09-01 RX ADMIN — SODIUM CHLORIDE 1000 ML: 9 INJECTION, SOLUTION INTRAVENOUS at 10:44

## 2021-09-01 RX ADMIN — ONDANSETRON 4 MG: 2 INJECTION INTRAMUSCULAR; INTRAVENOUS at 10:45

## 2021-09-01 RX ADMIN — DICYCLOMINE HYDROCHLORIDE 20 MG: 10 CAPSULE ORAL at 13:12

## 2021-09-01 RX ADMIN — AMOXICILLIN AND CLAVULANATE POTASSIUM 1 TABLET: 875; 125 TABLET, FILM COATED ORAL at 13:11

## 2021-09-01 NOTE — ED PROVIDER NOTES
"Subjective   20-year-old female presents for evaluation of abdominal pain.  She was sent here by urgent care.  She states that since approximately 1 AM she has been experiencing intermittent episodes of abdominal pain that feel like \"contractions.\"  She states that her pain started in her lower abdomen and moved upward toward her upper abdomen.  She endorses accompanying nausea but no vomiting.  No fevers.  No known triggers for her symptoms.  No vaginal bleeding.  No diarrhea.  Pain is currently 6 out of 10 in severity.  No aggravating or alleviating factors.          Review of Systems   Gastrointestinal: Positive for abdominal pain.   All other systems reviewed and are negative.      Past Medical History:   Diagnosis Date   • Chlamydia     a year ago, treated   • Gonorrhea     a year ago, treated       No Known Allergies    Past Surgical History:   Procedure Laterality Date   • WISDOM TOOTH EXTRACTION         Family History   Problem Relation Age of Onset   • Diabetes Maternal Grandfather        Social History     Socioeconomic History   • Marital status: Single     Spouse name: Not on file   • Number of children: Not on file   • Years of education: Not on file   • Highest education level: Not on file   Tobacco Use   • Smoking status: Never Smoker   • Smokeless tobacco: Never Used   Vaping Use   • Vaping Use: Never used   Substance and Sexual Activity   • Alcohol use: Yes     Comment: socially    • Drug use: Not Currently     Types: Marijuana     Comment: last use July 2020   • Sexual activity: Yes     Partners: Female, Male     Birth control/protection: I.U.D.           Objective   Physical Exam  Vitals and nursing note reviewed.   Constitutional:       General: She is not in acute distress.     Appearance: She is well-developed. She is not diaphoretic.      Comments: Nontoxic-appearing female   HENT:      Head: Normocephalic and atraumatic.   Eyes:      Pupils: Pupils are equal, round, and reactive to light. " "  Cardiovascular:      Rate and Rhythm: Normal rate and regular rhythm.      Heart sounds: Normal heart sounds. No murmur heard.   No friction rub. No gallop.    Pulmonary:      Effort: Pulmonary effort is normal. No respiratory distress.      Breath sounds: Normal breath sounds. No wheezing or rales.   Abdominal:      General: Bowel sounds are normal. There is no distension.      Palpations: Abdomen is soft. There is no mass.      Tenderness: There is abdominal tenderness. There is guarding. There is no rebound.      Comments: Mild, diffuse, nonfocal abdominal tenderness with voluntary guarding present, no pain out of proportion to exam   Genitourinary:     Comments: No CVA tenderness noted  Musculoskeletal:         General: Normal range of motion.      Cervical back: Neck supple.   Skin:     General: Skin is warm and dry.      Findings: No erythema or rash.   Neurological:      General: No focal deficit present.      Mental Status: She is alert and oriented to person, place, and time.   Psychiatric:         Mood and Affect: Mood normal.         Thought Content: Thought content normal.         Judgment: Judgment normal.         Procedures           ED Course  ED Course as of Sep 01 1525   Wed Sep 01, 2021   1009 20-year-old female presents for evaluation of abdominal pain.  She states that last night at approximately 1 AM she began experiencing lower abdominal cramps that have radiated upward and felt like \"contractions\" since that time.    [DD]   1011 On arrival to the ED, the patient is nontoxic-appearing.  On exam, the patient has mild, diffuse, nonfocal abdominal tenderness with voluntary guarding present.  We will obtain labs and imaging, and we will reassess following initial interventions.    [DD]   1143 Labs are unrevealing.    [DD]   1207 CT is suggestive of colitis.  Upon reevaluation, the patient feels much improved and is tolerating p.o. without difficulty.  She is actually requesting to eat.  " Clinically, she does not appear to have a bowel obstruction at this time, and given her well appearance and overall clinical presentation, feel that she can be managed as an outpatient at this point.  Prescription for Augmentin, Zofran, and Bentyl.  She will follow-up with her primary care physician within the next week.  Agreeable with plan and given appropriate strict return precautions.    [DD]      ED Course User Index  [DD] Lebron Roche MD                                 Recent Results (from the past 24 hour(s))   POC Urinalysis Dipstick, Multipro (Automated dipstick)    Collection Time: 09/01/21  9:03 AM    Specimen: Urine   Result Value Ref Range    Color Yellow Yellow, Straw, Dark Yellow, Purnima    Clarity, UA Clear Clear    Glucose, UA Negative Negative, 1000 mg/dL (3+) mg/dL    Bilirubin Negative Negative    Ketones, UA 1+ (A) Negative    Specific Gravity  1.030 1.005 - 1.030    Blood, UA Negative Negative    pH, Urine 6.0 5.0 - 8.0    Protein, POC Trace (A) Negative mg/dL    Urobilinogen, UA Normal Normal    Nitrite, UA Negative Negative    Leukocytes Trace (A) Negative   POCT Pregnancy, urine    Collection Time: 09/01/21  9:05 AM    Specimen: Urine   Result Value Ref Range    HCG, Urine, QL Negative Negative    Lot Number [id number]     Internal Positive Control Positive Positive, Passed    Internal Negative Control Negative Negative, Passed   Comprehensive Metabolic Panel    Collection Time: 09/01/21  9:27 AM    Specimen: Blood   Result Value Ref Range    Glucose 100 (H) 65 - 99 mg/dL    BUN 14 6 - 20 mg/dL    Creatinine 0.82 0.57 - 1.00 mg/dL    Sodium 137 136 - 145 mmol/L    Potassium 4.0 3.5 - 5.2 mmol/L    Chloride 103 98 - 107 mmol/L    CO2 24.0 22.0 - 29.0 mmol/L    Calcium 9.3 8.6 - 10.5 mg/dL    Total Protein 7.7 6.0 - 8.5 g/dL    Albumin 4.10 3.50 - 5.20 g/dL    ALT (SGPT) 21 1 - 33 U/L    AST (SGOT) 22 1 - 32 U/L    Alkaline Phosphatase 108 39 - 117 U/L    Total Bilirubin 0.3 0.0 - 1.2  mg/dL    eGFR  African Amer 108 >60 mL/min/1.73    Globulin 3.6 gm/dL    A/G Ratio 1.1 g/dL    BUN/Creatinine Ratio 17.1 7.0 - 25.0    Anion Gap 10.0 5.0 - 15.0 mmol/L   Lipase    Collection Time: 09/01/21  9:27 AM    Specimen: Blood   Result Value Ref Range    Lipase 19 13 - 60 U/L   Green Top (Gel)    Collection Time: 09/01/21  9:27 AM   Result Value Ref Range    Extra Tube Hold for add-ons.    Lavender Top    Collection Time: 09/01/21  9:27 AM   Result Value Ref Range    Extra Tube hold for add-on    Gold Top - SST    Collection Time: 09/01/21  9:27 AM   Result Value Ref Range    Extra Tube Hold for add-ons.    Light Blue Top    Collection Time: 09/01/21  9:27 AM   Result Value Ref Range    Extra Tube hold for add-on    CBC Auto Differential    Collection Time: 09/01/21  9:27 AM    Specimen: Blood   Result Value Ref Range    WBC 11.63 (H) 3.40 - 10.80 10*3/mm3    RBC 4.53 3.77 - 5.28 10*6/mm3    Hemoglobin 13.4 12.0 - 15.9 g/dL    Hematocrit 40.5 34.0 - 46.6 %    MCV 89.4 79.0 - 97.0 fL    MCH 29.6 26.6 - 33.0 pg    MCHC 33.1 31.5 - 35.7 g/dL    RDW 12.9 12.3 - 15.4 %    RDW-SD 41.9 37.0 - 54.0 fl    MPV 10.0 6.0 - 12.0 fL    Platelets 304 140 - 450 10*3/mm3    Neutrophil % 71.7 42.7 - 76.0 %    Lymphocyte % 19.1 (L) 19.6 - 45.3 %    Monocyte % 5.8 5.0 - 12.0 %    Eosinophil % 2.8 0.3 - 6.2 %    Basophil % 0.3 0.0 - 1.5 %    Immature Grans % 0.3 0.0 - 0.5 %    Neutrophils, Absolute 8.34 (H) 1.70 - 7.00 10*3/mm3    Lymphocytes, Absolute 2.22 0.70 - 3.10 10*3/mm3    Monocytes, Absolute 0.68 0.10 - 0.90 10*3/mm3    Eosinophils, Absolute 0.33 0.00 - 0.40 10*3/mm3    Basophils, Absolute 0.03 0.00 - 0.20 10*3/mm3    Immature Grans, Absolute 0.03 0.00 - 0.05 10*3/mm3    nRBC 0.0 0.0 - 0.2 /100 WBC   Gray Top    Collection Time: 09/01/21  9:28 AM   Result Value Ref Range    Extra Tube Hold for add-ons.    Urinalysis With Microscopic If Indicated (No Culture) - Urine, Clean Catch    Collection Time: 09/01/21 10:36 AM     Specimen: Urine, Clean Catch   Result Value Ref Range    Color, UA Yellow Yellow, Straw    Appearance, UA Cloudy (A) Clear    pH, UA 6.5 5.0 - 8.0    Specific Gravity, UA >=1.030 1.001 - 1.030    Glucose, UA Negative Negative    Ketones, UA Trace (A) Negative    Bilirubin, UA Negative Negative    Blood, UA Negative Negative    Protein, UA Negative Negative    Leuk Esterase, UA Trace (A) Negative    Nitrite, UA Negative Negative    Urobilinogen, UA 1.0 E.U./dL 0.2 - 1.0 E.U./dL   Urinalysis, Microscopic Only - Urine, Clean Catch    Collection Time: 09/01/21 10:36 AM    Specimen: Urine, Clean Catch   Result Value Ref Range    RBC, UA 0-2 None Seen, 0-2 /HPF    WBC, UA 6-12 (A) None Seen, 0-2 /HPF    Bacteria, UA 1+ (A) None Seen, Trace /HPF    Squamous Epithelial Cells, UA 7-12 (A) None Seen, 0-2 /HPF    Hyaline Casts, UA 7-12 0 - 6 /LPF    Methodology Automated Microscopy    POC Pregnancy, Urine    Collection Time: 09/01/21 10:37 AM    Specimen: Urine   Result Value Ref Range    HCG, Urine, QL Negative Negative    Lot Number [id number]     Internal Positive Control Passed Positive, Passed    Internal Negative Control Passed Negative, Passed     Note: In addition to lab results from this visit, the labs listed above may include labs taken at another facility or during a different encounter within the last 24 hours. Please correlate lab times with ED admission and discharge times for further clarification of the services performed during this visit.    CT Abdomen Pelvis With Contrast   Preliminary Result   There is abnormal wall thickening identified of the distal   descending colon and sigmoid colon to suggest a colitis with change in   caliber of the bowel with distention of the proximal colon filled with   fluid and air as well as stool seen in the cecum. The small bowel is   unremarkable. Findings suggesting possible partial obstruction. The   remainder of the abdomen and pelvis is unremarkable.       D:  09/01/2021  "  E:  09/01/2021                Vitals:    09/01/21 0920 09/01/21 1040   BP: 96/70 123/66   Patient Position: Sitting    Pulse: 66 64   Resp: 16    Temp: 98.9 °F (37.2 °C)    SpO2: 100% 98%   Weight: 71.7 kg (158 lb)    Height: 157.5 cm (62\")      Medications   sodium chloride 0.9 % bolus 1,000 mL (0 mL Intravenous Stopped 9/1/21 1312)   HYDROmorphone (DILAUDID) injection 0.5 mg (0.5 mg Intravenous Given 9/1/21 1046)   ondansetron (ZOFRAN) injection 4 mg (4 mg Intravenous Given 9/1/21 1045)   iopamidol (ISOVUE-300) 61 % injection 100 mL (100 mL Intravenous Given 9/1/21 1140)   dicyclomine (BENTYL) capsule 20 mg (20 mg Oral Given 9/1/21 1312)   amoxicillin-clavulanate (AUGMENTIN) 875-125 MG per tablet 1 tablet (1 tablet Oral Given 9/1/21 1311)     ECG/EMG Results (last 24 hours)     ** No results found for the last 24 hours. **        No orders to display                 MDM    Final diagnoses:   Colitis       ED Disposition  ED Disposition     ED Disposition Condition Comment    Discharge Stable           Mayda Roth, APRN  [address] [facility] [phone]    In 1 week           Medication List      New Prescriptions    amoxicillin-clavulanate 875-125 MG per tablet  Commonly known as: AUGMENTIN  Take 1 tablet by mouth 2 (Two) Times a Day.     dicyclomine 20 MG tablet  Commonly known as: BENTYL  Take 1 tablet by mouth Every 8 (Eight) Hours As Needed (pain, cramps).     ondansetron 4 MG tablet  Commonly known as: ZOFRAN  Take 1 tablet by mouth Every 8 (Eight) Hours As Needed for Nausea.           Where to Get Your Medications      These medications were sent to [facility] [address] [place] [address] [address] [place] [address] - [phone]  - [phone] [place] [address]    Phone: [phone]   · amoxicillin-clavulanate 875-125 MG per tablet  · dicyclomine 20 MG tablet  · ondansetron 4 MG tablet          Lebron Roche MD  09/01/21 1525    "

## 2021-09-13 ENCOUNTER — OFFICE VISIT (OUTPATIENT)
Dept: FAMILY MEDICINE CLINIC | Facility: CLINIC | Age: 21
End: 2021-09-13

## 2021-09-13 VITALS
HEIGHT: 62 IN | TEMPERATURE: 97.8 F | BODY MASS INDEX: 27.27 KG/M2 | SYSTOLIC BLOOD PRESSURE: 100 MMHG | OXYGEN SATURATION: 100 % | WEIGHT: 148.2 LBS | HEART RATE: 71 BPM | RESPIRATION RATE: 18 BRPM | DIASTOLIC BLOOD PRESSURE: 68 MMHG

## 2021-09-13 DIAGNOSIS — K52.9 COLITIS: Primary | ICD-10-CM

## 2021-09-13 DIAGNOSIS — A04.8 BACTERIAL INFECTION DUE TO H. PYLORI: ICD-10-CM

## 2021-09-13 DIAGNOSIS — B37.31 VAGINAL YEAST INFECTION: ICD-10-CM

## 2021-09-13 PROCEDURE — 99213 OFFICE O/P EST LOW 20 MIN: CPT | Performed by: NURSE PRACTITIONER

## 2021-09-13 RX ORDER — FLUCONAZOLE 150 MG/1
TABLET ORAL
Qty: 2 TABLET | Refills: 0 | Status: SHIPPED | OUTPATIENT
Start: 2021-09-13 | End: 2022-04-11

## 2021-09-13 RX ORDER — CLARITHROMYCIN 500 MG/1
500 TABLET, COATED ORAL 2 TIMES DAILY
Qty: 28 TABLET | Refills: 0 | Status: SHIPPED | OUTPATIENT
Start: 2021-09-13 | End: 2021-09-27

## 2021-09-13 RX ORDER — OMEPRAZOLE 20 MG/1
20 CAPSULE, DELAYED RELEASE ORAL 2 TIMES DAILY
Qty: 28 CAPSULE | Refills: 0 | Status: SHIPPED | OUTPATIENT
Start: 2021-09-13 | End: 2021-09-27

## 2021-09-13 RX ORDER — AMOXICILLIN 500 MG/1
500 TABLET, FILM COATED ORAL EVERY 12 HOURS SCHEDULED
Qty: 28 TABLET | Refills: 0 | Status: SHIPPED | OUTPATIENT
Start: 2021-09-13 | End: 2021-09-27

## 2021-09-13 NOTE — ASSESSMENT & PLAN NOTE
Patient did not receive Amoxicillin and Omeprazole ordered after last visit for H.Pylori treatment and only had clarithromycin. Still symptomatic and re-ordered regimen to treat H.Pylori. Informed patient to return to office for follow up in 2 months and will recheck H.Pylori. Instructed to refrain from antacids 1-2 weeks before visit.

## 2021-09-13 NOTE — PROGRESS NOTES
"Chief Complaint  Hospital Follow Up Visit (discharge 09/01/2021.) and Ulcerative Colitis    Subjective          Alaina Kennedy presents to [facility]  Patient presents for follow up colitis after emergency room visit on 09/01/2021. She was discharged and not admitted in patient.  She was treated with Augmentin and has completed this regimen. She also was prescribed Bentyl and Zofran PRN. She denies any further abdominal pain and reports regular bowel movements with no nausea or vomiting. She was previously treated for H. Pylori at last office visit and states she only received Clarithromycin from her pharmacy and did not get Omeprazole or Amoxicillin. States she is still experiencing same symptoms of bloating and poor appetite.     Ulcerative Colitis  This is a new problem. The current episode started 1 to 4 weeks ago. The problem has been resolved. Pertinent negatives include no abdominal pain, change in bowel habit, chills, fever, nausea, urinary symptoms or vomiting.       Objective   Vital Signs:   /68   Pulse 71   Temp 97.8 °F (36.6 °C)   Resp 18   Ht 157.5 cm (62\")   Wt 67.2 kg (148 lb 3.2 oz)   SpO2 100%   BMI 27.11 kg/m²     Physical Exam  Vitals and nursing note reviewed.   Constitutional:       General: She is not in acute distress.     Appearance: Normal appearance.   HENT:      Head: Normocephalic and atraumatic.      Right Ear: External ear normal.      Left Ear: External ear normal.      Nose: Nose normal.   Cardiovascular:      Rate and Rhythm: Normal rate and regular rhythm.      Pulses: Normal pulses.      Heart sounds: Normal heart sounds.   Pulmonary:      Effort: Pulmonary effort is normal.      Breath sounds: Normal breath sounds.   Abdominal:      General: Abdomen is flat. Bowel sounds are normal. There is no distension.      Palpations: Abdomen is soft.      Tenderness: There is no abdominal tenderness. There is no guarding.   Skin:     General: " Skin is warm and dry.   Neurological:      Mental Status: She is alert and oriented to person, place, and time.   Psychiatric:         Mood and Affect: Mood normal.         Behavior: Behavior normal.         Thought Content: Thought content normal.         Judgment: Judgment normal.        Result Review :     Common labs    Common Labsle 3/21/21 8/3/21 8/3/21 9/1/21 9/1/21     1046 1046 0927 0927   Glucose   77  100 (A)   BUN   16  14   Creatinine   0.99  0.82   eGFR African Am   87  108   Sodium   139  137   Potassium   4.9  4.0   Chloride   106  103   Calcium   9.4  9.3   Albumin   3.90  4.10   Total Bilirubin   0.2  0.3   Alkaline Phosphatase   102  108   AST (SGOT)   24  22   ALT (SGPT)   22  21   WBC 13.98 (A) 9.03  11.63 (A)    Hemoglobin 11.7 (A) 13.5  13.4    Hematocrit 36.4 40.1  40.5    Platelets 216 298  304    (A) Abnormal value            Data reviewed: Recent hospitalization notes ER 09/01/2021 visit          Assessment and Plan    Diagnoses and all orders for this visit:    1. Colitis (Primary)  Assessment & Plan:  Diet and activity as tolerated  Resolved   Seek emergent treatment if severe abdominal pain or persistent vomiting  Education per colitis provided to patient      2. Vaginal yeast infection  Assessment & Plan:  Diflucan PRN yeast infection that patient reports occurs with antibiotic use.    Orders:  -     fluconazole (Diflucan) 150 MG tablet; Take 1 tablet day 1 po then 1 tablet day 3 if still symptomatic.  Dispense: 2 tablet; Refill: 0    3. Bacterial infection due to H. pylori  Assessment & Plan:  Patient did not receive Amoxicillin and Omeprazole ordered after last visit for H.Pylori treatment and only had clarithromycin. Still symptomatic and re-ordered regimen to treat H.Pylori. Informed patient to return to office for follow up in 2 months and will recheck H.Pylori. Instructed to refrain from antacids 1-2 weeks before visit.    Orders:  -     H.pylori,IgG / IgA Antibodies; Future  -      clarithromycin (BIAXIN) 500 MG tablet; Take 1 tablet by mouth 2 (Two) Times a Day for 14 days.  Dispense: 28 tablet; Refill: 0  -     omeprazole (priLOSEC) 20 MG capsule; Take 1 capsule by mouth 2 (two) times a day for 14 days.  Dispense: 28 capsule; Refill: 0  -     amoxicillin (AMOXIL) 500 MG tablet; Take 1 tablet by mouth Every 12 (Twelve) Hours for 14 days.  Dispense: 28 tablet; Refill: 0    I spent 22 minutes caring for Alaina on this date of service. This time includes time spent by me in the following activities:preparing for the visit, reviewing tests, obtaining and/or reviewing a separately obtained history, performing a medically appropriate examination and/or evaluation , counseling and educating the patient/family/caregiver, ordering medications, tests, or procedures, referring and communicating with other health care professionals , documenting information in the medical record and independently interpreting results and communicating that information with the patient/family/caregiver  Follow Up   Return if symptoms worsen or fail to improve.  Patient was given instructions and counseling regarding her condition or for health maintenance advice. Please see specific information pulled into the AVS if appropriate.

## 2021-09-13 NOTE — ASSESSMENT & PLAN NOTE
Diet and activity as tolerated  Resolved   Seek emergent treatment if severe abdominal pain or persistent vomiting  Education per colitis provided to patient

## 2021-10-20 ENCOUNTER — OFFICE VISIT (OUTPATIENT)
Dept: FAMILY MEDICINE CLINIC | Facility: CLINIC | Age: 21
End: 2021-10-20

## 2021-10-20 VITALS
HEART RATE: 74 BPM | SYSTOLIC BLOOD PRESSURE: 110 MMHG | TEMPERATURE: 98 F | RESPIRATION RATE: 18 BRPM | HEIGHT: 62 IN | BODY MASS INDEX: 27.09 KG/M2 | WEIGHT: 147.2 LBS | OXYGEN SATURATION: 98 % | DIASTOLIC BLOOD PRESSURE: 64 MMHG

## 2021-10-20 DIAGNOSIS — A04.8 BACTERIAL INFECTION DUE TO H. PYLORI: Primary | ICD-10-CM

## 2021-10-20 DIAGNOSIS — Z20.2 POSSIBLE EXPOSURE TO STD: ICD-10-CM

## 2021-10-20 DIAGNOSIS — L70.0 ACNE VULGARIS: ICD-10-CM

## 2021-10-20 DIAGNOSIS — N94.9 VAGINAL BURNING: ICD-10-CM

## 2021-10-20 DIAGNOSIS — R14.0 ABDOMINAL BLOATING: ICD-10-CM

## 2021-10-20 PROBLEM — N94.89 VAGINAL BURNING: Status: ACTIVE | Noted: 2021-10-20

## 2021-10-20 PROCEDURE — 99214 OFFICE O/P EST MOD 30 MIN: CPT | Performed by: NURSE PRACTITIONER

## 2021-10-20 PROCEDURE — 86677 HELICOBACTER PYLORI ANTIBODY: CPT | Performed by: NURSE PRACTITIONER

## 2021-10-20 RX ORDER — CLINDAMYCIN PHOSPHATE 11.9 MG/ML
SOLUTION TOPICAL 2 TIMES DAILY
Qty: 1 EACH | Refills: 3 | Status: SHIPPED | OUTPATIENT
Start: 2021-10-20 | End: 2021-11-12 | Stop reason: SDUPTHER

## 2021-10-20 RX ORDER — TRETINOIN 1 MG/G
1 CREAM TOPICAL NIGHTLY
Qty: 45 G | Refills: 5 | Status: SHIPPED | OUTPATIENT
Start: 2021-10-20 | End: 2023-02-15

## 2021-10-20 NOTE — ASSESSMENT & PLAN NOTE
STI testing today  Suggested she apply Aquaphor PRN for vaginal irritation or burning and use lubrication with sexual activity to prevent irritation

## 2021-10-20 NOTE — ASSESSMENT & PLAN NOTE
Use medications as directed  Wash face morning and night with over the counter acne cleanser such as Neutrogena.  Follow up 3 months

## 2021-10-20 NOTE — PROGRESS NOTES
"Chief Complaint  Abdominal Pain (bloating)    Subjective          Alaina Kennedy presents to [facility]  Patient presents to office with complaints of weight loss and and abdominal bloating. History of H.Pylori. Recent visit to urgent care for abdominal pain on 09/01/2021 for abdominal pain and diagnosed with colitis and ct scan of abdomen with impression of suggestive colitis and possible partial obstruction. States abdominal pain has subsided since ER visit and admits increased appetite. She has finished her regimen for H. Pylori treatment. We will retest today for bacteri. She is requesting STI testing today due to possible exposure and wants to be sure she does not have infection. Denies abnormal vaginal discharge or dysuria. Admits external vaginal burning that occurred in shower last week and has improved.   Patient complaints of facial acne that is worsening and requesting treatment for this.     Abdominal Pain        Objective   Vital Signs:   /64   Pulse 74   Temp 98 °F (36.7 °C)   Resp 18   Ht 157.5 cm (62\")   Wt 66.8 kg (147 lb 3.2 oz)   SpO2 98%   BMI 26.92 kg/m²     Physical Exam  Vitals and nursing note reviewed.   Constitutional:       General: She is not in acute distress.     Appearance: Normal appearance.   HENT:      Head: Normocephalic.      Right Ear: External ear normal.      Left Ear: External ear normal.      Nose: Nose normal.   Eyes:      Extraocular Movements: Extraocular movements intact.      Conjunctiva/sclera: Conjunctivae normal.      Pupils: Pupils are equal, round, and reactive to light.   Cardiovascular:      Rate and Rhythm: Normal rate and regular rhythm.      Heart sounds: Normal heart sounds.   Pulmonary:      Effort: Pulmonary effort is normal.      Breath sounds: Normal breath sounds.   Abdominal:      General: Bowel sounds are normal. There is no distension.      Palpations: Abdomen is soft. There is no mass.      Tenderness: " There is no abdominal tenderness. There is no guarding.   Musculoskeletal:      Right lower leg: No edema.      Left lower leg: No edema.   Skin:     General: Skin is warm and dry.      Findings: Acne present.   Neurological:      Mental Status: She is alert and oriented to person, place, and time.   Psychiatric:         Mood and Affect: Mood normal.         Behavior: Behavior normal.         Thought Content: Thought content normal.         Judgment: Judgment normal.        Result Review :     Common labs    Common Labsle 3/21/21 8/3/21 8/3/21 9/1/21 9/1/21     1046 1046 0927 0927   Glucose   77  100 (A)   BUN   16  14   Creatinine   0.99  0.82   eGFR African Am   87  108   Sodium   139  137   Potassium   4.9  4.0   Chloride   106  103   Calcium   9.4  9.3   Albumin   3.90  4.10   Total Bilirubin   0.2  0.3   Alkaline Phosphatase   102  108   AST (SGOT)   24  22   ALT (SGPT)   22  21   WBC 13.98 (A) 9.03  11.63 (A)    Hemoglobin 11.7 (A) 13.5  13.4    Hematocrit 36.4 40.1  40.5    Platelets 216 298  304    (A) Abnormal value            Data reviewed: Radiologic studies Ct scan abdomen and pelvis 09/01/2021          Assessment and Plan    Diagnoses and all orders for this visit:    1. Bacterial infection due to H. pylori (Primary)  -     H.pylori,IgG / IgA Antibodies    2. Abdominal bloating  Assessment & Plan:  Reassess for H.Pylori as patient has finished previous treatment.  Reviewed previous UTC and ER visit reports and patient with improved symptoms and no abdominal pain at this time.   Decrease caffeine in diet and avoid spicy foods. Gas-x as needed.     Orders:  -     H.pylori,IgG / IgA Antibodies    3. Acne vulgaris  Assessment & Plan:  Use medications as directed  Wash face morning and night with over the counter acne cleanser such as Neutrogena.  Follow up 3 months    Orders:  -     tretinoin (RETIN-A) 0.1 % cream; Apply 1 application topically to the appropriate area as directed Every Night.  Dispense: 45  g; Refill: 5  -     clindamycin (Cleocin-T) 1 % external solution; Apply  topically to the appropriate area as directed 2 (Two) Times a Day.  Dispense: 1 each; Refill: 3    4. Vaginal burning  Assessment & Plan:  STI testing today  Suggested she apply Aquaphor PRN for vaginal irritation or burning and use lubrication with sexual activity to prevent irritation      5. Possible exposure to STD  Assessment & Plan:  One swab testing today    Orders:  -     OneSwab - Kit, Vagina  Will reassess for H.Pylori with labs today and start another treatment regimen if still positive.   STI testing with One swab today to assess for STD per patient request.  Use emollient for vaginal irritation and discomfort and lubricant with sexual activity.  Start tretinoin and clindamycin cream for facial acne.    I spent 30 minutes caring for Alaina on this date of service. This time includes time spent by me in the following activities:preparing for the visit, reviewing tests, obtaining and/or reviewing a separately obtained history, performing a medically appropriate examination and/or evaluation , counseling and educating the patient/family/caregiver, ordering medications, tests, or procedures, documenting information in the medical record and independently interpreting results and communicating that information with the patient/family/caregiver  Follow Up   Return in about 3 months (around 1/20/2022), or if symptoms worsen or fail to improve, for Next scheduled follow up acne.  Patient was given instructions and counseling regarding her condition or for health maintenance advice. Please see specific information pulled into the AVS if appropriate.

## 2021-10-20 NOTE — ASSESSMENT & PLAN NOTE
Reassess for H.Pylori as patient has finished previous treatment.  Reviewed previous UTC and ER visit reports and patient with improved symptoms and no abdominal pain at this time.   Decrease caffeine in diet and avoid spicy foods. Gas-x as needed.

## 2021-10-22 ENCOUNTER — TELEPHONE (OUTPATIENT)
Dept: FAMILY MEDICINE CLINIC | Facility: CLINIC | Age: 21
End: 2021-10-22

## 2021-10-22 NOTE — TELEPHONE ENCOUNTER
Caller: Alaina Kennedy    Relationship: Self    Best call back number: [phone]    Caller requesting test results:   What test was performed: LAB WORK    When was the test performed: Tuesday OR WEDNESDAY    Where was the test performed:  OFFICE    Additional notes:

## 2021-10-23 LAB
H PYLORI IGA SER-ACNC: 11.8 UNITS (ref 0–8.9)
H PYLORI IGG SER IA-ACNC: 0.21 INDEX VALUE (ref 0–0.79)

## 2021-10-27 ENCOUNTER — TELEPHONE (OUTPATIENT)
Dept: FAMILY MEDICINE CLINIC | Facility: CLINIC | Age: 21
End: 2021-10-27

## 2021-10-27 NOTE — TELEPHONE ENCOUNTER
Caller: Alaina Kennedy    Relationship: Self    Best call back number: [phone]    Caller requesting test results:     What test was performed: lstd/ sti    When was the test performed: 10/20/21    Where was the test performed: office    Additional notes: patient would like to know what the results of their tests were, they were informed the results would be back first part of the week

## 2021-10-28 DIAGNOSIS — B96.89 BV (BACTERIAL VAGINOSIS): Primary | ICD-10-CM

## 2021-10-28 DIAGNOSIS — N76.0 BV (BACTERIAL VAGINOSIS): Primary | ICD-10-CM

## 2021-10-28 RX ORDER — METRONIDAZOLE 500 MG/1
500 TABLET ORAL 2 TIMES DAILY
Qty: 14 TABLET | Refills: 0 | Status: SHIPPED | OUTPATIENT
Start: 2021-10-28 | End: 2022-04-11

## 2021-10-28 NOTE — TELEPHONE ENCOUNTER
Caller: Alaina Kennedy    Relationship: Self    Best call back number: [phone]    What test was performed: STD     When was the test performed: 10-20-21    Where was the test performed: IN OFFICE     Additional notes: PLEASE CALL WITH RESULTS   PATIENT IS CONCERNED   SHE SAW SOME RESULTS ON MY CHART BUT WANTS TO DISCUSS THIS WITH CLINICAL

## 2021-10-30 DIAGNOSIS — A04.8 BACTERIAL INFECTION DUE TO H. PYLORI: Primary | ICD-10-CM

## 2021-10-30 RX ORDER — OMEPRAZOLE 20 MG/1
20 CAPSULE, DELAYED RELEASE ORAL 2 TIMES DAILY
Qty: 28 CAPSULE | Refills: 0 | Status: SHIPPED | OUTPATIENT
Start: 2021-10-30 | End: 2021-11-13

## 2021-10-30 RX ORDER — LEVOFLOXACIN 500 MG/1
500 TABLET, FILM COATED ORAL DAILY
Qty: 14 TABLET | Refills: 0 | OUTPATIENT
Start: 2021-10-30 | End: 2021-12-02

## 2021-10-30 RX ORDER — AMOXICILLIN 500 MG/1
1000 CAPSULE ORAL 2 TIMES DAILY
Qty: 56 CAPSULE | Refills: 0 | Status: SHIPPED | OUTPATIENT
Start: 2021-10-30 | End: 2021-11-13

## 2021-11-03 ENCOUNTER — TELEPHONE (OUTPATIENT)
Dept: FAMILY MEDICINE CLINIC | Facility: CLINIC | Age: 21
End: 2021-11-03

## 2021-11-03 NOTE — TELEPHONE ENCOUNTER
Caller: Alaina Kennedy    Relationship: Self    Best call back number: [phone]    What is the best time to reach you: ANYTIME    Who are you requesting to speak with (clinical staff, provider,  specific staff member): ILA HAMMOND OR NURSE         What was the call regarding: THE PATIENT WOULD LIKE A CALL BACK TO DISCUSS HER MEDICATIONS SHE WOULD LIKE TO KNOW IF IS IT SAFE FOR HER TO TAKE ALL OF HER MEDICATIONS TOGETHER. THE PATIENT WOULD ALSO LIKE TO KNOW IF HER STI TEST RESULTS ARE IN     Do you require a callback: YES

## 2021-11-08 ENCOUNTER — TELEPHONE (OUTPATIENT)
Dept: FAMILY MEDICINE CLINIC | Facility: CLINIC | Age: 21
End: 2021-11-08

## 2021-11-08 NOTE — TELEPHONE ENCOUNTER
Caller: Alaina Kennedy    Relationship: Self    Best call back number: [phone]     What medications are you currently taking:   Current Outpatient Medications on File Prior to Visit   Medication Sig Dispense Refill   • amoxicillin (AMOXIL) 500 MG capsule Take 2 capsules by mouth 2 (Two) Times a Day for 14 days. 56 capsule 0   • clindamycin (Cleocin-T) 1 % external solution Apply  topically to the appropriate area as directed 2 (Two) Times a Day. 1 each 3   • fluconazole (Diflucan) 150 MG tablet Take 1 tablet day 1 po then 1 tablet day 3 if still symptomatic. 2 tablet 0   • levoFLOXacin (Levaquin) 500 MG tablet Take 1 tablet by mouth Daily. 14 tablet 0   • metroNIDAZOLE (Flagyl) 500 MG tablet Take 1 tablet by mouth 2 (Two) Times a Day. 14 tablet 0   • omeprazole (priLOSEC) 20 MG capsule Take 1 capsule by mouth 2 (Two) Times a Day for 14 days. 28 capsule 0   • tretinoin (RETIN-A) 0.1 % cream Apply 1 application topically to the appropriate area as directed Every Night. 45 g 5     No current facility-administered medications on file prior to visit.          When did you start taking these medications: 11-2-21    Which medication are you concerned about: metroNIDAZOLE (Flagyl) 500 MG tablet, levoFLOXacin (Levaquin) 500 MG tablet, amoxicillin (AMOXIL) 500 MG capsule    Who prescribed you this medication: ILA HAMMOND    What are your concerns: PATIENT STATED THAT SHE IS HAVING DIARRHEA, DIZZINESS AND STOMACH PAIN    How long have you had these concerns: LESS THAN A WEEK

## 2021-11-12 ENCOUNTER — OFFICE VISIT (OUTPATIENT)
Dept: FAMILY MEDICINE CLINIC | Facility: CLINIC | Age: 21
End: 2021-11-12

## 2021-11-12 VITALS
WEIGHT: 147.6 LBS | BODY MASS INDEX: 27.16 KG/M2 | DIASTOLIC BLOOD PRESSURE: 62 MMHG | HEART RATE: 65 BPM | OXYGEN SATURATION: 99 % | HEIGHT: 62 IN | SYSTOLIC BLOOD PRESSURE: 108 MMHG | TEMPERATURE: 97.8 F | RESPIRATION RATE: 16 BRPM

## 2021-11-12 DIAGNOSIS — A04.8 BACTERIAL INFECTION DUE TO H. PYLORI: ICD-10-CM

## 2021-11-12 DIAGNOSIS — R11.0 NAUSEA: Primary | ICD-10-CM

## 2021-11-12 DIAGNOSIS — L70.0 ACNE VULGARIS: ICD-10-CM

## 2021-11-12 PROCEDURE — 99213 OFFICE O/P EST LOW 20 MIN: CPT | Performed by: NURSE PRACTITIONER

## 2021-11-12 RX ORDER — CLINDAMYCIN PHOSPHATE 11.9 MG/ML
SOLUTION TOPICAL 2 TIMES DAILY
Qty: 1 EACH | Refills: 3 | Status: SHIPPED | OUTPATIENT
Start: 2021-11-12 | End: 2022-04-08 | Stop reason: SDUPTHER

## 2021-11-12 RX ORDER — ONDANSETRON 4 MG/1
4 TABLET, FILM COATED ORAL EVERY 8 HOURS PRN
Qty: 21 TABLET | Refills: 0 | Status: SHIPPED | OUTPATIENT
Start: 2021-11-12 | End: 2022-04-11

## 2021-11-12 NOTE — PROGRESS NOTES
"Chief Complaint  Follow-up    Subjective          Alaina Kennedy presents to [facility]  Patient has 1 day left of Flagyl to take and then she will get started on treatment for H.Pylori. States she had tried to take Flagyl and H.Pylori treatments together. Explains this made her nauseous and then received message from office to start H. Pylori regimen after Flagyl is finished. Requesting Zofran for intermittent nausea. Admits frequent abdominal bloating. Denies abdominal pain at this time. Denies fever or chills.       Objective   Vital Signs:   /62   Pulse 65   Temp 97.8 °F (36.6 °C)   Resp 16   Ht 157.5 cm (62\")   Wt 67 kg (147 lb 9.6 oz)   SpO2 99%   BMI 27.00 kg/m²     Physical Exam  Vitals and nursing note reviewed.   Constitutional:       General: She is not in acute distress.     Appearance: Normal appearance.   HENT:      Head: Normocephalic.      Right Ear: External ear normal.      Left Ear: External ear normal.      Nose: Nose normal.   Eyes:      Extraocular Movements: Extraocular movements intact.      Conjunctiva/sclera: Conjunctivae normal.      Pupils: Pupils are equal, round, and reactive to light.   Cardiovascular:      Rate and Rhythm: Regular rhythm.      Heart sounds: Normal heart sounds.   Pulmonary:      Effort: Pulmonary effort is normal.      Breath sounds: Normal breath sounds.   Abdominal:      General: Bowel sounds are normal. There is no distension.      Palpations: Abdomen is soft.      Tenderness: There is no abdominal tenderness. There is no guarding.   Musculoskeletal:      Right lower leg: No edema.      Left lower leg: No edema.   Skin:     General: Skin is warm and dry.   Neurological:      Mental Status: She is alert and oriented to person, place, and time.   Psychiatric:         Mood and Affect: Mood normal.         Behavior: Behavior normal.         Thought Content: Thought content normal.         Judgment: Judgment normal.      "   Result Review :     Common labs    Common Labsle 3/21/21 8/3/21 8/3/21 9/1/21 9/1/21     1046 1046 0927 0927   Glucose   77  100 (A)   BUN   16  14   Creatinine   0.99  0.82   eGFR African Am   87  108   Sodium   139  137   Potassium   4.9  4.0   Chloride   106  103   Calcium   9.4  9.3   Albumin   3.90  4.10   Total Bilirubin   0.2  0.3   Alkaline Phosphatase   102  108   AST (SGOT)   24  22   ALT (SGPT)   22  21   WBC 13.98 (A) 9.03  11.63 (A)    Hemoglobin 11.7 (A) 13.5  13.4    Hematocrit 36.4 40.1  40.5    Platelets 216 298  304    (A) Abnormal value                     Assessment and Plan    Diagnoses and all orders for this visit:    1. Nausea (Primary)  Assessment & Plan:  [place] diet  Eat before taking antibiotics  Zofran PRN    Orders:  -     ondansetron (Zofran) 4 MG tablet; Take 1 tablet by mouth Every 8 (Eight) Hours As Needed for Nausea or Vomiting.  Dispense: 21 tablet; Refill: 0    2. Acne vulgaris  Assessment & Plan:  Continue current treatment and refills provided    Orders:  -     clindamycin (Cleocin-T) 1 % external solution; Apply  topically to the appropriate area as directed 2 (Two) Times a Day.  Dispense: 1 each; Refill: 3    3. Bacterial infection due to H. pylori  Assessment & Plan:  Continue current treatment and return in 1 miranda for lab testing h.pylori. Informed to hold antacids and her Omeprazole at least 2 days before test.     Orders:  -     ondansetron (Zofran) 4 MG tablet; Take 1 tablet by mouth Every 8 (Eight) Hours As Needed for Nausea or Vomiting.  Dispense: 21 tablet; Refill: 0  -     H. Pylori Breath Test - Breath, Lung; Future      Follow Up   Return if symptoms worsen or fail to improve.  Patient was given instructions and counseling regarding her condition or for health maintenance advice. Please see specific information pulled into the AVS if appropriate.

## 2021-11-15 NOTE — ASSESSMENT & PLAN NOTE
Continue current treatment and return in 1 miranda for lab testing h.pylori. Informed to hold antacids and her Omeprazole at least 2 days before test.

## 2021-12-02 ENCOUNTER — APPOINTMENT (OUTPATIENT)
Dept: ULTRASOUND IMAGING | Facility: HOSPITAL | Age: 21
End: 2021-12-02

## 2021-12-02 ENCOUNTER — HOSPITAL ENCOUNTER (EMERGENCY)
Facility: HOSPITAL | Age: 21
Discharge: HOME | End: 2021-12-02
Attending: EMERGENCY MEDICINE | Admitting: EMERGENCY MEDICINE

## 2021-12-02 VITALS
HEART RATE: 68 BPM | TEMPERATURE: 98.6 F | RESPIRATION RATE: 16 BRPM | OXYGEN SATURATION: 100 % | WEIGHT: 146 LBS | HEIGHT: 62 IN | DIASTOLIC BLOOD PRESSURE: 57 MMHG | BODY MASS INDEX: 26.87 KG/M2 | SYSTOLIC BLOOD PRESSURE: 92 MMHG

## 2021-12-02 DIAGNOSIS — O20.0 THREATENED MISCARRIAGE: ICD-10-CM

## 2021-12-02 DIAGNOSIS — R10.9 ABDOMINAL PAIN DURING PREGNANCY IN FIRST TRIMESTER: ICD-10-CM

## 2021-12-02 DIAGNOSIS — Z3A.01 LESS THAN 8 WEEKS GESTATION OF PREGNANCY: Primary | ICD-10-CM

## 2021-12-02 DIAGNOSIS — U07.1 LAB TEST POSITIVE FOR DETECTION OF COVID-19 VIRUS: ICD-10-CM

## 2021-12-02 DIAGNOSIS — O26.891 ABDOMINAL PAIN DURING PREGNANCY IN FIRST TRIMESTER: ICD-10-CM

## 2021-12-02 LAB
ABO GROUP BLD: NORMAL
ALBUMIN SERPL-MCNC: 3.9 G/DL (ref 3.5–5.2)
ALBUMIN/GLOB SERPL: 1.2 G/DL
ALP SERPL-CCNC: 100 U/L (ref 39–117)
ALT SERPL W P-5'-P-CCNC: 16 U/L (ref 1–33)
ANION GAP SERPL CALCULATED.3IONS-SCNC: 9 MMOL/L (ref 5–15)
AST SERPL-CCNC: 20 U/L (ref 1–32)
B-HCG UR QL: POSITIVE
BASOPHILS # BLD AUTO: 0.03 10*3/MM3 (ref 0–0.2)
BASOPHILS NFR BLD AUTO: 0.4 % (ref 0–1.5)
BILIRUB SERPL-MCNC: 0.4 MG/DL (ref 0–1.2)
BILIRUB UR QL STRIP: NEGATIVE
BUN SERPL-MCNC: 11 MG/DL (ref 6–20)
BUN/CREAT SERPL: 14.7 (ref 7–25)
CALCIUM SPEC-SCNC: 9.1 MG/DL (ref 8.6–10.5)
CHLORIDE SERPL-SCNC: 106 MMOL/L (ref 98–107)
CLARITY UR: CLEAR
CO2 SERPL-SCNC: 23 MMOL/L (ref 22–29)
COLOR UR: YELLOW
CREAT SERPL-MCNC: 0.75 MG/DL (ref 0.57–1)
DEPRECATED RDW RBC AUTO: 44.3 FL (ref 37–54)
EOSINOPHIL # BLD AUTO: 0.24 10*3/MM3 (ref 0–0.4)
EOSINOPHIL NFR BLD AUTO: 3.2 % (ref 0.3–6.2)
ERYTHROCYTE [DISTWIDTH] IN BLOOD BY AUTOMATED COUNT: 13.5 % (ref 12.3–15.4)
EXPIRATION DATE: ABNORMAL
FLUAV RNA RESP QL NAA+PROBE: NOT DETECTED
FLUBV RNA RESP QL NAA+PROBE: NOT DETECTED
GFR SERPL CREATININE-BSD FRML MDRD: 118 ML/MIN/1.73
GLOBULIN UR ELPH-MCNC: 3.3 GM/DL
GLUCOSE SERPL-MCNC: 91 MG/DL (ref 65–99)
GLUCOSE UR STRIP-MCNC: NEGATIVE MG/DL
HCG INTACT+B SERPL-ACNC: 1284 MIU/ML
HCT VFR BLD AUTO: 38.5 % (ref 34–46.6)
HGB BLD-MCNC: 12.8 G/DL (ref 12–15.9)
HGB UR QL STRIP.AUTO: NEGATIVE
HOLD SPECIMEN: NORMAL
IMM GRANULOCYTES # BLD AUTO: 0.03 10*3/MM3 (ref 0–0.05)
IMM GRANULOCYTES NFR BLD AUTO: 0.4 % (ref 0–0.5)
INTERNAL NEGATIVE CONTROL: ABNORMAL
INTERNAL POSITIVE CONTROL: ABNORMAL
KETONES UR QL STRIP: ABNORMAL
LEUKOCYTE ESTERASE UR QL STRIP.AUTO: NEGATIVE
LIPASE SERPL-CCNC: 14 U/L (ref 13–60)
LYMPHOCYTES # BLD AUTO: 2.16 10*3/MM3 (ref 0.7–3.1)
LYMPHOCYTES NFR BLD AUTO: 28.9 % (ref 19.6–45.3)
Lab: ABNORMAL
MCH RBC QN AUTO: 29.7 PG (ref 26.6–33)
MCHC RBC AUTO-ENTMCNC: 33.2 G/DL (ref 31.5–35.7)
MCV RBC AUTO: 89.3 FL (ref 79–97)
MONOCYTES # BLD AUTO: 0.43 10*3/MM3 (ref 0.1–0.9)
MONOCYTES NFR BLD AUTO: 5.8 % (ref 5–12)
NEUTROPHILS NFR BLD AUTO: 4.58 10*3/MM3 (ref 1.7–7)
NEUTROPHILS NFR BLD AUTO: 61.3 % (ref 42.7–76)
NITRITE UR QL STRIP: NEGATIVE
NRBC BLD AUTO-RTO: 0 /100 WBC (ref 0–0.2)
NUMBER OF DOSES: NORMAL
PH UR STRIP.AUTO: 6 [PH] (ref 5–8)
PLATELET # BLD AUTO: 279 10*3/MM3 (ref 140–450)
PMV BLD AUTO: 10.4 FL (ref 6–12)
POTASSIUM SERPL-SCNC: 3.7 MMOL/L (ref 3.5–5.2)
PROGEST SERPL-MCNC: 19 NG/ML
PROT SERPL-MCNC: 7.2 G/DL (ref 6–8.5)
PROT UR QL STRIP: NEGATIVE
RBC # BLD AUTO: 4.31 10*6/MM3 (ref 3.77–5.28)
RH BLD: POSITIVE
SARS-COV-2 RNA RESP QL NAA+PROBE: DETECTED
SODIUM SERPL-SCNC: 138 MMOL/L (ref 136–145)
SP GR UR STRIP: 1.02 (ref 1–1.03)
UROBILINOGEN UR QL STRIP: ABNORMAL
WBC NRBC COR # BLD: 7.47 10*3/MM3 (ref 3.4–10.8)
WHOLE BLOOD HOLD SPECIMEN: NORMAL
WHOLE BLOOD HOLD SPECIMEN: NORMAL

## 2021-12-02 PROCEDURE — 81003 URINALYSIS AUTO W/O SCOPE: CPT | Performed by: EMERGENCY MEDICINE

## 2021-12-02 PROCEDURE — 86901 BLOOD TYPING SEROLOGIC RH(D): CPT | Performed by: NURSE PRACTITIONER

## 2021-12-02 PROCEDURE — 80053 COMPREHEN METABOLIC PANEL: CPT

## 2021-12-02 PROCEDURE — 84702 CHORIONIC GONADOTROPIN TEST: CPT | Performed by: NURSE PRACTITIONER

## 2021-12-02 PROCEDURE — 76817 TRANSVAGINAL US OBSTETRIC: CPT

## 2021-12-02 PROCEDURE — 81025 URINE PREGNANCY TEST: CPT | Performed by: EMERGENCY MEDICINE

## 2021-12-02 PROCEDURE — 85025 COMPLETE CBC W/AUTO DIFF WBC: CPT

## 2021-12-02 PROCEDURE — 25010000002 ONDANSETRON PER 1 MG: Performed by: NURSE PRACTITIONER

## 2021-12-02 PROCEDURE — 87636 SARSCOV2 & INF A&B AMP PRB: CPT | Performed by: NURSE PRACTITIONER

## 2021-12-02 PROCEDURE — 86900 BLOOD TYPING SEROLOGIC ABO: CPT | Performed by: NURSE PRACTITIONER

## 2021-12-02 PROCEDURE — 84144 ASSAY OF PROGESTERONE: CPT | Performed by: NURSE PRACTITIONER

## 2021-12-02 PROCEDURE — 83690 ASSAY OF LIPASE: CPT

## 2021-12-02 PROCEDURE — 96374 THER/PROPH/DIAG INJ IV PUSH: CPT

## 2021-12-02 PROCEDURE — 99284 EMERGENCY DEPT VISIT MOD MDM: CPT

## 2021-12-02 RX ORDER — SODIUM CHLORIDE 9 MG/ML
10 INJECTION INTRAVENOUS AS NEEDED
Status: DISCONTINUED | OUTPATIENT
Start: 2021-12-02 | End: 2021-12-02 | Stop reason: HOSPADM

## 2021-12-02 RX ORDER — AMOXICILLIN 125 MG/5ML
POWDER, FOR SUSPENSION ORAL 3 TIMES DAILY
COMMUNITY
End: 2022-02-25

## 2021-12-02 RX ORDER — ONDANSETRON 2 MG/ML
4 INJECTION INTRAMUSCULAR; INTRAVENOUS ONCE
Status: COMPLETED | OUTPATIENT
Start: 2021-12-02 | End: 2021-12-02

## 2021-12-02 RX ADMIN — ONDANSETRON 4 MG: 2 INJECTION INTRAMUSCULAR; INTRAVENOUS at 09:58

## 2021-12-02 RX ADMIN — SODIUM CHLORIDE 1000 ML: 9 INJECTION, SOLUTION INTRAVENOUS at 09:58

## 2021-12-02 NOTE — ED PROVIDER NOTES
Subjective   Diffuse lower abd cramping with nausea. Just found out today she was pregantn. A0. No vomiting or diarrhea. No fever. No cp. No soa. No vaginal bleeding.      Abdominal Pain  Pain location:  Suprapubic  Pain quality: aching and cramping    Pain radiates to:  Does not radiate  Pain severity:  Mild  Duration:  3 days  Timing:  Intermittent  Progression:  Waxing and waning  Chronicity:  New  Relieved by:  Nothing  Worsened by:  Nothing  Associated symptoms: nausea    Associated symptoms: no chest pain, no chills, no constipation, no cough, no diarrhea, no dysuria, no fever, no hematuria, no shortness of breath, no sore throat, no vaginal bleeding, no vaginal discharge and no vomiting        Review of Systems   Constitutional: Negative for chills, diaphoresis and fever.   HENT: Negative for congestion and sore throat.    Respiratory: Negative for cough, choking, chest tightness, shortness of breath and wheezing.    Cardiovascular: Negative for chest pain and leg swelling.   Gastrointestinal: Positive for abdominal pain and nausea. Negative for abdominal distention, anal bleeding, blood in stool, constipation, diarrhea and vomiting.   Genitourinary: Negative for difficulty urinating, dysuria, flank pain, frequency, hematuria, urgency, vaginal bleeding and vaginal discharge.   All other systems reviewed and are negative.      Past Medical History:   Diagnosis Date   • Chlamydia     a year ago, treated   • Gonorrhea     a year ago, treated   • H. pylori infection        No Known Allergies    Past Surgical History:   Procedure Laterality Date   • WISDOM TOOTH EXTRACTION         Family History   Problem Relation Age of Onset   • Diabetes Maternal Grandfather        Social History     Socioeconomic History   • Marital status: Single   Tobacco Use   • Smoking status: Never Smoker   • Smokeless tobacco: Never Used   Vaping Use   • Vaping Use: Never used   Substance and Sexual Activity   • Alcohol use: Yes      Comment: socially    • Drug use: Not Currently     Types: Marijuana     Comment: last use July 2020   • Sexual activity: Yes     Partners: Female, Male     Birth control/protection: I.U.D.           Objective   Physical Exam  Constitutional:       Appearance: She is well-developed.   HENT:      Head: Normocephalic and atraumatic.      Right Ear: External ear normal.      Left Ear: External ear normal.      Nose: Nose normal.   Eyes:      Conjunctiva/sclera: Conjunctivae normal.      Pupils: Pupils are equal, round, and reactive to light.   Cardiovascular:      Rate and Rhythm: Normal rate and regular rhythm.      Heart sounds: Normal heart sounds.   Pulmonary:      Effort: Pulmonary effort is normal.      Breath sounds: Normal breath sounds.   Abdominal:      General: Bowel sounds are normal.      Palpations: Abdomen is soft.   Musculoskeletal:         General: Normal range of motion.      Cervical back: Normal range of motion and neck supple.   Skin:     General: Skin is warm and dry.   Neurological:      Mental Status: She is alert and oriented to person, place, and time.   Psychiatric:         Behavior: Behavior normal.         Thought Content: Thought content normal.         Judgment: Judgment normal.         Procedures           ED Course  ED Course as of 12/02/21 1426   Thu Dec 02, 2021   1415 Patient reports she had Covid back in September and this is most likely residual.  She does not know her OB/GYN at [facility].  I reviewed her old records and I see where she had a delivery by Carolyne Huynh nurse midwife.  I have gave her paged.  Currently patient is resting comfortably denies any pain and actually is ready to go home.  I did discuss with the findings and the ultrasound.  I would like to discuss this with her OB/GYN first. [JM]   5951 I spoke to Sanma Huynh certified nurse midwife she recommends getting her progesterone level today as well as 1 in 2 days along with a repeat beta-hCG and  follow-up with them in the office on Monday.  Patient aware.  Return to the ER for any abdominal pain bleeding etc.  As of right now patient feels better no abdominal pain no vaginal bleeding.  Well aware the signs of worse condition.  All thankful and agreeable. [JM]      ED Course User Index  [JM] Jose Kaminski APRN           US Ob Transvaginal   Preliminary Result   1. Normal sized uterus, with very small gestational sac, estimated at 5   weeks by diameter with surrounding echogenic material, most likely   representing hemorrhage in the endometrial cavity. Consider follow-up by   obstetrics.   2. Questionable 2 cm right corpus luteum cyst.   3. Otherwise normal-appearing ovaries.           D:  12/02/2021   E:  12/02/2021                                                  MDM    Final diagnoses:   Less than 8 weeks gestation of pregnancy   Abdominal pain during pregnancy in first trimester   Lab test positive for detection of COVID-19 virus   Threatened miscarriage       ED Disposition  ED Disposition     ED Disposition Condition Comment    Discharge Stable           Queenie Huynh, IJEOMAM  [address] [address]  [phone]    On 12/6/2021  I spoke to Dr. Huynh and she recommends getting a repeat beta-hCG and progesterone level 2 days from now which I have already ordered. See them in the office on Monday.         Medication List      Stop    levoFLOXacin 500 MG tablet  Commonly known as: Jose Hall APRN  12/02/21 1719

## 2022-01-08 ENCOUNTER — OFFICE VISIT (OUTPATIENT)
Dept: FAMILY MEDICINE CLINIC | Facility: CLINIC | Age: 22
End: 2022-01-08

## 2022-01-08 VITALS
SYSTOLIC BLOOD PRESSURE: 108 MMHG | RESPIRATION RATE: 16 BRPM | TEMPERATURE: 98 F | WEIGHT: 150 LBS | BODY MASS INDEX: 27.6 KG/M2 | HEIGHT: 62 IN | HEART RATE: 78 BPM | OXYGEN SATURATION: 100 % | DIASTOLIC BLOOD PRESSURE: 66 MMHG

## 2022-01-08 DIAGNOSIS — A04.8 BACTERIAL INFECTION DUE TO H. PYLORI: Primary | ICD-10-CM

## 2022-01-08 DIAGNOSIS — R11.0 NAUSEA: ICD-10-CM

## 2022-01-08 DIAGNOSIS — Z00.00 ANNUAL PHYSICAL EXAM: ICD-10-CM

## 2022-01-08 DIAGNOSIS — Z32.00 ENCOUNTER FOR PREGNANCY TEST, RESULT UNKNOWN: ICD-10-CM

## 2022-01-08 LAB
B-HCG UR QL: NEGATIVE
EXPIRATION DATE: NORMAL
INTERNAL NEGATIVE CONTROL: NORMAL
INTERNAL POSITIVE CONTROL: NORMAL
Lab: NORMAL

## 2022-01-08 PROCEDURE — 81025 URINE PREGNANCY TEST: CPT | Performed by: NURSE PRACTITIONER

## 2022-01-08 PROCEDURE — 99214 OFFICE O/P EST MOD 30 MIN: CPT | Performed by: NURSE PRACTITIONER

## 2022-01-08 RX ORDER — NITROFURANTOIN 25; 75 MG/1; MG/1
CAPSULE ORAL
COMMUNITY
Start: 2021-12-01 | End: 2022-04-11

## 2022-01-08 NOTE — PROGRESS NOTES
"  Follow Up Office Visit      Patient Name: Alaina Kennedy  : 2000   MRN: [id number]   Care Team: Patient Care Team:  Mayda Roth APRN as PCP - General (Nurse Practitioner)    Chief Complaint:    Chief Complaint   Patient presents with   • GI Problem     H.pylori F/U   • Possible Pregnancy       Patient presents for follow up H.Pylori and states she feels much better.       Subjective      Review of Systems:   Review of Systems    I have reviewed and the following portions of the patient's history were updated as appropriate: past family history, past medical history, past social history, past surgical history and problem list.    Medications:     Current Outpatient Medications:   •  fluconazole (Diflucan) 150 MG tablet, Take 1 tablet day 1 po then 1 tablet day 3 if still symptomatic., Disp: 2 tablet, Rfl: 0  •  ondansetron (Zofran) 4 MG tablet, Take 1 tablet by mouth Every 8 (Eight) Hours As Needed for Nausea or Vomiting., Disp: 21 tablet, Rfl: 0  •  tretinoin (RETIN-A) 0.1 % cream, Apply 1 application topically to the appropriate area as directed Every Night., Disp: 45 g, Rfl: 5  •  amoxicillin (AMOXIL) 125 MG/5ML suspension, Take  by mouth 3 (Three) Times a Day. Unknown dose, Disp: , Rfl:   •  clindamycin (Cleocin-T) 1 % external solution, Apply  topically to the appropriate area as directed 2 (Two) Times a Day., Disp: 1 each, Rfl: 3  •  metroNIDAZOLE (Flagyl) 500 MG tablet, Take 1 tablet by mouth 2 (Two) Times a Day., Disp: 14 tablet, Rfl: 0  •  nitrofurantoin, macrocrystal-monohydrate, (MACROBID) 100 MG capsule, , Disp: , Rfl:     Allergies:   No Known Allergies    Objective     Physical Exam  Vital Signs:   Vitals:    22 1005   BP: 108/66   Pulse: 78   Resp: 16   Temp: 98 °F (36.7 °C)   SpO2: 100%   Weight: 68 kg (150 lb)   Height: 157.5 cm (62\")     Body mass index is 27.44 kg/m².     Physical Exam  Vitals and nursing note reviewed.   Constitutional:       General: She is not in " acute distress.     Appearance: Normal appearance.   HENT:      Head: Normocephalic and atraumatic.      Right Ear: External ear normal.      Left Ear: External ear normal.      Nose: Nose normal.   Eyes:      Extraocular Movements: Extraocular movements intact.      Conjunctiva/sclera: Conjunctivae normal.      Pupils: Pupils are equal, round, and reactive to light.   Cardiovascular:      Rate and Rhythm: Normal rate and regular rhythm.      Heart sounds: Normal heart sounds. No murmur heard.  No gallop.    Pulmonary:      Effort: Pulmonary effort is normal.      Breath sounds: Normal breath sounds.   Abdominal:      General: Abdomen is flat. Bowel sounds are normal. There is no distension.      Palpations: Abdomen is soft.      Tenderness: There is no abdominal tenderness.   Musculoskeletal:         General: No swelling or tenderness.      Cervical back: Normal range of motion and neck supple.      Right lower leg: No edema.      Left lower leg: No edema.   Skin:     General: Skin is warm and dry.   Neurological:      Mental Status: She is alert and oriented to person, place, and time.   Psychiatric:         Mood and Affect: Mood normal.         Behavior: Behavior normal.         Thought Content: Thought content normal.         Judgment: Judgment normal.         Assessment / Plan      Assessment/Plan  Problems Addressed This Visit  Diagnoses and all orders for this visit:    1. Bacterial infection due to H. pylori (Primary)  Assessment & Plan:  Patient completed last treatment and last H. Pylori test negative    Orders:  -     H. Pylori Breath Test - Breath, Lung; Future  -     H. Pylori Breath Test - Breath, Lung  -     Cancel: H. Pylori Breath Test - Breath, Lung    2. Encounter for pregnancy test, result unknown  Assessment & Plan:  Pregnancy test in office negative  Ordered HCG lab testing    Orders:  -     POCT pregnancy, urine  -     hCG, Serum, Qualitative; Future  -     hCG, Serum, Qualitative    3. Annual  physical exam  -     Lipid panel  -     Hemoglobin A1c    4. Nausea  Assessment & Plan:  Urine pregnancy test in office and HCG lab ordered  Patient has Zofran already as needed for nausea  [place] diet    Orders:  -     Hemoglobin A1c      Plan of care reviewed with patient at the conclusion of today's visit. Education was provided regarding diagnosis and management.  Patient verbalizes understanding of and agreement with management plan.    Follow Up: Return if symptoms worsen or fail to improve.        SERA Santacruz  [facility] [place]

## 2022-01-10 ENCOUNTER — LAB (OUTPATIENT)
Dept: LAB | Facility: HOSPITAL | Age: 22
End: 2022-01-10

## 2022-01-10 LAB
CHOLEST SERPL-MCNC: 172 MG/DL (ref 0–200)
HCG SERPL QL: POSITIVE
HDLC SERPL-MCNC: 71 MG/DL (ref 40–60)
LDLC SERPL CALC-MCNC: 88 MG/DL (ref 0–100)
LDLC/HDLC SERPL: 1.23 {RATIO}
TRIGL SERPL-MCNC: 67 MG/DL (ref 0–150)
VLDLC SERPL-MCNC: 13 MG/DL (ref 5–40)

## 2022-01-10 PROCEDURE — 80061 LIPID PANEL: CPT | Performed by: NURSE PRACTITIONER

## 2022-01-10 PROCEDURE — 84703 CHORIONIC GONADOTROPIN ASSAY: CPT | Performed by: NURSE PRACTITIONER

## 2022-01-10 PROCEDURE — 83013 H PYLORI (C-13) BREATH: CPT | Performed by: NURSE PRACTITIONER

## 2022-01-10 PROCEDURE — 83036 HEMOGLOBIN GLYCOSYLATED A1C: CPT | Performed by: NURSE PRACTITIONER

## 2022-01-10 PROCEDURE — 36415 COLL VENOUS BLD VENIPUNCTURE: CPT | Performed by: NURSE PRACTITIONER

## 2022-01-11 LAB — HBA1C MFR BLD: 5.52 % (ref 4.8–5.6)

## 2022-01-12 LAB — UREA BREATH TEST QL: NEGATIVE

## 2022-01-14 ENCOUNTER — TELEPHONE (OUTPATIENT)
Dept: FAMILY MEDICINE CLINIC | Facility: CLINIC | Age: 22
End: 2022-01-14

## 2022-01-14 NOTE — TELEPHONE ENCOUNTER
Caller: Alaina Kennedy    Relationship: Self    Best call back number: [phone]    Caller requesting test results: LAB    What test was performed:    When was the test performed: 01/10/22    Where was the test performed: AT OFFICE    Additional notes:

## 2022-01-18 PROBLEM — Z32.00 ENCOUNTER FOR PREGNANCY TEST, RESULT UNKNOWN: Status: ACTIVE | Noted: 2022-01-18

## 2022-01-18 NOTE — ASSESSMENT & PLAN NOTE
Urine pregnancy test in office and HCG lab ordered  Patient has Zofran already as needed for nausea  [place] diet

## 2022-02-25 ENCOUNTER — OFFICE VISIT (OUTPATIENT)
Dept: FAMILY MEDICINE CLINIC | Facility: CLINIC | Age: 22
End: 2022-02-25

## 2022-02-25 VITALS
HEART RATE: 72 BPM | OXYGEN SATURATION: 99 % | TEMPERATURE: 98.7 F | RESPIRATION RATE: 18 BRPM | SYSTOLIC BLOOD PRESSURE: 100 MMHG | WEIGHT: 148 LBS | DIASTOLIC BLOOD PRESSURE: 80 MMHG | BODY MASS INDEX: 27.23 KG/M2 | HEIGHT: 62 IN

## 2022-02-25 DIAGNOSIS — Z20.2 POSSIBLE EXPOSURE TO STD: ICD-10-CM

## 2022-02-25 DIAGNOSIS — R51.9 UNILATERAL OCCIPITAL HEADACHE: Primary | ICD-10-CM

## 2022-02-25 DIAGNOSIS — N89.8 VAGINAL DISCHARGE: ICD-10-CM

## 2022-02-25 PROCEDURE — 99214 OFFICE O/P EST MOD 30 MIN: CPT | Performed by: NURSE PRACTITIONER

## 2022-03-07 ENCOUNTER — TELEPHONE (OUTPATIENT)
Dept: FAMILY MEDICINE CLINIC | Facility: CLINIC | Age: 22
End: 2022-03-07

## 2022-03-07 NOTE — TELEPHONE ENCOUNTER
Caller: Alaina Kennedy    Relationship: Self    Best call back number: [phone]    Caller requesting test results: PATIENT    What test was performed: STD TESTS    When was the test performed: 2/25/2022    Where was the test performed: IN OFFICE    Additional notes: PATIENT HAD ASKED FOR STD TESTING DURING HER APPOINTMENT AND CANNOT FIND THOSE RESULTS IN [facility]. PLEASE CONTACT PATIENT TO VERIFY THESE TESTS WERE PERFORMED AND DISCUSS ANY RESULTS THAT HAVE RETURNED.

## 2022-03-11 ENCOUNTER — APPOINTMENT (OUTPATIENT)
Dept: CT IMAGING | Facility: HOSPITAL | Age: 22
End: 2022-03-11

## 2022-03-16 PROBLEM — R51.9 UNILATERAL OCCIPITAL HEADACHE: Status: ACTIVE | Noted: 2022-03-16

## 2022-03-24 ENCOUNTER — HOSPITAL ENCOUNTER (OUTPATIENT)
Dept: CT IMAGING | Facility: HOSPITAL | Age: 22
Discharge: HOME | End: 2022-03-24
Admitting: NURSE PRACTITIONER

## 2022-03-24 DIAGNOSIS — R51.9 UNILATERAL OCCIPITAL HEADACHE: ICD-10-CM

## 2022-03-24 PROCEDURE — 70450 CT HEAD/BRAIN W/O DYE: CPT

## 2022-04-08 DIAGNOSIS — L70.0 ACNE VULGARIS: ICD-10-CM

## 2022-04-08 RX ORDER — CLINDAMYCIN PHOSPHATE 11.9 MG/ML
SOLUTION TOPICAL 2 TIMES DAILY
Qty: 1 EACH | Refills: 3 | Status: SHIPPED | OUTPATIENT
Start: 2022-04-08 | End: 2022-04-11

## 2022-04-11 ENCOUNTER — LAB (OUTPATIENT)
Dept: LAB | Facility: HOSPITAL | Age: 22
End: 2022-04-11

## 2022-04-11 ENCOUNTER — TRANSCRIBE ORDERS (OUTPATIENT)
Dept: LAB | Facility: HOSPITAL | Age: 22
End: 2022-04-11

## 2022-04-11 ENCOUNTER — OFFICE VISIT (OUTPATIENT)
Dept: FAMILY MEDICINE CLINIC | Facility: CLINIC | Age: 22
End: 2022-04-11

## 2022-04-11 VITALS
SYSTOLIC BLOOD PRESSURE: 108 MMHG | HEART RATE: 86 BPM | WEIGHT: 146 LBS | BODY MASS INDEX: 26.87 KG/M2 | HEIGHT: 62 IN | OXYGEN SATURATION: 99 % | TEMPERATURE: 98.6 F | DIASTOLIC BLOOD PRESSURE: 72 MMHG | RESPIRATION RATE: 18 BRPM

## 2022-04-11 DIAGNOSIS — F32.0 MAJOR DEPRESSIVE DISORDER, SINGLE EPISODE, MILD: Primary | ICD-10-CM

## 2022-04-11 DIAGNOSIS — N89.8 VAGINAL DISCHARGE: ICD-10-CM

## 2022-04-11 DIAGNOSIS — Z20.2 POSSIBLE EXPOSURE TO STD: Primary | ICD-10-CM

## 2022-04-11 DIAGNOSIS — F32.0 MAJOR DEPRESSIVE DISORDER, SINGLE EPISODE, MILD: ICD-10-CM

## 2022-04-11 LAB
ALBUMIN SERPL-MCNC: 4.2 G/DL (ref 3.5–5.2)
ALBUMIN/GLOB SERPL: 1.3 G/DL
ALP SERPL-CCNC: 106 U/L (ref 39–117)
ALT SERPL W P-5'-P-CCNC: 16 U/L (ref 1–33)
ANION GAP SERPL CALCULATED.3IONS-SCNC: 9.6 MMOL/L (ref 5–15)
AST SERPL-CCNC: 20 U/L (ref 1–32)
BILIRUB SERPL-MCNC: 0.2 MG/DL (ref 0–1.2)
BUN SERPL-MCNC: 12 MG/DL (ref 6–20)
BUN/CREAT SERPL: 16.9 (ref 7–25)
CALCIUM SPEC-SCNC: 9.3 MG/DL (ref 8.6–10.5)
CHLORIDE SERPL-SCNC: 104 MMOL/L (ref 98–107)
CHOLEST SERPL-MCNC: 155 MG/DL (ref 0–200)
CO2 SERPL-SCNC: 25.4 MMOL/L (ref 22–29)
CREAT SERPL-MCNC: 0.71 MG/DL (ref 0.57–1)
EGFRCR SERPLBLD CKD-EPI 2021: 124.2 ML/MIN/1.73
GLOBULIN UR ELPH-MCNC: 3.2 GM/DL
GLUCOSE SERPL-MCNC: 88 MG/DL (ref 65–99)
HBA1C MFR BLD: 4.8 % (ref 4.8–5.6)
HDLC SERPL-MCNC: 65 MG/DL (ref 40–60)
LDLC SERPL CALC-MCNC: 81 MG/DL (ref 0–100)
LDLC/HDLC SERPL: 1.27 {RATIO}
POTASSIUM SERPL-SCNC: 4 MMOL/L (ref 3.5–5.2)
PROT SERPL-MCNC: 7.4 G/DL (ref 6–8.5)
SODIUM SERPL-SCNC: 139 MMOL/L (ref 136–145)
T4 FREE SERPL-MCNC: 1.32 NG/DL (ref 0.93–1.7)
TRIGL SERPL-MCNC: 37 MG/DL (ref 0–150)
TSH SERPL DL<=0.05 MIU/L-ACNC: 1.41 UIU/ML (ref 0.27–4.2)
VLDLC SERPL-MCNC: 9 MG/DL (ref 5–40)

## 2022-04-11 PROCEDURE — 84443 ASSAY THYROID STIM HORMONE: CPT

## 2022-04-11 PROCEDURE — 80061 LIPID PANEL: CPT

## 2022-04-11 PROCEDURE — 80053 COMPREHEN METABOLIC PANEL: CPT

## 2022-04-11 PROCEDURE — 36415 COLL VENOUS BLD VENIPUNCTURE: CPT

## 2022-04-11 PROCEDURE — 84439 ASSAY OF FREE THYROXINE: CPT

## 2022-04-11 PROCEDURE — 83036 HEMOGLOBIN GLYCOSYLATED A1C: CPT

## 2022-04-11 PROCEDURE — 99213 OFFICE O/P EST LOW 20 MIN: CPT | Performed by: NURSE PRACTITIONER

## 2022-04-11 PROCEDURE — 82306 VITAMIN D 25 HYDROXY: CPT

## 2022-04-11 NOTE — PROGRESS NOTES
"Chief Complaint  Exposure to STD and bloodwork    Subjective          Alaina Kennedy presents to [facility]  Patient presents to office requesting STI testing due to possible exposure and requesting treatment for Bacterial Vaginosis due to current symptoms. States she had BV in past and having same discharge with foul odor as in past. Denies fever or chills. Denies vaginal lesions or rash.      Objective   Vital Signs:   /72   Pulse 86   Temp 98.6 °F (37 °C)   Resp 18   Ht 157.5 cm (62\")   Wt 66.2 kg (146 lb)   SpO2 99%   BMI 26.70 kg/m²            Physical Exam  Vitals and nursing note reviewed.   Constitutional:       General: She is not in acute distress.     Appearance: Normal appearance.   HENT:      Head: Normocephalic and atraumatic.      Right Ear: External ear normal.      Left Ear: External ear normal.      Nose: Nose normal.   Eyes:      Extraocular Movements: Extraocular movements intact.      Conjunctiva/sclera: Conjunctivae normal.      Pupils: Pupils are equal, round, and reactive to light.   Cardiovascular:      Rate and Rhythm: Normal rate and regular rhythm.      Heart sounds: Normal heart sounds.   Pulmonary:      Effort: Pulmonary effort is normal.      Breath sounds: Normal breath sounds.   Musculoskeletal:      Right lower leg: No edema.      Left lower leg: No edema.   Skin:     General: Skin is warm and dry.      Findings: No rash.   Neurological:      Mental Status: She is alert and oriented to person, place, and time.   Psychiatric:         Mood and Affect: Mood normal.         Behavior: Behavior normal.         Thought Content: Thought content normal.         Judgment: Judgment normal.        Result Review :                 Assessment and Plan    Diagnoses and all orders for this visit:    1. Possible exposure to STD (Primary)  Assessment & Plan:  Testing performed per patient request and start Flagyl due to suspected BV due to " symptoms    Orders:  -     OneSwab - Kit, Vagina    2. Vaginal discharge  -     OneSwab - Kit, Vagina    Other orders  -     SCANNED - LABS      Follow Up   Return if symptoms worsen or fail to improve.  Patient was given instructions and counseling regarding her condition or for health maintenance advice. Please see specific information pulled into the AVS if appropriate.

## 2022-04-12 LAB — 25(OH)D3 SERPL-MCNC: 14.1 NG/ML (ref 30–100)

## 2022-04-14 ENCOUNTER — TELEPHONE (OUTPATIENT)
Dept: FAMILY MEDICINE CLINIC | Facility: CLINIC | Age: 22
End: 2022-04-14

## 2022-04-14 NOTE — TELEPHONE ENCOUNTER
Caller: Alaina Kennedy    Relationship: Self    Best call back number: [phone]    What orders are you requesting (i.e. lab or imaging): CBC     In what timeframe would the patient need to come in: NA    Where will you receive your lab/imaging services: OFFICE    Additional notes: PATIENT WANTING TO KNOW IF HER CBC WAS TESTED, IF NOT PLEASE ORDER

## 2022-04-21 DIAGNOSIS — R11.0 NAUSEA: Primary | ICD-10-CM

## 2022-04-21 DIAGNOSIS — E55.9 VITAMIN D DEFICIENCY: Primary | ICD-10-CM

## 2022-04-21 DIAGNOSIS — E55.9 VITAMIN D INSUFFICIENCY: ICD-10-CM

## 2022-04-21 RX ORDER — ERGOCALCIFEROL 1.25 MG/1
50000 CAPSULE ORAL WEEKLY
Qty: 12 CAPSULE | Refills: 1 | Status: SHIPPED | OUTPATIENT
Start: 2022-04-21 | End: 2023-02-15

## 2023-02-10 ENCOUNTER — TELEPHONE (OUTPATIENT)
Dept: FAMILY MEDICINE CLINIC | Facility: CLINIC | Age: 23
End: 2023-02-10

## 2023-02-15 ENCOUNTER — OFFICE VISIT (OUTPATIENT)
Dept: FAMILY MEDICINE CLINIC | Facility: CLINIC | Age: 23
End: 2023-02-15
Payer: COMMERCIAL

## 2023-02-15 ENCOUNTER — LAB (OUTPATIENT)
Dept: LAB | Facility: HOSPITAL | Age: 23
End: 2023-02-15
Payer: COMMERCIAL

## 2023-02-15 VITALS
TEMPERATURE: 96.2 F | SYSTOLIC BLOOD PRESSURE: 104 MMHG | WEIGHT: 152.8 LBS | OXYGEN SATURATION: 99 % | RESPIRATION RATE: 15 BRPM | HEART RATE: 60 BPM | DIASTOLIC BLOOD PRESSURE: 70 MMHG | HEIGHT: 62 IN | BODY MASS INDEX: 28.12 KG/M2

## 2023-02-15 DIAGNOSIS — R10.9 STOMACH DISCOMFORT: ICD-10-CM

## 2023-02-15 DIAGNOSIS — E66.3 OVERWEIGHT: ICD-10-CM

## 2023-02-15 DIAGNOSIS — Z00.00 ANNUAL PHYSICAL EXAM: ICD-10-CM

## 2023-02-15 DIAGNOSIS — K21.9 GASTROESOPHAGEAL REFLUX DISEASE, UNSPECIFIED WHETHER ESOPHAGITIS PRESENT: ICD-10-CM

## 2023-02-15 DIAGNOSIS — Z00.00 ANNUAL PHYSICAL EXAM: Primary | ICD-10-CM

## 2023-02-15 LAB
DEPRECATED RDW RBC AUTO: 43.1 FL (ref 37–54)
ERYTHROCYTE [DISTWIDTH] IN BLOOD BY AUTOMATED COUNT: 12.9 % (ref 12.3–15.4)
HCT VFR BLD AUTO: 36.1 % (ref 34–46.6)
HGB BLD-MCNC: 12.1 G/DL (ref 12–15.9)
MCH RBC QN AUTO: 30.7 PG (ref 26.6–33)
MCHC RBC AUTO-ENTMCNC: 33.5 G/DL (ref 31.5–35.7)
MCV RBC AUTO: 91.6 FL (ref 79–97)
PLATELET # BLD AUTO: 253 10*3/MM3 (ref 140–450)
PMV BLD AUTO: 11 FL (ref 6–12)
RBC # BLD AUTO: 3.94 10*6/MM3 (ref 3.77–5.28)
WBC NRBC COR # BLD: 7.47 10*3/MM3 (ref 3.4–10.8)

## 2023-02-15 PROCEDURE — 86704 HEP B CORE ANTIBODY TOTAL: CPT

## 2023-02-15 PROCEDURE — 83690 ASSAY OF LIPASE: CPT

## 2023-02-15 PROCEDURE — 86706 HEP B SURFACE ANTIBODY: CPT

## 2023-02-15 PROCEDURE — 86231 EMA EACH IG CLASS: CPT

## 2023-02-15 PROCEDURE — 87340 HEPATITIS B SURFACE AG IA: CPT

## 2023-02-15 PROCEDURE — G0432 EIA HIV-1/HIV-2 SCREEN: HCPCS

## 2023-02-15 PROCEDURE — 86364 TISS TRNSGLTMNASE EA IG CLAS: CPT

## 2023-02-15 PROCEDURE — 82784 ASSAY IGA/IGD/IGG/IGM EACH: CPT

## 2023-02-15 PROCEDURE — 82306 VITAMIN D 25 HYDROXY: CPT

## 2023-02-15 PROCEDURE — 99395 PREV VISIT EST AGE 18-39: CPT | Performed by: STUDENT IN AN ORGANIZED HEALTH CARE EDUCATION/TRAINING PROGRAM

## 2023-02-15 PROCEDURE — 86803 HEPATITIS C AB TEST: CPT

## 2023-02-15 PROCEDURE — 80050 GENERAL HEALTH PANEL: CPT

## 2023-02-15 PROCEDURE — 2014F MENTAL STATUS ASSESS: CPT | Performed by: STUDENT IN AN ORGANIZED HEALTH CARE EDUCATION/TRAINING PROGRAM

## 2023-02-15 PROCEDURE — 84703 CHORIONIC GONADOTROPIN ASSAY: CPT

## 2023-02-15 PROCEDURE — 86592 SYPHILIS TEST NON-TREP QUAL: CPT

## 2023-02-15 PROCEDURE — 3008F BODY MASS INDEX DOCD: CPT | Performed by: STUDENT IN AN ORGANIZED HEALTH CARE EDUCATION/TRAINING PROGRAM

## 2023-02-15 PROCEDURE — 83013 H PYLORI (C-13) BREATH: CPT | Performed by: STUDENT IN AN ORGANIZED HEALTH CARE EDUCATION/TRAINING PROGRAM

## 2023-02-15 PROCEDURE — 36415 COLL VENOUS BLD VENIPUNCTURE: CPT

## 2023-02-15 PROCEDURE — 80061 LIPID PANEL: CPT

## 2023-02-15 PROCEDURE — 83036 HEMOGLOBIN GLYCOSYLATED A1C: CPT

## 2023-02-15 RX ORDER — FAMOTIDINE 20 MG/1
20 TABLET, FILM COATED ORAL DAILY
Qty: 30 TABLET | Refills: 1 | OUTPATIENT
Start: 2023-02-15 | End: 2023-02-15

## 2023-02-15 RX ORDER — ESCITALOPRAM OXALATE 20 MG/1
1 TABLET ORAL DAILY
COMMUNITY
Start: 2023-02-06 | End: 2023-03-22

## 2023-02-15 RX ORDER — BUPROPION HYDROCHLORIDE 150 MG/1
1 TABLET, EXTENDED RELEASE ORAL DAILY
COMMUNITY
Start: 2023-02-06 | End: 2023-03-09

## 2023-02-15 RX ORDER — FAMOTIDINE 20 MG/1
20 TABLET, FILM COATED ORAL DAILY
Qty: 30 TABLET | Refills: 1 | OUTPATIENT
Start: 2023-02-15 | End: 2023-03-22

## 2023-02-15 RX ORDER — FAMOTIDINE 20 MG/1
20 TABLET, FILM COATED ORAL 2 TIMES DAILY
Qty: 30 TABLET | Refills: 1 | Status: SHIPPED | OUTPATIENT
Start: 2023-02-15 | End: 2023-02-15

## 2023-02-15 NOTE — PROGRESS NOTES
"     New Patient Office Visit      Patient Name: Alaina Kennedy  : 2000   MRN: [id number]     Chief Complaint:  Annual Exam (Stomach issues, est care)     History of Present Illness:         Annual exam  Pap smear: she is agreeable. She will reschedule for this another day.   Counseled on diet and exercise including limited sweets and sugars to focus on lean meat and vegetables. Counseled on 50 minutes of moderate exercise 3 times per week.   Recommend dental and eye exam  hiv hep c/ std screening she is agreeable  covid vaccine : recommended   Flu vaccine: recommended   Gardisil: recommended. She is unsure if she had this.   Tdap: recommended     Stomach issue  She says she has \"always had an issue with her stomach\"  The pain did not happen today, but last night she had the pain   This pain lasts for about 30 minutes and is sharp. A heating pad and ibuprofen has helped.   The pain is sharp and goes to her back. This pain is in the center of her abdomen.   She has constipation  Reflux is present   No fever  No vomiting  No black/bloody stools   She says her diet is not good  No certain food makes it worse aside from fast food or carbonation   No unintentional weight loss, difficulty swallowing, OMAR, family history of upper gi cancer, new onset over 60 years old.  Periods are normal and not painful.   No urinary symptoms.         She takes welbutrin and lexapro from behavioral health provider. [facility].       Subjective        Past Medical History:   Diagnosis Date   • Anxiety    • Chlamydia     a year ago, treated   • Cholelithiasis    • Depression    • Gonorrhea     a year ago, treated   • H. pylori infection        Past Surgical History:   Procedure Laterality Date   • WISDOM TOOTH EXTRACTION         Family History   Problem Relation Age of Onset   • Diabetes Maternal Grandfather        Social History     Socioeconomic History   • Marital status: Single   Tobacco Use   • Smoking status: " "Never   • Smokeless tobacco: Never   Vaping Use   • Vaping Use: Never used   Substance and Sexual Activity   • Alcohol use: Yes     Comment: socially    • Drug use: Not Currently     Types: Marijuana     Comment: last use July 2020   • Sexual activity: Yes     Partners: Female, Male     Birth control/protection: None          Current Outpatient Medications:   •  buPROPion SR (WELLBUTRIN SR) 150 MG 12 hr tablet, Take 1 tablet by mouth Daily., Disp: , Rfl:   •  escitalopram (LEXAPRO) 20 MG tablet, Take 1 tablet by mouth Daily., Disp: , Rfl:   •  famotidine (Pepcid) 20 MG tablet, Take 1 tablet by mouth Daily., Disp: 30 tablet, Rfl: 1    No Known Allergies    Objective     Physical Exam:  Vitals:    02/15/23 1342   BP: 104/70   Pulse: 60   Resp: 15   Temp: 96.2 °F (35.7 °C)   SpO2: 99%   Weight: 69.3 kg (152 lb 12.8 oz)   Height: 157.5 cm (62\")   PainSc:   9      Body mass index is 27.95 kg/m².     Physical Exam  Constitutional:       General: She is not in acute distress.     Appearance: Normal appearance.   HENT:      Head: Normocephalic and atraumatic.   Eyes:      Extraocular Movements: Extraocular movements intact.   Cardiovascular:      Rate and Rhythm: Normal rate and regular rhythm.      Heart sounds: No murmur heard.  Pulmonary:      Effort: Pulmonary effort is normal. No respiratory distress.      Breath sounds: Normal breath sounds. No stridor. No wheezing, rhonchi or rales.   Abdominal:      General: Bowel sounds are normal. There is distension.      Palpations: Abdomen is soft.      Tenderness: There is no abdominal tenderness. There is no guarding or rebound.      Hernia: No hernia is present.      Comments: murphys negative   Skin:     Findings: No rash.   Neurological:      General: No focal deficit present.      Mental Status: She is alert.   Psychiatric:         Mood and Affect: Mood normal.              Assessment / Plan      Assessment/Plan:   Diagnoses and all orders for this visit:    1. Annual " physical exam (Primary)  Assessment & Plan:  Annual exam  Pap smear: she is agreeable. She will reschedule for this another day.   Counseled on diet and exercise including limited sweets and sugars to focus on lean meat and vegetables. Counseled on 50 minutes of moderate exercise 3 times per week.   Recommend dental and eye exam  hiv hep c/ std screening she is agreeable  covid vaccine : recommended   Flu vaccine: recommended   Gardisil: recommended. She is unsure if she had this.   Tdap: recommended     Orders:  -     Hemoglobin A1c; Future  -     Lipid Panel; Future  -     TSH Rfx On Abnormal To Free T4; Future  -     OneSwab - Kit, Vagina; Future  -     Hepatitis C Antibody; Future  -     HIV-1 / O / 2 Ag / Antibody 4th Generation; Future  -     RPR; Future  -     Hepatitis B Virus (HBV) Screening and Diagnosis; Future    2. Stomach discomfort  -     CBC (No Diff); Future  -     Comprehensive Metabolic Panel; Future  -     hCG, Serum, Qualitative; Future  -     Lipase; Future  -     US Abdomen Complete; Future  -     US Pelvis Complete; Future  -     Celiac Disease Panel; Future    3. Overweight  -     Vitamin D,25-Hydroxy; Future    4. Gastroesophageal reflux disease, unspecified whether esophagitis present  -     H. Pylori Breath Test - Breath, Lung    Other orders  -     Discontinue: famotidine (Pepcid) 20 MG tablet; Take 1 tablet by mouth 2 (Two) Times a Day.  Dispense: 30 tablet; Refill: 1  -     Discontinue: famotidine (Pepcid) 20 MG tablet; Take 1 tablet by mouth Daily.  Dispense: 30 tablet; Refill: 1  -     famotidine (Pepcid) 20 MG tablet; Take 1 tablet by mouth Daily.  Dispense: 30 tablet; Refill: 1       For stomach pain  Recheck h pylori.   Check labs per above.   Start on pepcid.  Start on metamucil with senna as needed.   Increase hydration and fiber intake.   Check US abdomen and pelvis to evaluate causes of diffuse belly pain.   Advised her to go to er for any worsening pain, fever, bloody/black  stool, vomiting.   Discussed with her we will consider egd in the future if symptoms of reflux are not controlled with medications.       We have discussed not eating within 3 hours of bed and avoidance of triggers such as spicy foods caffeine.        Return in about 3 weeks (around 3/8/2023).       Dianna Garcia D.O.  [facility]

## 2023-02-15 NOTE — ASSESSMENT & PLAN NOTE
Annual exam  Pap smear: she is agreeable. She will reschedule for this another day.   Counseled on diet and exercise including limited sweets and sugars to focus on lean meat and vegetables. Counseled on 50 minutes of moderate exercise 3 times per week.   Recommend dental and eye exam  hiv hep c/ std screening she is agreeable  covid vaccine : recommended   Flu vaccine: recommended   Gardisil: recommended. She is unsure if she had this.   Tdap: recommended

## 2023-02-16 LAB
25(OH)D3 SERPL-MCNC: 24 NG/ML (ref 30–100)
ALBUMIN SERPL-MCNC: 4.2 G/DL (ref 3.5–5.2)
ALBUMIN/GLOB SERPL: 1.3 G/DL
ALP SERPL-CCNC: 118 U/L (ref 39–117)
ALT SERPL W P-5'-P-CCNC: 17 U/L (ref 1–33)
ANION GAP SERPL CALCULATED.3IONS-SCNC: 7.9 MMOL/L (ref 5–15)
AST SERPL-CCNC: 24 U/L (ref 1–32)
BILIRUB SERPL-MCNC: <0.2 MG/DL (ref 0–1.2)
BUN SERPL-MCNC: 15 MG/DL (ref 6–20)
BUN/CREAT SERPL: 18.5 (ref 7–25)
CALCIUM SPEC-SCNC: 9.3 MG/DL (ref 8.6–10.5)
CHLORIDE SERPL-SCNC: 101 MMOL/L (ref 98–107)
CHOLEST SERPL-MCNC: 162 MG/DL (ref 0–200)
CO2 SERPL-SCNC: 28.1 MMOL/L (ref 22–29)
CREAT SERPL-MCNC: 0.81 MG/DL (ref 0.57–1)
EGFRCR SERPLBLD CKD-EPI 2021: 105.4 ML/MIN/1.73
GLOBULIN UR ELPH-MCNC: 3.2 GM/DL
GLUCOSE SERPL-MCNC: 89 MG/DL (ref 65–99)
HBA1C MFR BLD: 4.9 % (ref 4.8–5.6)
HCG SERPL QL: NEGATIVE
HCV AB SER DONR QL: NORMAL
HDLC SERPL-MCNC: 80 MG/DL (ref 40–60)
HIV1+2 AB SER QL: NORMAL
LDLC SERPL CALC-MCNC: 73 MG/DL (ref 0–100)
LDLC/HDLC SERPL: 0.93 {RATIO}
LIPASE SERPL-CCNC: 21 U/L (ref 13–60)
POTASSIUM SERPL-SCNC: 4.1 MMOL/L (ref 3.5–5.2)
PROT SERPL-MCNC: 7.4 G/DL (ref 6–8.5)
RPR SER QL: NORMAL
SODIUM SERPL-SCNC: 137 MMOL/L (ref 136–145)
TRIGL SERPL-MCNC: 39 MG/DL (ref 0–150)
TSH SERPL DL<=0.05 MIU/L-ACNC: 1.31 UIU/ML (ref 0.27–4.2)
VLDLC SERPL-MCNC: 9 MG/DL (ref 5–40)

## 2023-02-17 LAB
ENDOMYSIUM IGA SER QL: NEGATIVE
HBV CORE AB SERPL QL IA: NEGATIVE
HBV SURFACE AB SER QL: NON REACTIVE
HBV SURFACE AG SERPL QL IA: NEGATIVE
IGA SERPL-MCNC: 571 MG/DL (ref 87–352)
IMP & REVIEW OF LAB RESULTS: NORMAL
LABORATORY COMMENT REPORT: NORMAL
TTG IGA SER-ACNC: <2 U/ML (ref 0–3)
UREA BREATH TEST QL: NEGATIVE

## 2023-03-06 ENCOUNTER — HOSPITAL ENCOUNTER (OUTPATIENT)
Dept: ULTRASOUND IMAGING | Facility: HOSPITAL | Age: 23
Discharge: HOME | End: 2023-03-06
Payer: COMMERCIAL

## 2023-03-06 DIAGNOSIS — R10.9 STOMACH DISCOMFORT: ICD-10-CM

## 2023-03-06 PROCEDURE — 76700 US EXAM ABDOM COMPLETE: CPT

## 2023-03-06 PROCEDURE — 76830 TRANSVAGINAL US NON-OB: CPT

## 2023-03-09 ENCOUNTER — OFFICE VISIT (OUTPATIENT)
Dept: FAMILY MEDICINE CLINIC | Facility: CLINIC | Age: 23
End: 2023-03-09
Payer: COMMERCIAL

## 2023-03-09 VITALS
BODY MASS INDEX: 28.6 KG/M2 | WEIGHT: 155.4 LBS | TEMPERATURE: 97.5 F | OXYGEN SATURATION: 99 % | HEIGHT: 62 IN | HEART RATE: 92 BPM | SYSTOLIC BLOOD PRESSURE: 102 MMHG | DIASTOLIC BLOOD PRESSURE: 74 MMHG | RESPIRATION RATE: 16 BRPM

## 2023-03-09 DIAGNOSIS — K59.09 OTHER CONSTIPATION: Primary | ICD-10-CM

## 2023-03-09 PROBLEM — R51.9 UNILATERAL OCCIPITAL HEADACHE: Status: RESOLVED | Noted: 2022-03-16 | Resolved: 2023-03-09

## 2023-03-09 PROBLEM — Z20.2 STD EXPOSURE: Status: RESOLVED | Noted: 2021-07-24 | Resolved: 2023-03-09

## 2023-03-09 PROBLEM — R51.9 ACUTE INTRACTABLE HEADACHE: Status: RESOLVED | Noted: 2022-02-25 | Resolved: 2023-03-09

## 2023-03-09 PROBLEM — H61.23 BILATERAL IMPACTED CERUMEN: Status: RESOLVED | Noted: 2021-07-24 | Resolved: 2023-03-09

## 2023-03-09 PROBLEM — R11.0 NAUSEA: Status: RESOLVED | Noted: 2021-07-24 | Resolved: 2023-03-09

## 2023-03-09 PROBLEM — K52.9 COLITIS: Status: RESOLVED | Noted: 2021-09-13 | Resolved: 2023-03-09

## 2023-03-09 PROBLEM — Z32.00 ENCOUNTER FOR PREGNANCY TEST, RESULT UNKNOWN: Status: RESOLVED | Noted: 2022-01-18 | Resolved: 2023-03-09

## 2023-03-09 PROBLEM — N89.8 VAGINAL DISCHARGE: Status: RESOLVED | Noted: 2022-02-25 | Resolved: 2023-03-09

## 2023-03-09 PROBLEM — N94.9 VAGINAL BURNING: Status: RESOLVED | Noted: 2021-10-20 | Resolved: 2023-03-09

## 2023-03-09 PROBLEM — B37.31 VAGINAL YEAST INFECTION: Status: RESOLVED | Noted: 2021-09-13 | Resolved: 2023-03-09

## 2023-03-09 PROBLEM — R14.0 ABDOMINAL BLOATING: Status: RESOLVED | Noted: 2021-10-20 | Resolved: 2023-03-09

## 2023-03-09 PROBLEM — N94.89 VAGINAL BURNING: Status: RESOLVED | Noted: 2021-10-20 | Resolved: 2023-03-09

## 2023-03-09 PROBLEM — Z20.2 POSSIBLE EXPOSURE TO STD: Status: RESOLVED | Noted: 2021-10-20 | Resolved: 2023-03-09

## 2023-03-09 PROCEDURE — 99213 OFFICE O/P EST LOW 20 MIN: CPT | Performed by: STUDENT IN AN ORGANIZED HEALTH CARE EDUCATION/TRAINING PROGRAM

## 2023-03-09 RX ORDER — OXCARBAZEPINE 150 MG/1
1 TABLET, FILM COATED ORAL DAILY
COMMUNITY
Start: 2023-03-06 | End: 2023-03-22

## 2023-03-09 NOTE — PROGRESS NOTES
"Chief Complaint  Abdominal Pain (Stomach discomfort follow up)    History of Present Illness     Belly pain  She says the medication has helped with her belly pain.   She has not had the pain come back.   No fevers.   No vomiting.   She feels better.    She is still constipated and this is a chronic issue.       The following portions of the patient's history were reviewed and updated as appropriate: allergies, current medications, past family history, past medical history, past social history, past surgical history, and problem list.    OBJECTIVE:  /74   Pulse 92   Temp 97.5 °F (36.4 °C)   Resp 16   Ht 157.5 cm (62\")   Wt 70.5 kg (155 lb 6.4 oz)   SpO2 99%   BMI 28.42 kg/m²       Physical Exam  Constitutional:       General: She is not in acute distress.     Appearance: Normal appearance.   HENT:      Head: Normocephalic and atraumatic.   Eyes:      Extraocular Movements: Extraocular movements intact.   Cardiovascular:      Rate and Rhythm: Normal rate and regular rhythm.      Heart sounds: No murmur heard.  Pulmonary:      Effort: Pulmonary effort is normal. No respiratory distress.      Breath sounds: Normal breath sounds. No stridor. No wheezing, rhonchi or rales.   Abdominal:      General: Bowel sounds are normal.      Palpations: Abdomen is soft.      Tenderness: There is no abdominal tenderness. There is no guarding or rebound.   Skin:     Findings: No rash.   Neurological:      General: No focal deficit present.      Mental Status: She is alert.   Psychiatric:         Mood and Affect: Mood normal.                    Assessment and Plan   Diagnoses and all orders for this visit:    1. Other constipation (Primary)    Other orders  -     linaclotide (Linzess) 72 MCG capsule capsule; Take 1 capsule by mouth Every Morning Before Breakfast.  Dispense: 30 capsule; Refill: 5        Start linzess.   Do not take in pregnancy.   Counseled on possible diarrhea with this.     Advised her to return for any " recurrent symptoms we can do further imaging, but she feels better at this time.     Return in about 3 months (around 6/9/2023).       Dianna Garcia D.O.  [facility]

## 2023-03-13 ENCOUNTER — TELEPHONE (OUTPATIENT)
Dept: FAMILY MEDICINE CLINIC | Facility: CLINIC | Age: 23
End: 2023-03-13

## 2023-03-13 NOTE — TELEPHONE ENCOUNTER
Caller: Alaina Kennedy    Relationship: Self    Best call back number: [phone]    What form or medical record are you requesting: PRIOR AUTHORIZATION     Who is requesting this form or medical record from you: INSURANCE/ PHARMACY  [facility] [address] [place] [address] [phone] [facility] [phone] FX     How would you like to receive the form or medical records (pick-up, mail, fax): FAX  If fax, what is the fax number:[phone]    Timeframe paperwork needed: ASAP    Additional notes: PATIENT'S PHARMACY IS NEEDING A PRIOR AUTHORIZATION FOR THE MEDICATION SENT OVER AFTER HER LAST VISIT ON 03.09.23. SHE WAS TOLD THAT THERE WOULD BE TWO MEDICATIONS SENT OVER BUT IS NOT SURE WHICH ONE THE PHARMACY IS NEEDING THE PRIOR AUTHORIZATION FOR. PLEASE CONTACT THE PHARMACY TO CLARIFY IF UNSURE.

## 2023-03-14 ENCOUNTER — PRIOR AUTHORIZATION (OUTPATIENT)
Dept: FAMILY MEDICINE CLINIC | Facility: CLINIC | Age: 23
End: 2023-03-14
Payer: COMMERCIAL

## 2023-03-15 ENCOUNTER — PRIOR AUTHORIZATION (OUTPATIENT)
Dept: FAMILY MEDICINE CLINIC | Facility: CLINIC | Age: 23
End: 2023-03-15
Payer: COMMERCIAL

## 2023-03-22 ENCOUNTER — OFFICE VISIT (OUTPATIENT)
Dept: FAMILY MEDICINE CLINIC | Facility: CLINIC | Age: 23
End: 2023-03-22
Payer: COMMERCIAL

## 2023-03-22 VITALS
HEIGHT: 62 IN | OXYGEN SATURATION: 98 % | SYSTOLIC BLOOD PRESSURE: 112 MMHG | HEART RATE: 104 BPM | RESPIRATION RATE: 21 BRPM | TEMPERATURE: 97.7 F | DIASTOLIC BLOOD PRESSURE: 64 MMHG | WEIGHT: 148.8 LBS | BODY MASS INDEX: 27.38 KG/M2

## 2023-03-22 DIAGNOSIS — Z3A.01 LESS THAN 8 WEEKS GESTATION OF PREGNANCY: Primary | ICD-10-CM

## 2023-03-22 DIAGNOSIS — K21.9 GASTROESOPHAGEAL REFLUX DISEASE, UNSPECIFIED WHETHER ESOPHAGITIS PRESENT: ICD-10-CM

## 2023-03-22 PROBLEM — Z34.90 PREGNANCY: Status: ACTIVE | Noted: 2023-03-22

## 2023-03-22 RX ORDER — SUCRALFATE 1 G/1
1 TABLET ORAL 3 TIMES DAILY PRN
Qty: 90 TABLET | Refills: 1 | Status: SHIPPED | OUTPATIENT
Start: 2023-03-22

## 2023-03-22 NOTE — PROGRESS NOTES
"Chief Complaint  Med Refill (Discuss meds and pregnacy)    History of Present Illness    Date of first day of last missed period was Saturday the 18th.   She has not taken any of her medications since becoming pregnant. She never started the med.  She has not had side effects from stopping her medications suddenly.   She feels her depression is controlled and she does not want to be on any category b or c meds while pregnant.   She is established with [facility]. She is without any pain today.    The following portions of the patient's history were reviewed and updated as appropriate: allergies, current medications, past family history, past medical history, past social history, past surgical history, and problem list.    OBJECTIVE:  /64   Pulse 104   Temp 97.7 °F (36.5 °C)   Resp 21   Ht 157.5 cm (62\")   Wt 67.5 kg (148 lb 12.8 oz)   SpO2 98%   BMI 27.22 kg/m²       Physical Exam  Constitutional:       General: She is not in acute distress.     Appearance: Normal appearance.   HENT:      Head: Normocephalic and atraumatic.   Eyes:      Extraocular Movements: Extraocular movements intact.   Cardiovascular:      Rate and Rhythm: Normal rate and regular rhythm.      Heart sounds: No murmur heard.  Pulmonary:      Effort: Pulmonary effort is normal. No respiratory distress.      Breath sounds: Normal breath sounds. No stridor. No wheezing, rhonchi or rales.   Abdominal:      General: Bowel sounds are normal.      Palpations: Abdomen is soft.      Tenderness: There is no abdominal tenderness. There is no guarding or rebound.   Skin:     Findings: No rash.   Neurological:      General: No focal deficit present.      Mental Status: She is alert.   Psychiatric:         Mood and Affect: Mood normal.                    Assessment and Plan   Diagnoses and all orders for this visit:    1. Less than 8 weeks gestation of pregnancy (Primary)    2. Gastroesophageal reflux disease, unspecified whether esophagitis " present    Other orders  -     sucralfate (Carafate) 1 g tablet; Take 1 tablet by mouth 3 (Three) Times a Day As Needed (reflux).  Dispense: 90 tablet; Refill: 1  -     Prenatal MV & Min w/FA-DHA (Prenatal Adult Gummy/DHA/FA) 0.4-25 MG chewable tablet; Chew 1 each Daily.  Dispense: 30 each; Refill: 11          Start prenatal vitamin. Advised her to not take the linzess and the pepcid. Will switch to carafate for gerd as needed. Advised her to take carafate apart from prenatal/necessary meds as this can cause malabsorption. Will call in prenatal. She will call to schedule with obgyn.       Return in about 9 months (around 12/22/2023).       Dianna Garcia D.O.  [facility]

## 2023-04-07 ENCOUNTER — TELEPHONE (OUTPATIENT)
Dept: FAMILY MEDICINE CLINIC | Facility: CLINIC | Age: 23
End: 2023-04-07

## 2023-06-09 ENCOUNTER — OFFICE VISIT (OUTPATIENT)
Dept: FAMILY MEDICINE CLINIC | Facility: CLINIC | Age: 23
End: 2023-06-09
Payer: COMMERCIAL

## 2023-06-09 VITALS
RESPIRATION RATE: 18 BRPM | HEART RATE: 74 BPM | OXYGEN SATURATION: 99 % | TEMPERATURE: 96.4 F | BODY MASS INDEX: 27.46 KG/M2 | WEIGHT: 149.2 LBS | SYSTOLIC BLOOD PRESSURE: 90 MMHG | DIASTOLIC BLOOD PRESSURE: 58 MMHG | HEIGHT: 62 IN

## 2023-06-09 DIAGNOSIS — Z30.09 BIRTH CONTROL COUNSELING: Primary | ICD-10-CM

## 2023-06-09 PROCEDURE — 99214 OFFICE O/P EST MOD 30 MIN: CPT | Performed by: STUDENT IN AN ORGANIZED HEALTH CARE EDUCATION/TRAINING PROGRAM

## 2023-06-09 PROCEDURE — 81025 URINE PREGNANCY TEST: CPT | Performed by: STUDENT IN AN ORGANIZED HEALTH CARE EDUCATION/TRAINING PROGRAM

## 2023-06-09 RX ORDER — OXCARBAZEPINE 150 MG/1
1 TABLET, FILM COATED ORAL DAILY
COMMUNITY
Start: 2023-05-12 | End: 2023-06-09

## 2023-06-09 RX ORDER — NORGESTIMATE AND ETHINYL ESTRADIOL 7DAYSX3 28
1 KIT ORAL DAILY
Qty: 28 TABLET | Refills: 12 | Status: SHIPPED | OUTPATIENT
Start: 2023-06-09 | End: 2024-06-08

## 2023-06-09 RX ORDER — ESCITALOPRAM OXALATE 10 MG/1
1 TABLET ORAL DAILY
COMMUNITY
Start: 2023-05-12

## 2023-06-09 RX ORDER — FAMOTIDINE 20 MG/1
20 TABLET, FILM COATED ORAL 2 TIMES DAILY
Qty: 90 TABLET | Refills: 1 | Status: SHIPPED | OUTPATIENT
Start: 2023-06-09 | End: 2023-06-09 | Stop reason: SDUPTHER

## 2023-06-09 RX ORDER — FAMOTIDINE 20 MG/1
20 TABLET, FILM COATED ORAL 2 TIMES DAILY
Qty: 90 TABLET | Refills: 1 | Status: SHIPPED | OUTPATIENT
Start: 2023-06-09

## 2023-06-09 NOTE — PROGRESS NOTES
"Chief Complaint  Constipation (Pt hasn't started meds, wants to try to get back on, discuss birth control , pt lost baby)    History of Present Illness    She is not pregnant at this time and she would like to be on birth control to prevent future pregnancy.     She would like to be back on linzess for constipation and ppi for reflux.     The following portions of the patient's history were reviewed and updated as appropriate: allergies, current medications, past family history, past medical history, past social history, past surgical history, and problem list.    OBJECTIVE:  BP 90/58   Pulse 74   Temp 96.4 °F (35.8 °C)   Resp 18   Ht 157.5 cm (62\")   Wt 67.7 kg (149 lb 3.2 oz)   SpO2 99%   BMI 27.29 kg/m²       Physical Exam  Constitutional:       General: She is not in acute distress.     Appearance: Normal appearance.   HENT:      Head: Normocephalic and atraumatic.   Eyes:      Extraocular Movements: Extraocular movements intact.   Cardiovascular:      Rate and Rhythm: Normal rate and regular rhythm.      Heart sounds: No murmur heard.  Pulmonary:      Effort: Pulmonary effort is normal. No respiratory distress.      Breath sounds: Normal breath sounds. No stridor. No wheezing, rhonchi or rales.   Abdominal:      General: Bowel sounds are normal.      Palpations: Abdomen is soft.      Tenderness: There is no abdominal tenderness. There is no guarding or rebound.   Skin:     Findings: No rash.   Neurological:      General: No focal deficit present.      Mental Status: She is alert.   Psychiatric:         Mood and Affect: Mood normal.                  Assessment and Plan   Diagnoses and all orders for this visit:    1. Birth control counseling (Primary)  -     POCT pregnancy, urine    Other orders  -     Discontinue: linaclotide (Linzess) 72 MCG capsule capsule; Take 1 capsule by mouth Every Morning Before Breakfast.  Dispense: 30 capsule; Refill: 5  -     Discontinue: famotidine (Pepcid) 20 MG tablet; Take " 1 tablet by mouth 2 (Two) Times a Day.  Dispense: 90 tablet; Refill: 1  -     famotidine (Pepcid) 20 MG tablet; Take 1 tablet by mouth 2 (Two) Times a Day.  Dispense: 90 tablet; Refill: 1  -     linaclotide (Linzess) 72 MCG capsule capsule; Take 1 capsule by mouth Every Morning Before Breakfast.  Dispense: 30 capsule; Refill: 5  -     norgestimate-ethinyl estradiol (Ortho Tri-Cyclen, 28,) 0.18/0.215/0.25 MG-35 MCG per tablet; Take 1 tablet by mouth Daily.  Dispense: 28 tablet; Refill: 12      Birth control counseling  She would like to start on the daily birth control pill.   No CI to estrogen. This medication may increase or decrease the level of some medications, mainly seizure medications. If you have missed more than one pill you will need backup contraception for seven days. Take this pill at the same time everyday. It does not protect against stds.       Return in about 9 months (around 3/9/2024) for Annual.       Dianna Garcia D.O.  [facility]

## 2023-07-31 PROCEDURE — 99283 EMERGENCY DEPT VISIT LOW MDM: CPT

## 2023-08-01 ENCOUNTER — HOSPITAL ENCOUNTER (EMERGENCY)
Facility: HOSPITAL | Age: 23
Discharge: HOME | End: 2023-08-01
Attending: EMERGENCY MEDICINE | Admitting: EMERGENCY MEDICINE
Payer: COMMERCIAL

## 2023-08-01 VITALS
SYSTOLIC BLOOD PRESSURE: 114 MMHG | TEMPERATURE: 98 F | HEIGHT: 62 IN | DIASTOLIC BLOOD PRESSURE: 69 MMHG | RESPIRATION RATE: 18 BRPM | OXYGEN SATURATION: 100 % | HEART RATE: 68 BPM | BODY MASS INDEX: 26.31 KG/M2 | WEIGHT: 143 LBS

## 2023-08-01 DIAGNOSIS — Z86.59 HISTORY OF DEPRESSION: ICD-10-CM

## 2023-08-01 DIAGNOSIS — K52.9 GASTROENTERITIS: ICD-10-CM

## 2023-08-01 DIAGNOSIS — R19.7 DIARRHEA, UNSPECIFIED TYPE: Primary | ICD-10-CM

## 2023-08-01 DIAGNOSIS — Z86.19 HISTORY OF HELICOBACTER PYLORI INFECTION: ICD-10-CM

## 2023-08-01 DIAGNOSIS — Z86.59 HISTORY OF ANXIETY: ICD-10-CM

## 2023-08-01 LAB
ALBUMIN SERPL-MCNC: 3.7 G/DL (ref 3.5–5.2)
ALBUMIN/GLOB SERPL: 1 G/DL
ALP SERPL-CCNC: 107 U/L (ref 39–117)
ALT SERPL W P-5'-P-CCNC: 14 U/L (ref 1–33)
ANION GAP SERPL CALCULATED.3IONS-SCNC: 10 MMOL/L (ref 5–15)
AST SERPL-CCNC: 33 U/L (ref 1–32)
B-HCG UR QL: NEGATIVE
BACTERIA UR QL AUTO: ABNORMAL /HPF
BASOPHILS # BLD AUTO: 0.03 10*3/MM3 (ref 0–0.2)
BASOPHILS NFR BLD AUTO: 0.3 % (ref 0–1.5)
BILIRUB SERPL-MCNC: <0.2 MG/DL (ref 0–1.2)
BILIRUB UR QL STRIP: NEGATIVE
BUN SERPL-MCNC: 14 MG/DL (ref 6–20)
BUN/CREAT SERPL: 18.4 (ref 7–25)
CALCIUM SPEC-SCNC: 8.9 MG/DL (ref 8.6–10.5)
CHLORIDE SERPL-SCNC: 106 MMOL/L (ref 98–107)
CLARITY UR: ABNORMAL
CO2 SERPL-SCNC: 23 MMOL/L (ref 22–29)
COLOR UR: YELLOW
CREAT SERPL-MCNC: 0.76 MG/DL (ref 0.57–1)
D-LACTATE SERPL-SCNC: 0.5 MMOL/L (ref 0.5–2)
DEPRECATED RDW RBC AUTO: 42.9 FL (ref 37–54)
DEVELOPER EXPIRATION DATE: NORMAL
DEVELOPER LOT NUMBER: NORMAL
EGFRCR SERPLBLD CKD-EPI 2021: 113.8 ML/MIN/1.73
EOSINOPHIL # BLD AUTO: 0.49 10*3/MM3 (ref 0–0.4)
EOSINOPHIL NFR BLD AUTO: 5.2 % (ref 0.3–6.2)
ERYTHROCYTE [DISTWIDTH] IN BLOOD BY AUTOMATED COUNT: 12.9 % (ref 12.3–15.4)
EXPIRATION DATE: NORMAL
EXPIRATION DATE: NORMAL
FECAL OCCULT BLOOD SCREEN, POC: NEGATIVE
GLOBULIN UR ELPH-MCNC: 3.8 GM/DL
GLUCOSE SERPL-MCNC: 91 MG/DL (ref 65–99)
GLUCOSE UR STRIP-MCNC: NEGATIVE MG/DL
HCT VFR BLD AUTO: 39.7 % (ref 34–46.6)
HGB BLD-MCNC: 12.8 G/DL (ref 12–15.9)
HGB UR QL STRIP.AUTO: NEGATIVE
HYALINE CASTS UR QL AUTO: ABNORMAL /LPF
IMM GRANULOCYTES # BLD AUTO: 0.02 10*3/MM3 (ref 0–0.05)
IMM GRANULOCYTES NFR BLD AUTO: 0.2 % (ref 0–0.5)
INTERNAL NEGATIVE CONTROL: NEGATIVE
INTERNAL POSITIVE CONTROL: POSITIVE
KETONES UR QL STRIP: ABNORMAL
LEUKOCYTE ESTERASE UR QL STRIP.AUTO: ABNORMAL
LYMPHOCYTES # BLD AUTO: 3.73 10*3/MM3 (ref 0.7–3.1)
LYMPHOCYTES NFR BLD AUTO: 39.7 % (ref 19.6–45.3)
Lab: NORMAL
Lab: NORMAL
MCH RBC QN AUTO: 29.2 PG (ref 26.6–33)
MCHC RBC AUTO-ENTMCNC: 32.2 G/DL (ref 31.5–35.7)
MCV RBC AUTO: 90.4 FL (ref 79–97)
MONOCYTES # BLD AUTO: 0.67 10*3/MM3 (ref 0.1–0.9)
MONOCYTES NFR BLD AUTO: 7.1 % (ref 5–12)
NEGATIVE CONTROL: NEGATIVE
NEUTROPHILS NFR BLD AUTO: 4.46 10*3/MM3 (ref 1.7–7)
NEUTROPHILS NFR BLD AUTO: 47.5 % (ref 42.7–76)
NITRITE UR QL STRIP: NEGATIVE
NRBC BLD AUTO-RTO: 0 /100 WBC (ref 0–0.2)
PH UR STRIP.AUTO: 6 [PH] (ref 5–8)
PLATELET # BLD AUTO: 287 10*3/MM3 (ref 140–450)
PMV BLD AUTO: 10.2 FL (ref 6–12)
POSITIVE CONTROL: POSITIVE
POTASSIUM SERPL-SCNC: 4.6 MMOL/L (ref 3.5–5.2)
PROT SERPL-MCNC: 7.5 G/DL (ref 6–8.5)
PROT UR QL STRIP: NEGATIVE
RBC # BLD AUTO: 4.39 10*6/MM3 (ref 3.77–5.28)
RBC # UR STRIP: ABNORMAL /HPF
REF LAB TEST METHOD: ABNORMAL
SODIUM SERPL-SCNC: 139 MMOL/L (ref 136–145)
SP GR UR STRIP: 1.03 (ref 1–1.03)
SQUAMOUS #/AREA URNS HPF: ABNORMAL /HPF
UROBILINOGEN UR QL STRIP: ABNORMAL
WBC # UR STRIP: ABNORMAL /HPF
WBC NRBC COR # BLD: 9.4 10*3/MM3 (ref 3.4–10.8)

## 2023-08-01 PROCEDURE — 83605 ASSAY OF LACTIC ACID: CPT | Performed by: PHYSICIAN ASSISTANT

## 2023-08-01 PROCEDURE — 80053 COMPREHEN METABOLIC PANEL: CPT | Performed by: PHYSICIAN ASSISTANT

## 2023-08-01 PROCEDURE — 81025 URINE PREGNANCY TEST: CPT | Performed by: PHYSICIAN ASSISTANT

## 2023-08-01 PROCEDURE — 81001 URINALYSIS AUTO W/SCOPE: CPT | Performed by: PHYSICIAN ASSISTANT

## 2023-08-01 PROCEDURE — 85025 COMPLETE CBC W/AUTO DIFF WBC: CPT | Performed by: PHYSICIAN ASSISTANT

## 2023-08-01 PROCEDURE — 82270 OCCULT BLOOD FECES: CPT | Performed by: PHYSICIAN ASSISTANT

## 2023-08-17 ENCOUNTER — LAB (OUTPATIENT)
Dept: LAB | Facility: HOSPITAL | Age: 23
End: 2023-08-17
Payer: COMMERCIAL

## 2023-08-17 ENCOUNTER — OFFICE VISIT (OUTPATIENT)
Dept: FAMILY MEDICINE CLINIC | Facility: CLINIC | Age: 23
End: 2023-08-17
Payer: COMMERCIAL

## 2023-08-17 VITALS
WEIGHT: 149.2 LBS | SYSTOLIC BLOOD PRESSURE: 110 MMHG | HEIGHT: 62 IN | HEART RATE: 85 BPM | BODY MASS INDEX: 27.46 KG/M2 | RESPIRATION RATE: 19 BRPM | DIASTOLIC BLOOD PRESSURE: 68 MMHG | TEMPERATURE: 99.3 F

## 2023-08-17 DIAGNOSIS — Z11.3 ROUTINE SCREENING FOR STI (SEXUALLY TRANSMITTED INFECTION): ICD-10-CM

## 2023-08-17 DIAGNOSIS — K21.9 GASTROESOPHAGEAL REFLUX DISEASE, UNSPECIFIED WHETHER ESOPHAGITIS PRESENT: ICD-10-CM

## 2023-08-17 DIAGNOSIS — M54.30 SCIATICA, UNSPECIFIED LATERALITY: Primary | ICD-10-CM

## 2023-08-17 DIAGNOSIS — M54.30 SCIATICA, UNSPECIFIED LATERALITY: ICD-10-CM

## 2023-08-17 LAB
HCG SERPL QL: NEGATIVE
HCV AB SER DONR QL: NORMAL
HIV 1+2 AB+HIV1 P24 AG SERPL QL IA: NORMAL

## 2023-08-17 PROCEDURE — 86803 HEPATITIS C AB TEST: CPT

## 2023-08-17 PROCEDURE — 84703 CHORIONIC GONADOTROPIN ASSAY: CPT

## 2023-08-17 PROCEDURE — 86706 HEP B SURFACE ANTIBODY: CPT

## 2023-08-17 PROCEDURE — 86704 HEP B CORE ANTIBODY TOTAL: CPT

## 2023-08-17 PROCEDURE — 36415 COLL VENOUS BLD VENIPUNCTURE: CPT

## 2023-08-17 PROCEDURE — 87340 HEPATITIS B SURFACE AG IA: CPT

## 2023-08-17 PROCEDURE — 86592 SYPHILIS TEST NON-TREP QUAL: CPT

## 2023-08-17 PROCEDURE — G0432 EIA HIV-1/HIV-2 SCREEN: HCPCS

## 2023-08-17 PROCEDURE — 99214 OFFICE O/P EST MOD 30 MIN: CPT | Performed by: STUDENT IN AN ORGANIZED HEALTH CARE EDUCATION/TRAINING PROGRAM

## 2023-08-17 PROCEDURE — 83013 H PYLORI (C-13) BREATH: CPT | Performed by: STUDENT IN AN ORGANIZED HEALTH CARE EDUCATION/TRAINING PROGRAM

## 2023-08-17 RX ORDER — OXCARBAZEPINE 150 MG/1
1 TABLET, FILM COATED ORAL DAILY
COMMUNITY
Start: 2023-08-11

## 2023-08-17 RX ORDER — ERGOCALCIFEROL 1.25 MG/1
CAPSULE ORAL
COMMUNITY
Start: 2023-08-10 | End: 2023-08-17

## 2023-08-17 RX ORDER — FAMOTIDINE 20 MG/1
20 TABLET, FILM COATED ORAL DAILY
COMMUNITY

## 2023-08-17 NOTE — PROGRESS NOTES
"Chief Complaint  Back Pain (Follow up for R side lower back pain that runs down into her leg.) and Fatigue    Back Pain    Fatigue  Associated symptoms include fatigue.       Diarrhea  Seen in er for this, but this has resolved.    Gerd   She says she takes the pepcid once daily.   She denies any belly pain. Occult blood testing is negative.       Fatigue and back pain  She says last week she had pain in her low back on the right side going into her right leg. She was given a steroid shot which helped. She says she has \"always\" been tired and just wanted to lay in bed all day. She doesn't have desire to do things.       The following portions of the patient's history were reviewed and updated as appropriate: allergies, current medications, past family history, past medical history, past social history, past surgical history, and problem list.    OBJECTIVE:  /68   Pulse 85   Temp 99.3 øF (37.4 øC) (Infrared)   Resp 19   Ht 157.5 cm (62\")   Wt 67.7 kg (149 lb 3.2 oz)   BMI 27.29 kg/mý       Physical Exam  Constitutional:       General: She is not in acute distress.     Appearance: Normal appearance.   HENT:      Head: Normocephalic and atraumatic.   Eyes:      Extraocular Movements: Extraocular movements intact.   Cardiovascular:      Rate and Rhythm: Normal rate and regular rhythm.      Heart sounds: No murmur heard.  Pulmonary:      Effort: Pulmonary effort is normal. No respiratory distress.      Breath sounds: Normal breath sounds. No stridor. No wheezing, rhonchi or rales.   Abdominal:      General: Bowel sounds are normal.      Palpations: Abdomen is soft.      Tenderness: There is no abdominal tenderness. There is no guarding or rebound.   Musculoskeletal:      Comments: No pain on palpation of low back  Normal patellar reflexes  Normal lower extremity muscle strength and sensations     Skin:     Findings: No rash.   Neurological:      General: No focal deficit present.      Mental Status: She is " alert.   Psychiatric:         Mood and Affect: Mood normal.                  Assessment and Plan   Diagnoses and all orders for this visit:    1. Sciatica, unspecified laterality (Primary)  -     XR Spine Lumbar 2 or 3 View (In Office)  -     hCG, Serum, Qualitative; Future  -     Ambulatory Referral to Physical Therapy    2. Gastroesophageal reflux disease, unspecified whether esophagitis present  -     H. Pylori Breath Test - Breath, Lung  -     Cancel: POCT pregnancy, urine  -     hCG, Serum, Qualitative; Future    3. Routine screening for STI (sexually transmitted infection)  -     RPR; Future  -     OneSwab - Kit, Vagina; Future  -     Hepatitis B Virus (HBV) Screening and Diagnosis; Future  -     Hepatitis C Antibody; Future  -     HIV-1 / O / 2 Ag / Antibody 4th Generation; Future      Fatigue with depression  Labs stable.   No signs of sleep apnea  Continue to follow with psychiatry.   Reviewed labs including cbc cmp urinalysis.   Continue to monitor for any new symptoms.      She would like sti screening    Sciatica  Will refer to pt for this.   Avoid nsaids given gerd  Pain resolved today.       Gerd  Continue ppi.  Occult blood negative.  Check h pylori.    Return in about 6 months (around 2/17/2024) for Annual.       Dianna Garcia D.O.  [facility]

## 2023-08-18 ENCOUNTER — HOSPITAL ENCOUNTER (EMERGENCY)
Facility: HOSPITAL | Age: 23
Discharge: HOME | End: 2023-08-18
Attending: EMERGENCY MEDICINE
Payer: OTHER MISCELLANEOUS

## 2023-08-18 VITALS
RESPIRATION RATE: 18 BRPM | SYSTOLIC BLOOD PRESSURE: 116 MMHG | TEMPERATURE: 98.6 F | HEART RATE: 77 BPM | HEIGHT: 62 IN | OXYGEN SATURATION: 99 % | DIASTOLIC BLOOD PRESSURE: 54 MMHG | BODY MASS INDEX: 26.87 KG/M2 | WEIGHT: 146 LBS

## 2023-08-18 DIAGNOSIS — S06.0X0A CONCUSSION WITHOUT LOSS OF CONSCIOUSNESS, INITIAL ENCOUNTER: Primary | ICD-10-CM
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
## 2023-08-18 LAB
HBV CORE AB SERPL QL IA: NEGATIVE
HBV SURFACE AB SER QL: NON REACTIVE
HBV SURFACE AG SERPL QL IA: NEGATIVE
IMP & REVIEW OF LAB RESULTS: NORMAL
LABORATORY COMMENT REPORT: NORMAL
RPR SER QL: NORMAL
UREA BREATH TEST QL: NEGATIVE

## 2023-08-18 PROCEDURE — 99282 EMERGENCY DEPT VISIT SF MDM: CPT

## 2023-08-19 NOTE — DISCHARGE INSTRUCTIONS
I recommend you take Tylenol 650 mg every 6 hours and ibuprofen 400 mg every 6 hours as needed for pain unless you have a contraindication to these medications  Return to the ER as needed for new or worsening symptoms.
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
## 2023-09-13 ENCOUNTER — OFFICE VISIT (OUTPATIENT)
Dept: FAMILY MEDICINE CLINIC | Facility: CLINIC | Age: 23
End: 2023-09-13
Payer: COMMERCIAL

## 2023-09-13 VITALS
HEART RATE: 75 BPM | SYSTOLIC BLOOD PRESSURE: 108 MMHG | BODY MASS INDEX: 28.45 KG/M2 | TEMPERATURE: 98.6 F | RESPIRATION RATE: 13 BRPM | WEIGHT: 154.6 LBS | DIASTOLIC BLOOD PRESSURE: 66 MMHG | HEIGHT: 62 IN | OXYGEN SATURATION: 98 %

## 2023-09-13 DIAGNOSIS — G44.309 POST-TRAUMATIC HEADACHE, NOT INTRACTABLE, UNSPECIFIED CHRONICITY PATTERN: Primary | ICD-10-CM

## 2023-09-13 DIAGNOSIS — R11.0 CHRONIC NAUSEA: ICD-10-CM

## 2023-09-13 DIAGNOSIS — Z00.00 ANNUAL PHYSICAL EXAM: ICD-10-CM

## 2023-09-13 RX ORDER — MELATONIN
1000 DAILY
Qty: 90 TABLET | Refills: 1 | Status: SHIPPED | OUTPATIENT
Start: 2023-09-13
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
## 2023-09-14 DIAGNOSIS — M41.9 SCOLIOSIS OF LUMBAR SPINE, UNSPECIFIED SCOLIOSIS TYPE: Primary | ICD-10-CM

## 2023-09-19 ENCOUNTER — TELEPHONE (OUTPATIENT)
Dept: FAMILY MEDICINE CLINIC | Facility: CLINIC | Age: 23
End: 2023-09-19

## 2023-09-19 NOTE — TELEPHONE ENCOUNTER
Caller: Alaina Kennedy    Relationship: Self    Best call back number: [phone]    Requested Prescriptions: BIRTH CONTROL; NOT SURE OF THE NAME OF THE MEDICATION       Pharmacy where request should be sent:      [id number] [address] [facility] [address] - [phone] [facility] [phone] FX     Last office visit with prescribing clinician: 9/13/2023   Last telemedicine visit with prescribing clinician: Visit date not found   Next office visit with prescribing clinician: 2/19/2024     Additional details provided by patient:     Does the patient have less than a 3 day supply:  [x] Yes  [] No    Would you like a call back once the refill request has been completed: [x] Yes [] No    If the office needs to give you a call back, can they leave a voicemail: [x] Yes [] No    Mark Arriaga Rep   09/19/23 13:31 EDT

## 2023-09-20 RX ORDER — NORGESTIMATE AND ETHINYL ESTRADIOL 7DAYSX3 28
1 KIT ORAL DAILY
Qty: 28 TABLET | Refills: 12 | Status: SHIPPED | OUTPATIENT
Start: 2023-09-20 | End: 2024-09-19

## 2023-09-22 ENCOUNTER — OFFICE VISIT (OUTPATIENT)
Dept: GASTROENTEROLOGY | Facility: CLINIC | Age: 23
End: 2023-09-22
Payer: COMMERCIAL

## 2023-09-22 VITALS
BODY MASS INDEX: 28.41 KG/M2 | SYSTOLIC BLOOD PRESSURE: 100 MMHG | WEIGHT: 154.4 LBS | HEIGHT: 62 IN | TEMPERATURE: 97.1 F | DIASTOLIC BLOOD PRESSURE: 70 MMHG | HEART RATE: 82 BPM | OXYGEN SATURATION: 98 %

## 2023-09-22 DIAGNOSIS — R19.4 CHANGE IN BOWEL HABITS: Primary | ICD-10-CM

## 2023-09-22 DIAGNOSIS — R14.0 BLOATING: ICD-10-CM

## 2023-09-22 DIAGNOSIS — R11.0 NAUSEA: ICD-10-CM

## 2023-09-22 DIAGNOSIS — R10.84 GENERALIZED ABDOMINAL PAIN: ICD-10-CM

## 2023-09-22 NOTE — PROGRESS NOTES
Patient Name: Alaina Kennedy  YOB: 2000   Medical Record number: [id number]     PCP: Dianna Garcia DO    Chief Complaint   Patient presents with    Nausea    Abdominal Pain       History of Present Illness:   HPI  I appreciate the consult for abdominal pain, nausea and diarrhea.  Ms. Kennedy is a 22-year-old with a history of anxiety, depression and reported H. pylori over a year ago.  She also states that a couple of years ago a diagnosis of colitis was made.  Ms. Kennedy was on a short course of medication.  The patient describes some generalized abdominal pain that is sharp and crampy.  She will experience symptoms at least 4 times a week and has been present for over 6 months. The patient admits to having periods of loose stool without nocturnal diarrhea.  However, there are episodes of constipation.  Ms. Kennedy does admit to bloating with meals.  There is no history of difficult or painful swallowing.  The patient denies any breakthrough heartburn.  There is no history of unexplained weight loss.  She denies any family history of Crohn's disease or ulcerative colitis.  There is no family history of gastrointestinal cancer.  Past Medical History:   Diagnosis Date    Anxiety     Chlamydia     a year ago, treated    Cholelithiasis     Depression     Gonorrhea     a year ago, treated    H. pylori infection        Past Surgical History:   Procedure Laterality Date    WISDOM TOOTH EXTRACTION           Current Outpatient Medications:     cholecalciferol (Vitamin D) 25 MCG (1000 UT) tablet, Take 1 tablet by mouth Daily., Disp: 90 tablet, Rfl: 1    escitalopram (LEXAPRO) 10 MG tablet, Take 1 tablet by mouth Daily., Disp: , Rfl:     famotidine (PEPCID) 20 MG tablet, Take 1 tablet by mouth Daily., Disp: , Rfl:     norgestimate-ethinyl estradiol (Ortho Tri-Cyclen, 28,) 0.18/0.215/0.25 MG-35 MCG per tablet, Take 1 tablet by mouth Daily., Disp: 28 tablet, Rfl: 12    OXcarbazepine (TRILEPTAL) 150 MG tablet,  Take 1 tablet by mouth Daily., Disp: , Rfl:     No Known Allergies    Family History   Problem Relation Age of Onset    Diabetes Maternal Grandfather        Social History     Socioeconomic History    Marital status: Single   Tobacco Use    Smoking status: Never    Smokeless tobacco: Never   Vaping Use    Vaping Use: Never used   Substance and Sexual Activity    Alcohol use: Yes     Comment: socially     Drug use: Not Currently     Types: Marijuana     Comment: last use July 2020    Sexual activity: Yes     Partners: Female, Male     Birth control/protection: None       Review of Systems   Constitutional:  Negative for activity change, appetite change, fatigue, fever and unexpected weight change.   HENT:  Negative for dental problem, hearing loss, mouth sores, postnasal drip, sneezing, trouble swallowing and voice change.    Eyes:  Negative for pain, redness, itching and visual disturbance.   Respiratory:  Negative for cough, choking, chest tightness, shortness of breath and wheezing.    Cardiovascular:  Negative for chest pain, palpitations and leg swelling.   Gastrointestinal:  Positive for abdominal distention (bloating), abdominal pain, blood in stool, constipation and nausea. Negative for anal bleeding, diarrhea, rectal pain and vomiting.   Endocrine: Negative for cold intolerance, heat intolerance, polydipsia, polyphagia and polyuria.   Genitourinary: Negative.  Negative for dysuria, enuresis, flank pain, hematuria and urgency.   Musculoskeletal:  Negative for arthralgias, back pain, gait problem, joint swelling and myalgias.   Skin:  Negative for color change, pallor and rash.   Allergic/Immunologic: Negative for environmental allergies, food allergies and immunocompromised state.   Neurological:  Negative for dizziness, tremors, seizures, facial asymmetry, speech difficulty, numbness and headaches.   Hematological:  Negative for adenopathy.   Psychiatric/Behavioral:  Negative for behavioral problems,  confusion, dysphoric mood, hallucinations and self-injury.      Vitals:    09/22/23 1005   BP: 100/70   Pulse: 82   Temp: 97.1 °F (36.2 °C)   SpO2: 98%       Physical Exam  Vitals reviewed.   Constitutional:       General: She is not in acute distress.     Appearance: Normal appearance.   HENT:      Head: Normocephalic and atraumatic.      Nose: Nose normal.      Mouth/Throat:      Mouth: Mucous membranes are moist.      Pharynx: Oropharynx is clear.   Eyes:      General: No scleral icterus.     Extraocular Movements: Extraocular movements intact.   Cardiovascular:      Rate and Rhythm: Normal rate and regular rhythm.      Heart sounds: No murmur heard.  Pulmonary:      Breath sounds: Normal breath sounds. No wheezing or rales.   Abdominal:      General: Bowel sounds are normal.      Palpations: Abdomen is soft.      Tenderness: There is abdominal tenderness (left lower, epigastrium). There is no guarding.   Musculoskeletal:         General: No swelling. Normal range of motion.      Cervical back: Normal range of motion and neck supple. No rigidity.   Skin:     General: Skin is dry.      Coloration: Skin is not jaundiced.   Neurological:      Mental Status: She is alert and oriented to person, place, and time.   Psychiatric:         Mood and Affect: Mood normal.         Thought Content: Thought content normal.         Judgment: Judgment normal.       Diagnoses and all orders for this visit:    1. Change in bowel habits (Primary)  -     Colonoscopy; Future    2. Bloating  -     Esophagogastroduodenoscopy; Future  -     Colonoscopy; Future    3. Nausea  -     Esophagogastroduodenoscopy; Future    4. Generalized abdominal pain  -     Esophagogastroduodenoscopy; Future  -     Colonoscopy; Future    The differential diagnosis includes celiac sprue, H. pylori gastritis and peptic ulcer disease.  The patient does have criteria for IBS but will need to evaluate for inflammatory bowel disease.      Plan: Will proceed with an  EGD and colonoscopy for further evaluation.

## 2023-10-15 ENCOUNTER — HOSPITAL ENCOUNTER (OUTPATIENT)
Dept: CT IMAGING | Facility: HOSPITAL | Age: 23
Discharge: HOME | End: 2023-10-15
Payer: OTHER MISCELLANEOUS

## 2023-10-19 ENCOUNTER — TELEPHONE (OUTPATIENT)
Dept: FAMILY MEDICINE CLINIC | Facility: CLINIC | Age: 23
End: 2023-10-19
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
## 2023-10-21 NOTE — TELEPHONE ENCOUNTER
"Hub please read: \"  Please have her schedule a visit to discuss for her most recent menstrual cycle issues.\"          "

## 2023-11-01 ENCOUNTER — HOSPITAL ENCOUNTER (OUTPATIENT)
Dept: CT IMAGING | Facility: HOSPITAL | Age: 23
Discharge: HOME | End: 2023-11-01
Admitting: STUDENT IN AN ORGANIZED HEALTH CARE EDUCATION/TRAINING PROGRAM
Payer: OTHER MISCELLANEOUS

## 2023-11-01 DIAGNOSIS — G44.309 POST-TRAUMATIC HEADACHE, NOT INTRACTABLE, UNSPECIFIED CHRONICITY PATTERN: ICD-10-CM

## 2023-11-01 PROCEDURE — 70450 CT HEAD/BRAIN W/O DYE: CPT

## 2023-11-03 ENCOUNTER — LAB (OUTPATIENT)
Dept: LAB | Facility: HOSPITAL | Age: 23
End: 2023-11-03
Payer: COMMERCIAL

## 2023-11-03 ENCOUNTER — OFFICE VISIT (OUTPATIENT)
Dept: FAMILY MEDICINE CLINIC | Facility: CLINIC | Age: 23
End: 2023-11-03
Payer: COMMERCIAL

## 2023-11-03 VITALS
HEIGHT: 62 IN | WEIGHT: 161.4 LBS | TEMPERATURE: 98.6 F | HEART RATE: 75 BPM | DIASTOLIC BLOOD PRESSURE: 60 MMHG | BODY MASS INDEX: 29.7 KG/M2 | SYSTOLIC BLOOD PRESSURE: 100 MMHG | OXYGEN SATURATION: 98 % | RESPIRATION RATE: 21 BRPM

## 2023-11-03 DIAGNOSIS — Z11.3 ROUTINE SCREENING FOR STI (SEXUALLY TRANSMITTED INFECTION): Primary | ICD-10-CM

## 2023-11-03 LAB
B-HCG UR QL: NEGATIVE
EXPIRATION DATE: NORMAL
HCV AB SER DONR QL: NORMAL
HIV 1+2 AB+HIV1 P24 AG SERPL QL IA: NORMAL
INTERNAL NEGATIVE CONTROL: NORMAL
INTERNAL POSITIVE CONTROL: NORMAL
Lab: NORMAL

## 2023-11-03 PROCEDURE — 36415 COLL VENOUS BLD VENIPUNCTURE: CPT | Performed by: STUDENT IN AN ORGANIZED HEALTH CARE EDUCATION/TRAINING PROGRAM

## 2023-11-03 PROCEDURE — 86706 HEP B SURFACE ANTIBODY: CPT | Performed by: STUDENT IN AN ORGANIZED HEALTH CARE EDUCATION/TRAINING PROGRAM

## 2023-11-03 PROCEDURE — 86704 HEP B CORE ANTIBODY TOTAL: CPT | Performed by: STUDENT IN AN ORGANIZED HEALTH CARE EDUCATION/TRAINING PROGRAM

## 2023-11-03 PROCEDURE — 86803 HEPATITIS C AB TEST: CPT | Performed by: STUDENT IN AN ORGANIZED HEALTH CARE EDUCATION/TRAINING PROGRAM

## 2023-11-03 PROCEDURE — 86592 SYPHILIS TEST NON-TREP QUAL: CPT | Performed by: STUDENT IN AN ORGANIZED HEALTH CARE EDUCATION/TRAINING PROGRAM

## 2023-11-03 PROCEDURE — 87591 N.GONORRHOEAE DNA AMP PROB: CPT | Performed by: STUDENT IN AN ORGANIZED HEALTH CARE EDUCATION/TRAINING PROGRAM

## 2023-11-03 PROCEDURE — 87491 CHLMYD TRACH DNA AMP PROBE: CPT | Performed by: STUDENT IN AN ORGANIZED HEALTH CARE EDUCATION/TRAINING PROGRAM

## 2023-11-03 PROCEDURE — G0432 EIA HIV-1/HIV-2 SCREEN: HCPCS | Performed by: STUDENT IN AN ORGANIZED HEALTH CARE EDUCATION/TRAINING PROGRAM

## 2023-11-03 PROCEDURE — 87340 HEPATITIS B SURFACE AG IA: CPT | Performed by: STUDENT IN AN ORGANIZED HEALTH CARE EDUCATION/TRAINING PROGRAM

## 2023-11-03 PROCEDURE — 99213 OFFICE O/P EST LOW 20 MIN: CPT | Performed by: STUDENT IN AN ORGANIZED HEALTH CARE EDUCATION/TRAINING PROGRAM

## 2023-11-03 PROCEDURE — 81025 URINE PREGNANCY TEST: CPT | Performed by: STUDENT IN AN ORGANIZED HEALTH CARE EDUCATION/TRAINING PROGRAM

## 2023-11-03 RX ORDER — CETIRIZINE HYDROCHLORIDE 10 MG/1
10 TABLET ORAL DAILY
COMMUNITY
Start: 2023-10-21

## 2023-11-03 RX ORDER — ESCITALOPRAM OXALATE 20 MG/1
20 TABLET ORAL DAILY
COMMUNITY
Start: 2023-10-21

## 2023-11-03 RX ORDER — FLUTICASONE PROPIONATE 50 MCG
2 SPRAY, SUSPENSION (ML) NASAL DAILY
COMMUNITY
Start: 2023-10-21

## 2023-11-03 NOTE — PROGRESS NOTES
"Chief Complaint  STD screening and Results (Pt would like to review recent xray results)    History of Present Illness      She would like full sti screening.   She does not have a definite exposure and does not have any symptoms.         Low back pain  She would like to get in pt.       The following portions of the patient's history were reviewed and updated as appropriate: allergies, current medications, past family history, past medical history, past social history, past surgical history, and problem list.    OBJECTIVE:  /60   Pulse 75   Temp 98.6 °F (37 °C) (Temporal)   Resp 21   Ht 157.5 cm (62.01\")   Wt 73.2 kg (161 lb 6.4 oz)   SpO2 98%   BMI 29.51 kg/m²       Physical Exam  Constitutional:       General: She is not in acute distress.     Appearance: Normal appearance.   HENT:      Head: Normocephalic and atraumatic.   Eyes:      Extraocular Movements: Extraocular movements intact.   Cardiovascular:      Rate and Rhythm: Normal rate and regular rhythm.      Heart sounds: No murmur heard.  Pulmonary:      Effort: Pulmonary effort is normal. No respiratory distress.      Breath sounds: Normal breath sounds. No stridor. No wheezing, rhonchi or rales.   Skin:     Findings: No rash.   Neurological:      General: No focal deficit present.      Mental Status: She is alert.   Psychiatric:         Mood and Affect: Mood normal.                    Assessment and Plan   Diagnoses and all orders for this visit:    1. Routine screening for STI (sexually transmitted infection) (Primary)  -     POC Pregnancy, Urine  -     Cancel: Chlamydia trachomatis, Neisseria gonorrhoeae, PCR w/ confirmation - Swab, Vagina; Future  -     CT/NG, TV, PCR - Urine, Urine, Clean Catch; Future  -     Hepatitis B Virus (HBV) Screening and Diagnosis; Future  -     Hepatitis C Antibody; Future  -     RPR; Future  -     HIV-1 / O / 2 Ag / Antibody; Future        Advised her to call our office and speak to someone in referrals to get " her pt initiated. She has been referred to ortho.         No follow-ups on file.       Dianna Garcia D.O.  [facility]

## 2023-11-04 LAB
HBV CORE AB SERPL QL IA: NEGATIVE
HBV SURFACE AB SER QL: NON REACTIVE
HBV SURFACE AG SERPL QL IA: NEGATIVE
IMP & REVIEW OF LAB RESULTS: NORMAL
LABORATORY COMMENT REPORT: NORMAL
RPR SER QL: NORMAL

## 2023-11-07 LAB
C TRACH RRNA SPEC QL NAA+PROBE: NEGATIVE
N GONORRHOEA RRNA SPEC QL NAA+PROBE: NEGATIVE

## 2023-11-15 ENCOUNTER — PRIOR AUTHORIZATION (OUTPATIENT)
Dept: GASTROENTEROLOGY | Facility: CLINIC | Age: 23
End: 2023-11-15
Payer: COMMERCIAL

## 2023-11-15 NOTE — TELEPHONE ENCOUNTER
Alaina Rodriguez Key: [id number] - PA Case ID: [id number] - Rx #: [id number] help? Call us at [phone]  Status  Sent to [facility]  Drug  Sutab [id number] tablets  Form  [employer] [place] Medicaid ePA Form 2017 NCPDP  Original Claim Info  75 TRANS FEE = 0.00PRIOR AUTH REQUIRED/INVALID; PA REQUESTS: [id number] REQUIRES STEP THERAPY

## 2023-11-17 ENCOUNTER — OUTSIDE FACILITY SERVICE (OUTPATIENT)
Dept: GASTROENTEROLOGY | Facility: CLINIC | Age: 23
End: 2023-11-17
Payer: COMMERCIAL

## 2023-11-17 PROCEDURE — 45380 COLONOSCOPY AND BIOPSY: CPT | Performed by: INTERNAL MEDICINE

## 2023-11-17 PROCEDURE — 43239 EGD BIOPSY SINGLE/MULTIPLE: CPT | Performed by: INTERNAL MEDICINE

## 2023-11-17 PROCEDURE — 88305 TISSUE EXAM BY PATHOLOGIST: CPT

## 2023-11-20 ENCOUNTER — LAB REQUISITION (OUTPATIENT)
Dept: LAB | Facility: HOSPITAL | Age: 23
End: 2023-11-20
Payer: COMMERCIAL

## 2023-11-20 DIAGNOSIS — K25.9 GASTRIC ULCER, UNSPECIFIED AS ACUTE OR CHRONIC, WITHOUT HEMORRHAGE OR PERFORATION: ICD-10-CM

## 2023-11-20 DIAGNOSIS — R19.4 CHANGE IN BOWEL HABIT: ICD-10-CM

## 2023-11-20 DIAGNOSIS — K59.00 CONSTIPATION, UNSPECIFIED: ICD-10-CM

## 2023-11-20 DIAGNOSIS — R10.84 GENERALIZED ABDOMINAL PAIN: ICD-10-CM

## 2023-11-20 DIAGNOSIS — R11.0 NAUSEA: ICD-10-CM

## 2023-11-20 DIAGNOSIS — R19.7 DIARRHEA, UNSPECIFIED: ICD-10-CM

## 2023-11-20 DIAGNOSIS — K64.8 OTHER HEMORRHOIDS: ICD-10-CM

## 2023-11-20 DIAGNOSIS — R14.0 ABDOMINAL DISTENSION (GASEOUS): ICD-10-CM

## 2023-11-21 ENCOUNTER — TELEPHONE (OUTPATIENT)
Dept: GASTROENTEROLOGY | Facility: CLINIC | Age: 23
End: 2023-11-21
Payer: COMMERCIAL

## 2023-11-21 LAB — REF LAB TEST METHOD: NORMAL

## 2023-11-21 NOTE — TELEPHONE ENCOUNTER
I spoke to Alaina about the biopsies.  There is no evidence for H. pylori, celiac disease or colitis.  I will send her a handout on the FODMAP diet.

## 2023-12-31 ENCOUNTER — HOSPITAL ENCOUNTER (EMERGENCY)
Facility: HOSPITAL | Age: 23
Discharge: HOME | End: 2023-12-31
Attending: EMERGENCY MEDICINE | Admitting: EMERGENCY MEDICINE
Payer: COMMERCIAL

## 2023-12-31 ENCOUNTER — APPOINTMENT (OUTPATIENT)
Dept: GENERAL RADIOLOGY | Facility: HOSPITAL | Age: 23
End: 2023-12-31
Payer: COMMERCIAL

## 2023-12-31 VITALS
HEART RATE: 75 BPM | TEMPERATURE: 97.6 F | DIASTOLIC BLOOD PRESSURE: 61 MMHG | BODY MASS INDEX: 28.34 KG/M2 | HEIGHT: 62 IN | SYSTOLIC BLOOD PRESSURE: 114 MMHG | WEIGHT: 154 LBS | RESPIRATION RATE: 16 BRPM | OXYGEN SATURATION: 99 %

## 2023-12-31 DIAGNOSIS — E86.0 ACUTE DEHYDRATION: Primary | ICD-10-CM

## 2023-12-31 DIAGNOSIS — F12.90 MARIJUANA USE: ICD-10-CM

## 2023-12-31 DIAGNOSIS — R53.83 FATIGUE, UNSPECIFIED TYPE: ICD-10-CM

## 2023-12-31 LAB
ALBUMIN SERPL-MCNC: 3.9 G/DL (ref 3.5–5.2)
ALBUMIN/GLOB SERPL: 1.1 G/DL
ALP SERPL-CCNC: 101 U/L (ref 39–117)
ALT SERPL W P-5'-P-CCNC: 11 U/L (ref 1–33)
AMPHET+METHAMPHET UR QL: NEGATIVE
AMPHETAMINES UR QL: POSITIVE
ANION GAP SERPL CALCULATED.3IONS-SCNC: 9 MMOL/L (ref 5–15)
AST SERPL-CCNC: 16 U/L (ref 1–32)
B-HCG UR QL: NEGATIVE
BARBITURATES UR QL SCN: NEGATIVE
BASOPHILS # BLD AUTO: 0.04 10*3/MM3 (ref 0–0.2)
BASOPHILS NFR BLD AUTO: 0.3 % (ref 0–1.5)
BENZODIAZ UR QL SCN: NEGATIVE
BILIRUB SERPL-MCNC: 0.2 MG/DL (ref 0–1.2)
BILIRUB UR QL STRIP: NEGATIVE
BUN SERPL-MCNC: 11 MG/DL (ref 6–20)
BUN/CREAT SERPL: 13.6 (ref 7–25)
BUPRENORPHINE SERPL-MCNC: NEGATIVE NG/ML
CALCIUM SPEC-SCNC: 9.1 MG/DL (ref 8.6–10.5)
CANNABINOIDS SERPL QL: POSITIVE
CHLORIDE SERPL-SCNC: 104 MMOL/L (ref 98–107)
CLARITY UR: CLEAR
CO2 SERPL-SCNC: 27 MMOL/L (ref 22–29)
COCAINE UR QL: NEGATIVE
COLOR UR: YELLOW
CREAT SERPL-MCNC: 0.81 MG/DL (ref 0.57–1)
DEPRECATED RDW RBC AUTO: 41.9 FL (ref 37–54)
EGFRCR SERPLBLD CKD-EPI 2021: 104.8 ML/MIN/1.73
EOSINOPHIL # BLD AUTO: 0.25 10*3/MM3 (ref 0–0.4)
EOSINOPHIL NFR BLD AUTO: 1.7 % (ref 0.3–6.2)
ERYTHROCYTE [DISTWIDTH] IN BLOOD BY AUTOMATED COUNT: 12.9 % (ref 12.3–15.4)
FENTANYL UR-MCNC: NEGATIVE NG/ML
FLUAV RNA RESP QL NAA+PROBE: NOT DETECTED
FLUBV RNA RESP QL NAA+PROBE: NOT DETECTED
GLOBULIN UR ELPH-MCNC: 3.5 GM/DL
GLUCOSE SERPL-MCNC: 84 MG/DL (ref 65–99)
GLUCOSE UR STRIP-MCNC: NEGATIVE MG/DL
HCT VFR BLD AUTO: 38.8 % (ref 34–46.6)
HGB BLD-MCNC: 12.9 G/DL (ref 12–15.9)
HGB UR QL STRIP.AUTO: NEGATIVE
HOLD SPECIMEN: NORMAL
IMM GRANULOCYTES # BLD AUTO: 0.06 10*3/MM3 (ref 0–0.05)
IMM GRANULOCYTES NFR BLD AUTO: 0.4 % (ref 0–0.5)
KETONES UR QL STRIP: ABNORMAL
LEUKOCYTE ESTERASE UR QL STRIP.AUTO: NEGATIVE
LIPASE SERPL-CCNC: 17 U/L (ref 13–60)
LYMPHOCYTES # BLD AUTO: 3.04 10*3/MM3 (ref 0.7–3.1)
LYMPHOCYTES NFR BLD AUTO: 20.5 % (ref 19.6–45.3)
MCH RBC QN AUTO: 29.5 PG (ref 26.6–33)
MCHC RBC AUTO-ENTMCNC: 33.2 G/DL (ref 31.5–35.7)
MCV RBC AUTO: 88.8 FL (ref 79–97)
METHADONE UR QL SCN: NEGATIVE
MONOCYTES # BLD AUTO: 0.82 10*3/MM3 (ref 0.1–0.9)
MONOCYTES NFR BLD AUTO: 5.5 % (ref 5–12)
NEUTROPHILS NFR BLD AUTO: 10.65 10*3/MM3 (ref 1.7–7)
NEUTROPHILS NFR BLD AUTO: 71.6 % (ref 42.7–76)
NITRITE UR QL STRIP: NEGATIVE
NRBC BLD AUTO-RTO: 0 /100 WBC (ref 0–0.2)
OPIATES UR QL: NEGATIVE
OXYCODONE UR QL SCN: NEGATIVE
PCP UR QL SCN: NEGATIVE
PH UR STRIP.AUTO: 8 [PH] (ref 5–8)
PLATELET # BLD AUTO: 345 10*3/MM3 (ref 140–450)
PMV BLD AUTO: 9.9 FL (ref 6–12)
POTASSIUM SERPL-SCNC: 3.9 MMOL/L (ref 3.5–5.2)
PROT SERPL-MCNC: 7.4 G/DL (ref 6–8.5)
PROT UR QL STRIP: ABNORMAL
RBC # BLD AUTO: 4.37 10*6/MM3 (ref 3.77–5.28)
RSV RNA NPH QL NAA+NON-PROBE: NOT DETECTED
SARS-COV-2 RNA RESP QL NAA+PROBE: NOT DETECTED
SODIUM SERPL-SCNC: 140 MMOL/L (ref 136–145)
SP GR UR STRIP: 1.04 (ref 1–1.03)
TRICYCLICS UR QL SCN: NEGATIVE
TROPONIN T SERPL HS-MCNC: <6 NG/L
UROBILINOGEN UR QL STRIP: ABNORMAL
WBC NRBC COR # BLD AUTO: 14.86 10*3/MM3 (ref 3.4–10.8)
WHOLE BLOOD HOLD COAG: NORMAL
WHOLE BLOOD HOLD SPECIMEN: NORMAL

## 2023-12-31 PROCEDURE — 84484 ASSAY OF TROPONIN QUANT: CPT | Performed by: EMERGENCY MEDICINE

## 2023-12-31 PROCEDURE — 81025 URINE PREGNANCY TEST: CPT | Performed by: EMERGENCY MEDICINE

## 2023-12-31 PROCEDURE — 85025 COMPLETE CBC W/AUTO DIFF WBC: CPT | Performed by: EMERGENCY MEDICINE

## 2023-12-31 PROCEDURE — 87637 SARSCOV2&INF A&B&RSV AMP PRB: CPT | Performed by: EMERGENCY MEDICINE

## 2023-12-31 PROCEDURE — 71045 X-RAY EXAM CHEST 1 VIEW: CPT

## 2023-12-31 PROCEDURE — 93005 ELECTROCARDIOGRAM TRACING: CPT | Performed by: EMERGENCY MEDICINE

## 2023-12-31 PROCEDURE — 81003 URINALYSIS AUTO W/O SCOPE: CPT | Performed by: EMERGENCY MEDICINE

## 2023-12-31 PROCEDURE — 80307 DRUG TEST PRSMV CHEM ANLYZR: CPT | Performed by: EMERGENCY MEDICINE

## 2023-12-31 PROCEDURE — 99284 EMERGENCY DEPT VISIT MOD MDM: CPT

## 2023-12-31 PROCEDURE — 83690 ASSAY OF LIPASE: CPT | Performed by: EMERGENCY MEDICINE

## 2023-12-31 PROCEDURE — 80053 COMPREHEN METABOLIC PANEL: CPT | Performed by: EMERGENCY MEDICINE

## 2023-12-31 RX ORDER — SODIUM CHLORIDE 0.9 % (FLUSH) 0.9 %
10 SYRINGE (ML) INJECTION AS NEEDED
Status: DISCONTINUED | OUTPATIENT
Start: 2023-12-31 | End: 2023-12-31 | Stop reason: HOSPADM

## 2023-12-31 NOTE — Clinical Note
[facility] EMERGENCY DEPARTMENT  [address]  [facility] [address]  Phone: [phone]    Alaina Kennedy was seen and treated in our emergency department on 12/31/2023.  She may return to work on 01/02/2024.         Thank you for choosing [facility].    Black Montoya MD

## 2023-12-31 NOTE — ED PROVIDER NOTES
Subjective   History of Present Illness  23-year-old female who presents for evaluation of fatigue and dizziness.  She reports that she began to have the symptoms this morning.  She does admit that she drank alcohol last night.  She denies persistent nausea vomiting.  She denies diarrhea.  No blood in the stool.  She does report some suprapubic abdominal cramping that has been intermittent in nature.  She denies being pregnant.  She denies dysuria or frequency.  She does take some antidepressant medications but denies any change in those medications or change in the dose.  No definitive sick contacts reported.  She appears well nontoxic and in no acute distress.      Review of Systems   Constitutional:  Positive for fatigue. Negative for chills and fever.   HENT:  Negative for congestion, ear pain, postnasal drip, sinus pressure and sore throat.    Eyes:  Negative for pain, redness and visual disturbance.   Respiratory:  Negative for cough, chest tightness and shortness of breath.    Cardiovascular:  Negative for chest pain, palpitations and leg swelling.   Gastrointestinal:  Negative for abdominal pain, anal bleeding, blood in stool, diarrhea, nausea and vomiting.   Endocrine: Negative for polydipsia and polyuria.   Genitourinary:  Negative for difficulty urinating, dysuria, frequency and urgency.   Musculoskeletal:  Negative for arthralgias, back pain and neck pain.   Skin:  Negative for pallor and rash.   Allergic/Immunologic: Negative for environmental allergies and immunocompromised state.   Neurological:  Positive for dizziness and light-headedness. Negative for weakness and headaches.   Hematological:  Negative for adenopathy.   Psychiatric/Behavioral:  Negative for confusion, self-injury and suicidal ideas. The patient is not nervous/anxious.    All other systems reviewed and are negative.      Past Medical History:   Diagnosis Date    Anxiety     Chlamydia     a year ago, treated    Cholelithiasis      Depression     Gonorrhea     a year ago, treated    H. pylori infection        No Known Allergies    Past Surgical History:   Procedure Laterality Date    WISDOM TOOTH EXTRACTION         Family History   Problem Relation Age of Onset    Diabetes Maternal Grandfather        Social History     Socioeconomic History    Marital status: Single   Tobacco Use    Smoking status: Never    Smokeless tobacco: Never   Vaping Use    Vaping Use: Never used   Substance and Sexual Activity    Alcohol use: Yes     Comment: socially     Drug use: Not Currently     Types: Marijuana     Comment: last use July 2020    Sexual activity: Yes     Partners: Female, Male     Birth control/protection: None           Objective   Physical Exam  Vitals and nursing note reviewed.   Constitutional:       General: She is not in acute distress.     Appearance: Normal appearance. She is well-developed. She is not toxic-appearing or diaphoretic.   HENT:      Head: Normocephalic and atraumatic.      Right Ear: External ear normal.      Left Ear: External ear normal.      Nose: Nose normal.   Eyes:      General: Lids are normal.      Pupils: Pupils are equal, round, and reactive to light.   Neck:      Trachea: No tracheal deviation.   Cardiovascular:      Rate and Rhythm: Normal rate and regular rhythm.      Pulses: No decreased pulses.      Heart sounds: Normal heart sounds. No murmur heard.     No friction rub. No gallop.   Pulmonary:      Effort: Pulmonary effort is normal. No respiratory distress.      Breath sounds: Normal breath sounds. No decreased breath sounds, wheezing, rhonchi or rales.   Abdominal:      General: Bowel sounds are normal.      Palpations: Abdomen is soft.      Tenderness: There is no abdominal tenderness. There is no guarding or rebound.   Musculoskeletal:         General: No deformity. Normal range of motion.      Cervical back: Normal range of motion and neck supple.   Lymphadenopathy:      Cervical: No cervical adenopathy.    Skin:     General: Skin is warm and dry.      Findings: No rash.   Neurological:      Mental Status: She is alert and oriented to person, place, and time.      Cranial Nerves: No cranial nerve deficit.      Sensory: No sensory deficit.   Psychiatric:         Speech: Speech normal.         Behavior: Behavior normal.         Thought Content: Thought content normal.         Judgment: Judgment normal.         Procedures           ED Course                                             Medical Decision Making  Differential diagnosis includes alcohol use, illicit drug use, dehydration, urinary tract infection, renal insufficiency, electrolyte abnormality, viral respiratory infection, other unspecified etiology.    The patient's viral respiratory panel was negative for COVID, flu, and RSV.    Labs show normal kidney function with no significant electrolyte abnormalities.    The patient does have leukocytosis.    Urine shows signs of dehydration with elevated specific gravity but does not appear consistent with a urinary tract infection.    Troponin is normal.  Lipase is normal.  The patient's urine drug screen is positive for marijuana and methamphetamine.    Chest x-ray shows no acute abnormalities.    The patient has remained well-appearing throughout the ER course.    The patient denies methamphetamine use, I am unsure if this could have confidently been consumed along with marijuana use or if this is essentially a false positive.    The patient does report drinking alcohol last night and very well may have some dehydration.  I have recommended good fluid intake, avoidance of further drug use, and follow-up with primary care physician for outpatient evaluation.    Problems Addressed:  Acute dehydration: complicated acute illness or injury with systemic symptoms  Fatigue, unspecified type: complicated acute illness or injury with systemic symptoms  Marijuana use: complicated acute illness or injury    Amount and/or  Complexity of Data Reviewed  External Data Reviewed: labs, radiology and ECG.  Labs: ordered. Decision-making details documented in ED Course.  Radiology: ordered and independent interpretation performed. Decision-making details documented in ED Course.  ECG/medicine tests: ordered and independent interpretation performed. Decision-making details documented in ED Course.     Details: EKG independently interpreted by myself shows sinus rhythm without acute ischemic changes.    Risk  Prescription drug management.        Final diagnoses:   Acute dehydration   Fatigue, unspecified type   Marijuana use       ED Disposition  ED Disposition       ED Disposition   Discharge    Condition   Stable    Comment   --               Dianna Garcia DO  [address] [place] [address]  [phone]    In 1 week           Medication List      No changes were made to your prescriptions during this visit.            Black Montoya MD  01/01/24 5435

## 2024-01-03 ENCOUNTER — TELEPHONE (OUTPATIENT)
Dept: FAMILY MEDICINE CLINIC | Facility: CLINIC | Age: 24
End: 2024-01-03

## 2024-01-03 DIAGNOSIS — E78.5 HYPERLIPIDEMIA, UNSPECIFIED HYPERLIPIDEMIA TYPE: Primary | ICD-10-CM

## 2024-01-03 LAB
QT INTERVAL: 426 MS
QTC INTERVAL: 432 MS

## 2024-01-03 NOTE — TELEPHONE ENCOUNTER
Caller: Alaina Kennedy    Relationship: Self    Best call back number: [phone]         What specialty or service is being requested: DIETICIAN        Any additional details: PATIENT WOULD LIKE A REFERRAL FOR A DIETICIAN. PATIENT WANTS TO MAKE SURE THEY WILL TAKE HER INSURANCE.CALLBACK TO DISCUSS

## 2024-01-08 NOTE — TELEPHONE ENCOUNTER
Rx Refill Note  Requested Prescriptions     Pending Prescriptions Disp Refills    norgestimate-ethinyl estradiol (Ortho Tri-Cyclen, 28,) 0.18/0.215/0.25 MG-35 MCG per tablet 28 tablet 12     Sig: Take 1 tablet by mouth Daily.      Last office visit with prescribing clinician: 11/3/2023   Last telemedicine visit with prescribing clinician: Visit date not found   Next office visit with prescribing clinician: 2/19/2024                         Would you like a call back once the refill request has been completed: [] Yes [] No    If the office needs to give you a call back, can they leave a voicemail: [] Yes [] No    Tanja Zamora MA  01/08/24, 07:45 EST

## 2024-01-09 ENCOUNTER — HOSPITAL ENCOUNTER (OUTPATIENT)
Dept: NUTRITION | Facility: HOSPITAL | Age: 24
Setting detail: RECURRING SERIES
Discharge: HOME | End: 2024-01-09
Payer: COMMERCIAL

## 2024-01-09 PROCEDURE — 97802 MEDICAL NUTRITION INDIV IN: CPT

## 2024-01-09 RX ORDER — NORGESTIMATE AND ETHINYL ESTRADIOL 7DAYSX3 28
1 KIT ORAL DAILY
Qty: 28 TABLET | Refills: 5 | Status: SHIPPED | OUTPATIENT
Start: 2024-01-09 | End: 2025-01-08

## 2024-01-09 NOTE — CONSULTS
[facility] Nutrition Services          Initial 60 Minute Nutrition Visit    Date: 2024   Patient Name: Alaina Kennedy  : 2000   MRN: [id number]   Referring Provider: Dianna Garcia DO    Reason for Visit: Dx: HLD, but patient wanted education for IBS/low FODMAP  Visit Format: Telehealth    Nutrition Assessment       Social History:   Social History     Socioeconomic History    Marital status: Single   Tobacco Use    Smoking status: Never    Smokeless tobacco: Never   Vaping Use    Vaping Use: Never used   Substance and Sexual Activity    Alcohol use: Yes     Comment: socially     Drug use: Not Currently     Types: Marijuana     Comment: last use 2020    Sexual activity: Yes     Partners: Female, Male     Birth control/protection: None     Active Problem List:   Patient Active Problem List    Diagnosis     Pregnancy [Z34.90]     GERD (gastroesophageal reflux disease) [K21.9]     Other constipation [K59.09]     Acne vulgaris [L70.0]     Bacterial infection due to H. pylori [A04.8]     Annual physical exam [Z00.00]       Current Medications:   Current Outpatient Medications:     cetirizine (zyrTEC) 10 MG tablet, Take 1 tablet by mouth Daily., Disp: , Rfl:     cholecalciferol (Vitamin D) 25 MCG (1000 UT) tablet, Take 1 tablet by mouth Daily., Disp: 90 tablet, Rfl: 1    escitalopram (LEXAPRO) 20 MG tablet, Take 1 tablet by mouth Daily., Disp: , Rfl:     famotidine (PEPCID) 20 MG tablet, Take 1 tablet by mouth Daily., Disp: , Rfl:     fluticasone (FLONASE) 50 MCG/ACT nasal spray, 2 sprays into the nostril(s) as directed by provider Daily., Disp: , Rfl:     norgestimate-ethinyl estradiol (Ortho Tri-Cyclen, 28,) 0.18/0.215/0.25 MG-35 MCG per tablet, Take 1 tablet by mouth Daily., Disp: 28 tablet, Rfl: 12    OXcarbazepine (TRILEPTAL) 150 MG tablet, Take 1 tablet by mouth Daily., Disp: , Rfl:     Sodium Sulfate-Mag Sulfate-KCl (SUTAB) 3723-466-367 MG tablet, Take 24 tablets by mouth Take As  "Directed., Disp: 24 tablet, Rfl: 0    Recent Pertinent Labs: No recent labs    Hunger Vital Sign Food Insecurity Assessment:  Within the past 12 months I/we worried whether our food would run out before I/we got money to buy more: No   Within the past 12 months the food I/we bought just didn't last and I/we didn't have money to get more: No   Use of food assistance programs (WIC, food stamps, food rodríguez) No       Food & Nutrition Related History       Food Allergies: None noted  Food Intolerances: Several but specifics unknown at this time  Food Behavior: Patient often skips meals or eats very little d/t GI symptoms  Nutrition Impact Symptoms: nausea, diarrhea, constipation, bloating, GERD, abdominal pain  Gastrointestinal conditions that impact intake or food choices: GERD, IBS  Details at home: Lives with 2 children  Who prepares most meals: patient  Who does grocery shopping: patient  How many meals are purchased from fast food/sit down restaurants per week: unknown  Difficulty chewin - Normal  Difficulty swallowin - Normal  Diet requirement related to personal preference or cultural belief: none  History of eating disorder/disordered eating habits: patient reports she has some disordered eating history due to GI symptoms  Language/communication details: English  Barriers to learning: None    24 Hour Recall: Patient to send food log to RD  Additional comments: Patient reports she eats a lot of processed food, fast food, packaged food. She drinks 16 oz water/day    Anthropometrics      Height:   Ht Readings from Last 1 Encounters:   23 157.5 cm (62\")     Weight:   Wt Readings from Last 3 Encounters:   23 69.9 kg (154 lb)   23 73.2 kg (161 lb 6.4 oz)   23 70 kg (154 lb 6.4 oz)     BMI: There is no height or weight on file to calculate BMI.   Weight Change: None     Physical Activity     Barriers to physical activity: None     Physical activity comments: Did not discuss     Estimated " Needs     Estimated Energy Needs: 7731-9158 kcal (MSJ x 1.2)    Estimated Protein Needs: 20-25% daily calories     Estimated Fluid Needs: At least 64 oz/day     Discussion / Education      Alaina Kennedy is a 23 y.o. female who has been referred for HLD, but her primary concern is with IBS. Her primary motivation is to improve energy levels, improve symptoms, and improve mental health. Her primary barriers to change are lack of nutrition knowledge. She lives with children. Cooking and grocery shopping are done by patient. She dines out several times per week. She has not previously seen a dietitian/nutritionist.     RD reviewed IBS and low FODMAP diet with patient. Patient was unable to identify specific foods that trigger her symptoms. Symptoms include abdominal pain, bloating, discomfort, diarrhea, nausea, and constipation. RD recommended patient keep a food and symptom log and send to RD to review prior to next appointment. After 1 week of logging foods and symptoms, RD recommended initiating elimination phase of low FODMAP diet for 2-3 weeks. At next appointment, RD and patient will review reintroduction protocol and develop POC.    RD and patient worked to set several goals for patient. RD provided contact info and encouraged patient to reach out with any additional questions or concerns following the appointment.    Assessment of patient engagement: Engaged    Measurement of understanding: Patient verbalized understanding, Patient able to demonstrate understanding with teach back     Resources Provided:  Low FODMAP foods list  Low FODMAP basic  FODMAP everyday blog  [facility] blog    Goal (s)      Goal 1: Keep a food and symptom log through next appointment.     Goal 2: Increase fluid intake to 48 oz/day.      Plan of Care     PES Statement:   Food causing negative symptoms related to IBS as evidenced by gastrointestinal symptoms.     Follow Up Visit      Follow Up scheduled for 1/30 at 2 pm via  telehealth.    Total of 60 minutes spent with patient on nutrition counseling. Education based on Academy of Nutrition and Dietetics guidelines. Patient was provided with RD's contact information. Thank you for this referral.

## 2024-01-30 ENCOUNTER — HOSPITAL ENCOUNTER (OUTPATIENT)
Dept: NUTRITION | Facility: HOSPITAL | Age: 24
Setting detail: RECURRING SERIES
Discharge: HOME | End: 2024-01-30
Payer: COMMERCIAL

## 2024-01-30 NOTE — CONSULTS
[facility] Nutrition Services          Free 30 Minute Nutrition Follow Up     Date: 2024   Patient Name: Alaina Kennedy  : 2000   MRN: [id number]   Referring Provider: Dianna Garcia DO    Reason for Visit: HLD, patient's primary concern is IBS  Visit Format: Telehealth    Discussion / Education      Patient was seen today for follow up related to HLD and patient's concern of IBS. Patient has no new concerns for chewing or swallowing difficulties. Patient has no new concerns for gastrointestinal problems. Patient has no new concerns for food insecurity. Patient kept a detailed food log and discussed with RD during this appointment. RD and patient discussed possible trigger foods and alternatives for some high FODMAP food items. Patient enjoys logging foods and feels excited about finding solutions to GI issues.    RD advised:  - Reduce/eliminate garlic and onions as much as possible (garlic and onion powder is likely alright)  - Reduce/eliminate lactose as much as possible  - Avoid fatty foods (pizza, fast food, heavy dairy, etc)  - Reduce/eliminate soda as much as possible  - Keep food journal and symptom log - note possible trigger meals to review for similarities  - Keep drinking lots of water! (Goal of 64-80 oz/day)    Previous goals included:    Goal 1: Keep a food and symptom log through next appointment.   Goal 2: Increase fluid intake to 48 oz/day.      Patient has made good progress on her goals.    1. Patient kept food/symptom log with excellent detail.  2. Patient drinks 24-36 oz water some days.    Patient does desire continued follow up at this time. RD provided contact info and encouraged patient to reach out as needed for support.    Assessment of patient engagement: Engaged    Measurement of understanding: Patient verbalized understanding, Patient able to demonstrate understanding with teach back     Resources Provided:  No new resources provided    Goal (s)      1.  Reduce/eliminate garlic and onions as much as possible  2. Reduce/eliminate lactose as much as possible  3. Avoid fatty foods  4. Reduce/eliminate soda as much as possible  5. Keep food journal and symptom log - note possible trigger meals to review for similarities  6. Water 64-80 oz/day    Follow Up Visit      Follow Up scheduled for 3/6 @ 9:30 am via telehealth.    Total of 30 minutes spent with patient on nutrition counseling. Education based on Academy of Nutrition and Dietetics guidelines. Patient was provided with RD's contact information. Thank you for this referral.

## 2024-02-12 ENCOUNTER — LAB (OUTPATIENT)
Dept: LAB | Facility: HOSPITAL | Age: 24
End: 2024-02-12
Payer: COMMERCIAL

## 2024-02-12 ENCOUNTER — OFFICE VISIT (OUTPATIENT)
Dept: FAMILY MEDICINE CLINIC | Facility: CLINIC | Age: 24
End: 2024-02-12
Payer: COMMERCIAL

## 2024-02-12 VITALS
TEMPERATURE: 98.2 F | HEART RATE: 78 BPM | OXYGEN SATURATION: 98 % | SYSTOLIC BLOOD PRESSURE: 110 MMHG | BODY MASS INDEX: 28.56 KG/M2 | DIASTOLIC BLOOD PRESSURE: 64 MMHG | HEIGHT: 62 IN | WEIGHT: 155.2 LBS

## 2024-02-12 DIAGNOSIS — N90.89 LABIAL LESION: ICD-10-CM

## 2024-02-12 DIAGNOSIS — Z32.01 POSITIVE PREGNANCY TEST: Primary | ICD-10-CM

## 2024-02-12 DIAGNOSIS — Z20.2 POSSIBLE EXPOSURE TO STD: ICD-10-CM

## 2024-02-12 DIAGNOSIS — N89.8 VAGINAL DISCHARGE: ICD-10-CM

## 2024-02-12 DIAGNOSIS — Z32.01 POSITIVE PREGNANCY TEST: ICD-10-CM

## 2024-02-12 LAB
HAV IGM SERPL QL IA: NORMAL
HBV CORE IGM SERPL QL IA: NORMAL
HBV SURFACE AG SERPL QL IA: NORMAL
HCG INTACT+B SERPL-ACNC: 95.5 MIU/ML
HCV AB SER DONR QL: NORMAL
HIV 1+2 AB+HIV1 P24 AG SERPL QL IA: NORMAL
RPR SER QL: NORMAL

## 2024-02-12 PROCEDURE — 87591 N.GONORRHOEAE DNA AMP PROB: CPT | Performed by: NURSE PRACTITIONER

## 2024-02-12 PROCEDURE — 87186 SC STD MICRODIL/AGAR DIL: CPT

## 2024-02-12 PROCEDURE — 87491 CHLMYD TRACH DNA AMP PROBE: CPT | Performed by: NURSE PRACTITIONER

## 2024-02-12 PROCEDURE — 87661 TRICHOMONAS VAGINALIS AMPLIF: CPT | Performed by: NURSE PRACTITIONER

## 2024-02-12 PROCEDURE — 84702 CHORIONIC GONADOTROPIN TEST: CPT

## 2024-02-12 PROCEDURE — 87798 DETECT AGENT NOS DNA AMP: CPT | Performed by: NURSE PRACTITIONER

## 2024-02-12 PROCEDURE — 87147 CULTURE TYPE IMMUNOLOGIC: CPT

## 2024-02-12 PROCEDURE — 80074 ACUTE HEPATITIS PANEL: CPT

## 2024-02-12 PROCEDURE — 87254 VIRUS INOCULATION SHELL VIA: CPT

## 2024-02-12 PROCEDURE — 87801 DETECT AGNT MULT DNA AMPLI: CPT | Performed by: NURSE PRACTITIONER

## 2024-02-12 PROCEDURE — 87205 SMEAR GRAM STAIN: CPT

## 2024-02-12 PROCEDURE — 86592 SYPHILIS TEST NON-TREP QUAL: CPT

## 2024-02-12 PROCEDURE — 87255 GENET VIRUS ISOLATE HSV: CPT

## 2024-02-12 PROCEDURE — 99214 OFFICE O/P EST MOD 30 MIN: CPT | Performed by: NURSE PRACTITIONER

## 2024-02-12 PROCEDURE — G0432 EIA HIV-1/HIV-2 SCREEN: HCPCS

## 2024-02-12 PROCEDURE — 87070 CULTURE OTHR SPECIMN AEROBIC: CPT

## 2024-02-12 RX ORDER — PNV NO.95/FERROUS FUM/FOLIC AC 28MG-0.8MG
1 TABLET ORAL DAILY
Qty: 30 TABLET | Refills: 11 | Status: SHIPPED | OUTPATIENT
Start: 2024-02-12

## 2024-02-12 RX ORDER — ERGOCALCIFEROL 1.25 MG/1
1 CAPSULE ORAL WEEKLY
COMMUNITY
Start: 2023-12-14

## 2024-02-12 RX ORDER — ACYCLOVIR 50 MG/G
1 CREAM TOPICAL EVERY 6 HOURS PRN
Qty: 42 G | Refills: 1 | Status: SHIPPED | OUTPATIENT
Start: 2024-02-12

## 2024-02-12 RX ORDER — AZITHROMYCIN 500 MG/1
1 TABLET, FILM COATED ORAL DAILY
COMMUNITY
Start: 2023-12-20

## 2024-02-13 ENCOUNTER — TELEPHONE (OUTPATIENT)
Dept: FAMILY MEDICINE CLINIC | Facility: CLINIC | Age: 24
End: 2024-02-13

## 2024-02-13 NOTE — TELEPHONE ENCOUNTER
Name: Alaina Kennedy    Relationship: Self    Best Callback Number:       [phone] (Mobile)     HUB PROVIDED THE RELAY MESSAGE FROM THE OFFICE   PATIENT     HAS FURTHER QUESTIONS AND WOULD LIKE A CALL BACK AT THE FOLLOWING PHONE NUMBER      ADDITIONAL INFORMATION:     PATIENT REQUESTED A CALL BACK WITH EXPLANATION OF RANGE OF NUMBERS FROM THE PREGNANCY TEST

## 2024-02-13 NOTE — TELEPHONE ENCOUNTER
Caller: Alaina Kennedy    Relationship: Self    Best call back number: [phone]     Caller requesting test results: YES    What test was performed: LABS    When was the test performed: 2/12/24    Where was the test performed: [facility]     Additional notes: PATIENT HAS QUESTIONS REGARDING HER TEST RESULTS AND HER PREGNANCY TEST

## 2024-02-13 NOTE — TELEPHONE ENCOUNTER
"Hub please read: \" we called you back regarding your questions, when you call back if you could please leave your questions with the call center we will be glad to answer these for you, thanks\" lm for pt to call us back.  "

## 2024-02-15 DIAGNOSIS — A49.02 MRSA INFECTION: Primary | ICD-10-CM

## 2024-02-15 DIAGNOSIS — N90.89 LABIAL LESION: ICD-10-CM

## 2024-02-15 LAB
A VAGINAE DNA VAG QL NAA+PROBE: NORMAL SCORE
BACTERIA SPEC AEROBE CULT: ABNORMAL
BVAB2 DNA VAG QL NAA+PROBE: NORMAL SCORE
C ALBICANS DNA VAG QL NAA+PROBE: NEGATIVE
C GLABRATA DNA VAG QL NAA+PROBE: NEGATIVE
C TRACH DNA VAG QL NAA+PROBE: NEGATIVE
C TRACH RRNA SPEC QL NAA+PROBE: NEGATIVE
GRAM STN SPEC: ABNORMAL
MEGA1 DNA VAG QL NAA+PROBE: NORMAL SCORE
N GONORRHOEA DNA VAG QL NAA+PROBE: NEGATIVE
N GONORRHOEA RRNA SPEC QL NAA+PROBE: NEGATIVE
T VAGINALIS DNA VAG QL NAA+PROBE: NEGATIVE
T VAGINALIS RRNA SPEC QL NAA+PROBE: NEGATIVE

## 2024-02-16 NOTE — TELEPHONE ENCOUNTER
Hub staff attempted to follow warm transfer process and was unsuccessful     Caller: Alaina Kennedy    Relationship to patient: Self    Best call back number: [phone]     Patient is needing: PATIENT RECEIVED A  VOICEMAIL LAST NIGHT REGARDING TEST RESULTS.

## 2024-02-19 LAB
HSV SKIN CULT: NORMAL
VZV SPEC QL CULT: NORMAL

## 2024-02-28 ENCOUNTER — APPOINTMENT (OUTPATIENT)
Dept: ULTRASOUND IMAGING | Facility: HOSPITAL | Age: 24
End: 2024-02-28
Payer: COMMERCIAL

## 2024-02-28 ENCOUNTER — HOSPITAL ENCOUNTER (EMERGENCY)
Facility: HOSPITAL | Age: 24
Discharge: HOME | End: 2024-02-28
Attending: EMERGENCY MEDICINE | Admitting: EMERGENCY MEDICINE
Payer: COMMERCIAL

## 2024-02-28 VITALS
DIASTOLIC BLOOD PRESSURE: 68 MMHG | RESPIRATION RATE: 18 BRPM | HEART RATE: 65 BPM | HEIGHT: 62 IN | WEIGHT: 155 LBS | SYSTOLIC BLOOD PRESSURE: 114 MMHG | BODY MASS INDEX: 28.52 KG/M2 | TEMPERATURE: 98.6 F | OXYGEN SATURATION: 100 %

## 2024-02-28 DIAGNOSIS — O20.0 THREATENED MISCARRIAGE IN EARLY PREGNANCY: Primary | ICD-10-CM

## 2024-02-28 DIAGNOSIS — Z3A.01 5 WEEKS GESTATION OF PREGNANCY: ICD-10-CM

## 2024-02-28 LAB
ABO GROUP BLD: NORMAL
ALBUMIN SERPL-MCNC: 3.9 G/DL (ref 3.5–5.2)
ALBUMIN/GLOB SERPL: 1.2 G/DL
ALP SERPL-CCNC: 88 U/L (ref 39–117)
ALT SERPL W P-5'-P-CCNC: 23 U/L (ref 1–33)
ANION GAP SERPL CALCULATED.3IONS-SCNC: 11 MMOL/L (ref 5–15)
AST SERPL-CCNC: 26 U/L (ref 1–32)
B-HCG UR QL: POSITIVE
BACTERIA UR QL AUTO: ABNORMAL /HPF
BASOPHILS # BLD AUTO: 0.02 10*3/MM3 (ref 0–0.2)
BASOPHILS NFR BLD AUTO: 0.4 % (ref 0–1.5)
BILIRUB SERPL-MCNC: 0.2 MG/DL (ref 0–1.2)
BILIRUB UR QL STRIP: ABNORMAL
BUN SERPL-MCNC: 10 MG/DL (ref 6–20)
BUN/CREAT SERPL: 13.7 (ref 7–25)
CALCIUM SPEC-SCNC: 8.8 MG/DL (ref 8.6–10.5)
CHLORIDE SERPL-SCNC: 105 MMOL/L (ref 98–107)
CLARITY UR: ABNORMAL
CO2 SERPL-SCNC: 24 MMOL/L (ref 22–29)
COLOR UR: ABNORMAL
CREAT SERPL-MCNC: 0.73 MG/DL (ref 0.57–1)
DEPRECATED RDW RBC AUTO: 44.7 FL (ref 37–54)
EGFRCR SERPLBLD CKD-EPI 2021: 118.7 ML/MIN/1.73
EOSINOPHIL # BLD AUTO: 0.21 10*3/MM3 (ref 0–0.4)
EOSINOPHIL NFR BLD AUTO: 4.1 % (ref 0.3–6.2)
ERYTHROCYTE [DISTWIDTH] IN BLOOD BY AUTOMATED COUNT: 13.3 % (ref 12.3–15.4)
EXPIRATION DATE: ABNORMAL
GLOBULIN UR ELPH-MCNC: 3.3 GM/DL
GLUCOSE SERPL-MCNC: 82 MG/DL (ref 65–99)
GLUCOSE UR STRIP-MCNC: NEGATIVE MG/DL
HCG INTACT+B SERPL-ACNC: 453.3 MIU/ML
HCT VFR BLD AUTO: 39.1 % (ref 34–46.6)
HGB BLD-MCNC: 13.1 G/DL (ref 12–15.9)
HGB UR QL STRIP.AUTO: ABNORMAL
HYALINE CASTS UR QL AUTO: ABNORMAL /LPF
IMM GRANULOCYTES # BLD AUTO: 0 10*3/MM3 (ref 0–0.05)
IMM GRANULOCYTES NFR BLD AUTO: 0 % (ref 0–0.5)
INTERNAL NEGATIVE CONTROL: ABNORMAL
INTERNAL POSITIVE CONTROL: ABNORMAL
KETONES UR QL STRIP: ABNORMAL
LEUKOCYTE ESTERASE UR QL STRIP.AUTO: ABNORMAL
LIPASE SERPL-CCNC: 15 U/L (ref 13–60)
LYMPHOCYTES # BLD AUTO: 1.86 10*3/MM3 (ref 0.7–3.1)
LYMPHOCYTES NFR BLD AUTO: 36 % (ref 19.6–45.3)
Lab: ABNORMAL
MCH RBC QN AUTO: 30.5 PG (ref 26.6–33)
MCHC RBC AUTO-ENTMCNC: 33.5 G/DL (ref 31.5–35.7)
MCV RBC AUTO: 90.9 FL (ref 79–97)
MONOCYTES # BLD AUTO: 0.73 10*3/MM3 (ref 0.1–0.9)
MONOCYTES NFR BLD AUTO: 14.1 % (ref 5–12)
MUCOUS THREADS URNS QL MICRO: ABNORMAL /HPF
NEUTROPHILS NFR BLD AUTO: 2.35 10*3/MM3 (ref 1.7–7)
NEUTROPHILS NFR BLD AUTO: 45.4 % (ref 42.7–76)
NITRITE UR QL STRIP: NEGATIVE
NRBC BLD AUTO-RTO: 0 /100 WBC (ref 0–0.2)
NUMBER OF DOSES: NORMAL
PH UR STRIP.AUTO: 6 [PH] (ref 5–8)
PLATELET # BLD AUTO: 238 10*3/MM3 (ref 140–450)
PMV BLD AUTO: 10.1 FL (ref 6–12)
POTASSIUM SERPL-SCNC: 3.4 MMOL/L (ref 3.5–5.2)
PROT SERPL-MCNC: 7.2 G/DL (ref 6–8.5)
PROT UR QL STRIP: ABNORMAL
RBC # BLD AUTO: 4.3 10*6/MM3 (ref 3.77–5.28)
RBC # UR STRIP: ABNORMAL /HPF
REF LAB TEST METHOD: ABNORMAL
RH BLD: POSITIVE
SODIUM SERPL-SCNC: 140 MMOL/L (ref 136–145)
SP GR UR STRIP: 1.03 (ref 1–1.03)
SQUAMOUS #/AREA URNS HPF: ABNORMAL /HPF
UROBILINOGEN UR QL STRIP: ABNORMAL
WBC # UR STRIP: ABNORMAL /HPF
WBC NRBC COR # BLD AUTO: 5.17 10*3/MM3 (ref 3.4–10.8)

## 2024-02-28 PROCEDURE — 81001 URINALYSIS AUTO W/SCOPE: CPT | Performed by: NURSE PRACTITIONER

## 2024-02-28 PROCEDURE — 81025 URINE PREGNANCY TEST: CPT | Performed by: NURSE PRACTITIONER

## 2024-02-28 PROCEDURE — 83690 ASSAY OF LIPASE: CPT | Performed by: NURSE PRACTITIONER

## 2024-02-28 PROCEDURE — 99284 EMERGENCY DEPT VISIT MOD MDM: CPT

## 2024-02-28 PROCEDURE — 76817 TRANSVAGINAL US OBSTETRIC: CPT

## 2024-02-28 PROCEDURE — 85025 COMPLETE CBC W/AUTO DIFF WBC: CPT | Performed by: NURSE PRACTITIONER

## 2024-02-28 PROCEDURE — 86901 BLOOD TYPING SEROLOGIC RH(D): CPT | Performed by: NURSE PRACTITIONER

## 2024-02-28 PROCEDURE — 80053 COMPREHEN METABOLIC PANEL: CPT | Performed by: NURSE PRACTITIONER

## 2024-02-28 PROCEDURE — 36415 COLL VENOUS BLD VENIPUNCTURE: CPT

## 2024-02-28 PROCEDURE — 86900 BLOOD TYPING SEROLOGIC ABO: CPT | Performed by: NURSE PRACTITIONER

## 2024-02-28 PROCEDURE — 84702 CHORIONIC GONADOTROPIN TEST: CPT | Performed by: NURSE PRACTITIONER

## 2024-02-28 RX ORDER — ACETAMINOPHEN 500 MG
500 TABLET ORAL ONCE
Status: DISCONTINUED | OUTPATIENT
Start: 2024-02-28 | End: 2024-02-28 | Stop reason: HOSPADM

## 2024-02-28 RX ORDER — SODIUM CHLORIDE 0.9 % (FLUSH) 0.9 %
10 SYRINGE (ML) INJECTION AS NEEDED
Status: DISCONTINUED | OUTPATIENT
Start: 2024-02-28 | End: 2024-02-28 | Stop reason: HOSPADM

## 2024-02-28 NOTE — ED PROVIDER NOTES
Subjective   History of Present Illness  Pleasant patient presents the ER for lower abdominal cramping along with vaginal bleeding that she describes as spotting.  She tells me she is around 6 weeks pregnant.  This is her third pregnancy with no history of miscarriage.  She denies any chest pain or difficulty breathing.  She denies any fevers or chills.        Review of Systems   Constitutional:  Negative for chills, diaphoresis and fever.   HENT:  Negative for congestion and sore throat.    Respiratory:  Negative for cough, choking, chest tightness, shortness of breath and wheezing.    Cardiovascular:  Negative for chest pain and leg swelling.   Gastrointestinal:  Positive for abdominal pain. Negative for abdominal distention, anal bleeding, blood in stool, constipation, diarrhea, nausea and vomiting.   Genitourinary:  Negative for difficulty urinating, dysuria, flank pain, frequency, hematuria and urgency.   All other systems reviewed and are negative.      Past Medical History:   Diagnosis Date    Anxiety     Chlamydia     a year ago, treated    Cholelithiasis     Depression     Gonorrhea     a year ago, treated    H. pylori infection        No Known Allergies    Past Surgical History:   Procedure Laterality Date    WISDOM TOOTH EXTRACTION         Family History   Problem Relation Age of Onset    Diabetes Maternal Grandfather        Social History     Socioeconomic History    Marital status: Single   Tobacco Use    Smoking status: Never    Smokeless tobacco: Never   Vaping Use    Vaping Use: Never used   Substance and Sexual Activity    Alcohol use: Yes     Comment: socially     Drug use: Not Currently     Types: Marijuana     Comment: last use July 2020    Sexual activity: Yes     Partners: Female, Male     Birth control/protection: None           Objective   Physical Exam  Constitutional:       Appearance: She is well-developed.   HENT:      Head: Normocephalic and atraumatic.      Right Ear: External ear  normal.      Left Ear: External ear normal.      Nose: Nose normal.   Eyes:      Conjunctiva/sclera: Conjunctivae normal.      Pupils: Pupils are equal, round, and reactive to light.   Cardiovascular:      Rate and Rhythm: Normal rate and regular rhythm.      Heart sounds: Normal heart sounds.   Pulmonary:      Effort: Pulmonary effort is normal.      Breath sounds: Normal breath sounds.   Abdominal:      General: Bowel sounds are normal.      Palpations: Abdomen is soft.   Musculoskeletal:         General: Normal range of motion.      Cervical back: Normal range of motion and neck supple.   Skin:     General: Skin is warm and dry.   Neurological:      Mental Status: She is alert and oriented to person, place, and time.   Psychiatric:         Behavior: Behavior normal.         Thought Content: Thought content normal.         Judgment: Judgment normal.         Procedures           ED Course  ED Course as of 02/28/24 1723 Wed Feb 28, 2024   1632 RN trying find pt in the lobby.  [JM]   1721 Had a very long conversation with the patient and significant other at the bedside.  She does have an IUP noted on the ultrasound.  She has had some vaginal spotting as well.  Did advise her of the current findings.  I did discuss with him this is a very early pregnancy.  There is a possibility of a miscarriage as well.  She will need close follow-up with her OB/GYN.  She did mention Tracy Sandoval.  I do think she needs to speak to their office tomorrow for an earlier follow-up and repeat ultrasound and blood counts.  Pelvic rest.  Patient and significant other well aware the signs of worse condition. [JM]      ED Course User Index  [JM] Jose Kaminski APRN                                 US Ob Transvaginal   Final Result   Impression:   There is an intrauterine pregnancy with a gestational age of 5 weeks and 5 days by ultrasound. Fetal cardiac activity is not yet observed likely due to early gestational age. Short-term follow-up  repeat ultrasound is recommended to confirm viability            Electronically Signed: Sylvia Adamson MD     2/28/2024 3:58 PM EST     Workstation ID: [id number]                    Medical Decision Making  Amount and/or Complexity of Data Reviewed  Labs: ordered.  Radiology: ordered.    Risk  OTC drugs.  Prescription drug management.        Final diagnoses:   Threatened miscarriage in early pregnancy   5 weeks gestation of pregnancy       ED Disposition  ED Disposition       ED Disposition   Discharge    Condition   Stable    Comment   --               Jian Dowell, CN  [address] [address]  [phone]    Schedule an appointment as soon as possible for a visit       [facility] EMERGENCY DEPARTMENT  [address] [phone]    If symptoms worsen         Medication List      No changes were made to your prescriptions during this visit.            Jose Kaminski, APRN  02/28/24 1725

## 2024-02-29 ENCOUNTER — TELEPHONE (OUTPATIENT)
Dept: OBSTETRICS AND GYNECOLOGY | Facility: CLINIC | Age: 24
End: 2024-02-29
Payer: COMMERCIAL

## 2024-02-29 NOTE — TELEPHONE ENCOUNTER
Called patient denied pain or bleeding right now. Follow up on Monday with us and to see Regla choi TAB

## 2024-03-04 ENCOUNTER — OFFICE VISIT (OUTPATIENT)
Dept: OBSTETRICS AND GYNECOLOGY | Facility: CLINIC | Age: 24
End: 2024-03-04
Payer: COMMERCIAL

## 2024-03-04 VITALS — DIASTOLIC BLOOD PRESSURE: 74 MMHG | WEIGHT: 152.6 LBS | BODY MASS INDEX: 27.91 KG/M2 | SYSTOLIC BLOOD PRESSURE: 112 MMHG

## 2024-03-04 DIAGNOSIS — O02.1 MISSED ABORTION: Primary | ICD-10-CM

## 2024-03-04 PROCEDURE — 1160F RVW MEDS BY RX/DR IN RCRD: CPT | Performed by: NURSE PRACTITIONER

## 2024-03-04 PROCEDURE — 1159F MED LIST DOCD IN RCRD: CPT | Performed by: NURSE PRACTITIONER

## 2024-03-04 PROCEDURE — 99213 OFFICE O/P EST LOW 20 MIN: CPT | Performed by: NURSE PRACTITIONER

## 2024-03-04 NOTE — PROGRESS NOTES
Chief Complaint   Patient presents with    Threatened Miscarriage          HPI  Alaina Kennedy is a 23 y.o. female, , who presents with U/S without fetal heart tones.    Recent Tests:  She had a quantitative hCG pregnancy test that was done 1 week ago that was positive.     US today: Yes.  Findings showed no gestation sac, no cardiac activity. No retained POC. Endometrial thickness is 1.6 mm. I have reviewed the preliminary US report. Final report pending physician review. SERA Angela      She has not had prenatal care.  She reports some abdominal cramping. Her past medical history is non-contributory.  She has had passage of tissue. Her bleeding today is almost stopped.   Rh Status: Positive  She reports no additional symptoms or complaints.    The additional following portions of the patient's history were reviewed and updated as appropriate: allergies and current medications.    Review of Systems   Constitutional: Negative.    HENT: Negative.     Respiratory: Negative.     Cardiovascular: Negative.    Gastrointestinal: Negative.    Genitourinary: Negative.    Musculoskeletal: Negative.    Skin: Negative.    Allergic/Immunologic: Negative.    Neurological: Negative.    Hematological: Negative.    Psychiatric/Behavioral: Negative.       All other systems reviewed and are negative.     I have reviewed and agree with the HPI, ROS, and historical information as entered above. Perialfreda CHILDERS Darren, APRN      Objective   /74   Wt 69.2 kg (152 lb 9.6 oz)   LMP 01/15/2024 (Exact Date)   BMI 27.91 kg/m²     Physical Exam  Vitals and nursing note reviewed.   Constitutional:       Appearance: Normal appearance. She is well-developed.   HENT:      Head: Normocephalic and atraumatic.   Cardiovascular:      Rate and Rhythm: Normal rate and regular rhythm.   Pulmonary:      Effort: Pulmonary effort is normal.      Breath sounds: Normal breath sounds.   Abdominal:      Palpations: Abdomen  is soft. Abdomen is not rigid.   Musculoskeletal:      Cervical back: Normal range of motion.   Neurological:      Mental Status: She is alert and oriented to person, place, and time.   Psychiatric:         Behavior: Behavior normal.            Assessment and Plan    Problem List Items Addressed This Visit    None  Visit Diagnoses       Missed     -  Primary            Missed AB US findings reviewed with pt and partner and all questions answered.   Pelvic Rest.  No douching, intercourse or use of tampons.  Call for an increase in bleeding, abdominal pain, or fever.  Report to ED if saturating a pad greater than every 30-60 mins.  Return for Annual physical.          Odalys Banks, APRN  2024

## 2024-03-12 ENCOUNTER — TELEPHONE (OUTPATIENT)
Dept: OBSTETRICS AND GYNECOLOGY | Facility: CLINIC | Age: 24
End: 2024-03-12
Payer: COMMERCIAL

## 2024-03-12 NOTE — TELEPHONE ENCOUNTER
Returned patient's call. Was seen here 3/4/24; SAB confirmed by ultrasound. Asking for refill of OCPs. She was given Rx for OCPs by her PCP on 1/9/24; #28 with 5 refills. Informed her she should have refills available on that Rx but needs to wait until she starts a normal period after the SAB to start the OCPs and will need to use another method of protection for the 1st month. She v/u.

## 2024-05-15 NOTE — TELEPHONE ENCOUNTER
Caller: Alaina Kennedy    Relationship: Self    Best call back number: [phone]    What is the best time to reach you: ANYTIME    Who are you requesting to speak with (clinical staff, provider,  specific staff member): CLINICAL STAFF    Do you know the name of the person who called: SELF    What was the call regarding: PATIENT STATES THAT THE PRENATALS THAT WAS CALLED INTO HER PHARMACY ARE NOT COVERED BY HER INSURANCE. PATIENT WAS CALLING TO SEE IF SHE CAN GET A PRENATAL PILL CALLED IN THAT HER INSURANCE WILL COVER.    Do you require a callback: YES          
Called patient and relayed message. Pt states she no longer needs prenatal called in she bought some OTC.  
Sure. There are several different brands of prenatal. Please have her ask the pharmacy which her insurance is covering and I will call it in.   
Fall Risk

## 2024-06-03 ENCOUNTER — HOSPITAL ENCOUNTER (EMERGENCY)
Facility: HOSPITAL | Age: 24
Discharge: HOME | End: 2024-06-04
Attending: EMERGENCY MEDICINE | Admitting: EMERGENCY MEDICINE
Payer: COMMERCIAL

## 2024-06-03 DIAGNOSIS — M79.10 MYALGIA: Primary | ICD-10-CM

## 2024-06-03 DIAGNOSIS — R20.2 PARESTHESIAS: ICD-10-CM

## 2024-06-03 DIAGNOSIS — R51.9 ACUTE NONINTRACTABLE HEADACHE, UNSPECIFIED HEADACHE TYPE: ICD-10-CM

## 2024-06-03 LAB
B-HCG UR QL: NEGATIVE
BASOPHILS # BLD AUTO: 0.02 10*3/MM3 (ref 0–0.2)
BASOPHILS NFR BLD AUTO: 0.2 % (ref 0–1.5)
BILIRUB UR QL STRIP: NEGATIVE
CLARITY UR: CLEAR
COLOR UR: YELLOW
DEPRECATED RDW RBC AUTO: 44.2 FL (ref 37–54)
EOSINOPHIL # BLD AUTO: 0.29 10*3/MM3 (ref 0–0.4)
EOSINOPHIL NFR BLD AUTO: 3.6 % (ref 0.3–6.2)
ERYTHROCYTE [DISTWIDTH] IN BLOOD BY AUTOMATED COUNT: 13.2 % (ref 12.3–15.4)
EXPIRATION DATE: NORMAL
GLUCOSE UR STRIP-MCNC: NEGATIVE MG/DL
HCT VFR BLD AUTO: 37.1 % (ref 34–46.6)
HGB BLD-MCNC: 12.4 G/DL (ref 12–15.9)
HGB UR QL STRIP.AUTO: NEGATIVE
IMM GRANULOCYTES # BLD AUTO: 0.01 10*3/MM3 (ref 0–0.05)
IMM GRANULOCYTES NFR BLD AUTO: 0.1 % (ref 0–0.5)
INTERNAL NEGATIVE CONTROL: NEGATIVE
INTERNAL POSITIVE CONTROL: POSITIVE
KETONES UR QL STRIP: NEGATIVE
LEUKOCYTE ESTERASE UR QL STRIP.AUTO: NEGATIVE
LYMPHOCYTES # BLD AUTO: 3.48 10*3/MM3 (ref 0.7–3.1)
LYMPHOCYTES NFR BLD AUTO: 43.1 % (ref 19.6–45.3)
Lab: NORMAL
MCH RBC QN AUTO: 30 PG (ref 26.6–33)
MCHC RBC AUTO-ENTMCNC: 33.4 G/DL (ref 31.5–35.7)
MCV RBC AUTO: 89.6 FL (ref 79–97)
MONOCYTES # BLD AUTO: 0.66 10*3/MM3 (ref 0.1–0.9)
MONOCYTES NFR BLD AUTO: 8.2 % (ref 5–12)
NEUTROPHILS NFR BLD AUTO: 3.62 10*3/MM3 (ref 1.7–7)
NEUTROPHILS NFR BLD AUTO: 44.8 % (ref 42.7–76)
NITRITE UR QL STRIP: NEGATIVE
PH UR STRIP.AUTO: 6 [PH] (ref 5–8)
PLATELET # BLD AUTO: 253 10*3/MM3 (ref 140–450)
PMV BLD AUTO: 10.4 FL (ref 6–12)
PROT UR QL STRIP: NEGATIVE
RBC # BLD AUTO: 4.14 10*6/MM3 (ref 3.77–5.28)
S PYO AG THROAT QL: NEGATIVE
SP GR UR STRIP: >=1.03 (ref 1–1.03)
UROBILINOGEN UR QL STRIP: NORMAL
WBC NRBC COR # BLD AUTO: 8.08 10*3/MM3 (ref 3.4–10.8)

## 2024-06-03 PROCEDURE — 87880 STREP A ASSAY W/OPTIC: CPT | Performed by: PHYSICIAN ASSISTANT

## 2024-06-03 PROCEDURE — 81003 URINALYSIS AUTO W/O SCOPE: CPT | Performed by: PHYSICIAN ASSISTANT

## 2024-06-03 PROCEDURE — 82550 ASSAY OF CK (CPK): CPT | Performed by: EMERGENCY MEDICINE

## 2024-06-03 PROCEDURE — 99284 EMERGENCY DEPT VISIT MOD MDM: CPT

## 2024-06-03 PROCEDURE — 84439 ASSAY OF FREE THYROXINE: CPT | Performed by: EMERGENCY MEDICINE

## 2024-06-03 PROCEDURE — 87081 CULTURE SCREEN ONLY: CPT | Performed by: PHYSICIAN ASSISTANT

## 2024-06-03 PROCEDURE — 81025 URINE PREGNANCY TEST: CPT | Performed by: PHYSICIAN ASSISTANT

## 2024-06-03 PROCEDURE — 36415 COLL VENOUS BLD VENIPUNCTURE: CPT

## 2024-06-03 PROCEDURE — 80050 GENERAL HEALTH PANEL: CPT | Performed by: EMERGENCY MEDICINE

## 2024-06-03 PROCEDURE — 87637 SARSCOV2&INF A&B&RSV AMP PRB: CPT | Performed by: PHYSICIAN ASSISTANT

## 2024-06-03 RX ORDER — SERTRALINE HYDROCHLORIDE 25 MG/1
25 TABLET, FILM COATED ORAL DAILY
COMMUNITY

## 2024-06-03 NOTE — Clinical Note
[facility] EMERGENCY DEPARTMENT [place]  [address]  [place] [address]  Phone: [phone]  Fax: [phone]    Alaina Kennedy was seen and treated in our emergency department on 6/3/2024.  She may return to work on 06/05/2024.         Thank you for choosing [facility].    Amarjit Tinajero MD

## 2024-06-04 ENCOUNTER — APPOINTMENT (OUTPATIENT)
Facility: HOSPITAL | Age: 24
End: 2024-06-04
Payer: COMMERCIAL

## 2024-06-04 VITALS
SYSTOLIC BLOOD PRESSURE: 93 MMHG | OXYGEN SATURATION: 100 % | BODY MASS INDEX: 28.52 KG/M2 | DIASTOLIC BLOOD PRESSURE: 65 MMHG | HEIGHT: 62 IN | TEMPERATURE: 98.4 F | RESPIRATION RATE: 16 BRPM | WEIGHT: 155 LBS | HEART RATE: 66 BPM

## 2024-06-04 LAB
ALBUMIN SERPL-MCNC: 3.9 G/DL (ref 3.5–5.2)
ALBUMIN/GLOB SERPL: 1.3 G/DL
ALP SERPL-CCNC: 108 U/L (ref 39–117)
ALT SERPL W P-5'-P-CCNC: 17 U/L (ref 1–33)
ANION GAP SERPL CALCULATED.3IONS-SCNC: 9.2 MMOL/L (ref 5–15)
AST SERPL-CCNC: 24 U/L (ref 1–32)
BILIRUB SERPL-MCNC: 0.2 MG/DL (ref 0–1.2)
BUN SERPL-MCNC: 18 MG/DL (ref 6–20)
BUN/CREAT SERPL: 20 (ref 7–25)
CALCIUM SPEC-SCNC: 8.8 MG/DL (ref 8.6–10.5)
CHLORIDE SERPL-SCNC: 104 MMOL/L (ref 98–107)
CK SERPL-CCNC: 140 U/L (ref 20–180)
CO2 SERPL-SCNC: 27.8 MMOL/L (ref 22–29)
CREAT SERPL-MCNC: 0.9 MG/DL (ref 0.57–1)
EGFRCR SERPLBLD CKD-EPI 2021: 92.3 ML/MIN/1.73
FLUAV RNA RESP QL NAA+PROBE: NOT DETECTED
FLUBV RNA RESP QL NAA+PROBE: NOT DETECTED
GLOBULIN UR ELPH-MCNC: 2.9 GM/DL
GLUCOSE SERPL-MCNC: 61 MG/DL (ref 65–99)
POTASSIUM SERPL-SCNC: 3.8 MMOL/L (ref 3.5–5.2)
PROT SERPL-MCNC: 6.8 G/DL (ref 6–8.5)
RSV RNA RESP QL NAA+PROBE: NOT DETECTED
SARS-COV-2 RNA RESP QL NAA+PROBE: NOT DETECTED
SODIUM SERPL-SCNC: 141 MMOL/L (ref 136–145)
T4 FREE SERPL-MCNC: 1.21 NG/DL (ref 0.92–1.68)
TSH SERPL DL<=0.05 MIU/L-ACNC: 2.66 UIU/ML (ref 0.27–4.2)

## 2024-06-04 PROCEDURE — 25810000003 SODIUM CHLORIDE 0.9 % SOLUTION: Performed by: EMERGENCY MEDICINE

## 2024-06-04 PROCEDURE — 70450 CT HEAD/BRAIN W/O DYE: CPT

## 2024-06-04 RX ORDER — SODIUM CHLORIDE 0.9 % (FLUSH) 0.9 %
10 SYRINGE (ML) INJECTION AS NEEDED
Status: DISCONTINUED | OUTPATIENT
Start: 2024-06-04 | End: 2024-06-04 | Stop reason: HOSPADM

## 2024-06-04 RX ADMIN — SODIUM CHLORIDE 1000 ML: 9 INJECTION, SOLUTION INTRAVENOUS at 00:37

## 2024-06-04 NOTE — FSED PROVIDER NOTE
"Subjective  History of Present Illness:    Patient presents the emergency department with lower extremity myalgias, right upper extremity numbness and left lower extremity numbness.  The patient reports a left-sided headache.  The patient reports muscle spasms.  Patient reports easy bruising but reports that this is longstanding.  Patient made an appointment with her primary care doctor but was advised to come to the emergency department if her symptoms worsen.      Nurses Notes reviewed and agree, including vitals, allergies, social history and prior medical history.     REVIEW OF SYSTEMS: All systems reviewed and not pertinent unless noted.  Review of Systems   Musculoskeletal:  Positive for myalgias.   Neurological:  Positive for numbness and headaches.   Hematological:  Bruises/bleeds easily.       Past Medical History:   Diagnosis Date    Anxiety     Chlamydia     a year ago, treated    Cholelithiasis     Depression     Gonorrhea     a year ago, treated    H. pylori infection        Allergies:    Patient has no known allergies.      Past Surgical History:   Procedure Laterality Date    WISDOM TOOTH EXTRACTION           Social History     Socioeconomic History    Marital status: Single   Tobacco Use    Smoking status: Never    Smokeless tobacco: Never   Vaping Use    Vaping status: Never Used   Substance and Sexual Activity    Alcohol use: Yes     Comment: socially     Drug use: Not Currently     Types: Marijuana     Comment: last use July 2020    Sexual activity: Yes     Partners: Female, Male     Birth control/protection: None         Family History   Problem Relation Age of Onset    Diabetes Maternal Grandfather        Objective  Physical Exam:  BP 93/65   Pulse 66   Temp 98.4 °F (36.9 °C) (Oral)   Resp 16   Ht 157.5 cm (62\")   Wt 70.3 kg (155 lb)   LMP 05/20/2024 (Approximate)   SpO2 100%   BMI 28.35 kg/m²      Physical Exam  Vitals and nursing note reviewed.   Constitutional:       Appearance: " Normal appearance.   HENT:      Right Ear: External ear normal.      Left Ear: External ear normal.      Nose: Nose normal.      Mouth/Throat:      Mouth: Mucous membranes are moist.   Eyes:      Extraocular Movements: Extraocular movements intact.   Cardiovascular:      Rate and Rhythm: Normal rate and regular rhythm.   Pulmonary:      Effort: Pulmonary effort is normal.      Breath sounds: Normal breath sounds.   Musculoskeletal:         General: Normal range of motion.   Skin:     General: Skin is warm and dry.      Findings: Bruising present.   Neurological:      General: No focal deficit present.      Mental Status: She is alert and oriented to person, place, and time.      GCS: GCS eye subscore is 4. GCS verbal subscore is 5. GCS motor subscore is 6.      Cranial Nerves: Cranial nerves 2-12 are intact.      Sensory: Sensation is intact.      Motor: Motor function is intact. No weakness.      Deep Tendon Reflexes: Reflexes are normal and symmetric.   Psychiatric:         Mood and Affect: Mood normal.         Behavior: Behavior normal.         Thought Content: Thought content normal.         Procedures    ED Course:         Lab Results (last 24 hours)       Procedure Component Value Units Date/Time    Urinalysis With Microscopic If Indicated (No Culture) - Urine, Clean Catch [[id number]]  (Normal) Collected: 06/03/24 2326    Specimen: Urine, Clean Catch Updated: 06/03/24 2335     Color, UA Yellow     Appearance, UA Clear     pH, UA 6.0     Specific Gravity, UA >=1.030     Glucose, UA Negative     Ketones, UA Negative     Bilirubin, UA Negative     Blood, UA Negative     Protein, UA Negative     Leuk Esterase, UA Negative     Nitrite, UA Negative     Urobilinogen, UA 0.2 E.U./dL    Narrative:      Urine microscopic not indicated.    CBC Auto Differential [[id number]]  (Abnormal) Collected: 06/03/24 2326    Specimen: Blood Updated: 06/03/24 2335     WBC 8.08 10*3/mm3      RBC 4.14 10*6/mm3      Hemoglobin 12.4 g/dL       Hematocrit 37.1 %      MCV 89.6 fL      MCH 30.0 pg      MCHC 33.4 g/dL      RDW 13.2 %      RDW-SD 44.2 fl      MPV 10.4 fL      Platelets 253 10*3/mm3      Neutrophil % 44.8 %      Lymphocyte % 43.1 %      Monocyte % 8.2 %      Eosinophil % 3.6 %      Basophil % 0.2 %      Immature Grans % 0.1 %      Neutrophils, Absolute 3.62 10*3/mm3      Lymphocytes, Absolute 3.48 10*3/mm3      Monocytes, Absolute 0.66 10*3/mm3      Eosinophils, Absolute 0.29 10*3/mm3      Basophils, Absolute 0.02 10*3/mm3      Immature Grans, Absolute 0.01 10*3/mm3     Comprehensive Metabolic Panel [[id number]]  (Abnormal) Collected: 06/03/24 2326    Specimen: Blood Updated: 06/04/24 0001     Glucose 61 mg/dL      BUN 18 mg/dL      Creatinine 0.90 mg/dL      Sodium 141 mmol/L      Potassium 3.8 mmol/L      Chloride 104 mmol/L      CO2 27.8 mmol/L      Calcium 8.8 mg/dL      Total Protein 6.8 g/dL      Albumin 3.9 g/dL      ALT (SGPT) 17 U/L      AST (SGOT) 24 U/L      Alkaline Phosphatase 108 U/L      Total Bilirubin 0.2 mg/dL      Globulin 2.9 gm/dL      A/G Ratio 1.3 g/dL      BUN/Creatinine Ratio 20.0     Anion Gap 9.2 mmol/L      eGFR 92.3 mL/min/1.73     Narrative:      GFR Normal >60  Chronic Kidney Disease <60  Kidney Failure <15      CK [[id number]]  (Normal) Collected: 06/03/24 2326    Specimen: Blood Updated: 06/04/24 0041     Creatine Kinase 140 U/L     TSH [[id number]]  (Normal) Collected: 06/03/24 2326    Specimen: Blood Updated: 06/04/24 0048     TSH 2.660 uIU/mL     T4, Free [[id number]]  (Normal) Collected: 06/03/24 2326    Specimen: Blood Updated: 06/04/24 0049     Free T4 1.21 ng/dL     COVID PRE-OP / PRE-PROCEDURE SCREENING ORDER (NO ISOLATION) - Swab, Nasopharynx [[id number]]  (Normal) Collected: 06/03/24 2327    Specimen: Swab from Nasopharynx Updated: 06/04/24 0012    Narrative:      The following orders were created for panel order COVID PRE-OP / PRE-PROCEDURE SCREENING ORDER (NO ISOLATION) - Swab, Nasopharynx.  Procedure                                Abnormality         Status                     ---------                               -----------         ------                     COVID-19, FLU A/B, RSV P...[[id number]]  Normal              Final result                 Please view results for these tests on the individual orders.    Rapid Strep A Screen - Swab, Throat [[id number]]  (Normal) Collected: 06/03/24 2327    Specimen: Swab from Throat Updated: 06/03/24 2344     Strep A Ag Negative    COVID-19, FLU A/B, RSV PCR 1 HR TAT - Swab, Nasopharynx [[id number]]  (Normal) Collected: 06/03/24 2327    Specimen: Swab from Nasopharynx Updated: 06/04/24 0012     COVID19 Not Detected     Influenza A PCR Not Detected     Influenza B PCR Not Detected     RSV, PCR Not Detected    Narrative:      Fact sheet for providers: https://www.fda.gov/media/251943/download    Fact sheet for patients: https://www.fda.gov/media/619253/download    Test performed by PCR.    Beta Strep Culture, Throat - Swab, Throat [[id number]] Collected: 06/03/24 2327    Specimen: Swab from Throat Updated: 06/03/24 2344    POC Urine Pregnancy [[id number]]  (Normal) Collected: 06/03/24 2330    Specimen: Urine Updated: 06/03/24 2330     HCG, Urine, QL Negative     Lot Number [id number]     Internal Positive Control Positive     Internal Negative Control Negative     Expiration Date 01/28/2025             CT Head Without Contrast    Result Date: 6/4/2024  CT HEAD WO CONTRAST Date of Exam: 6/4/2024 12:55 AM EDT Indication: headache. Comparison: 11/1/2023. Technique: Axial CT images were obtained of the head without contrast administration.  Automated exposure control and iterative construction methods were used. Findings: There is no evidence of hemorrhage. There is no mass effect or midline shift. There is no extracerebral collection. Ventricles are normal in size and configuration for patient's stated age.  Posterior fossa is within normal limits. Calvarium and skull base appear  intact.   Visualized sinuses show no air fluid levels. Visualized orbits are unremarkable.     Impression: Impression: No acute intracranial process. Electronically Signed: Mera Richards MD  6/4/2024 1:11 AM EDT  Workstation ID: [id number]        [facility]     Amount and/or Complexity of Data Reviewed  Clinical lab tests: reviewed        Initial impression of presenting illness: Paresthesias and myalgias    DDX: includes but is not limited to: Guillain-Barré, myositis, influenza    Patient arrives ambulatory with vitals interpreted by myself.     Pertinent features from physical exam:  Mild ecchymosis.    Initial diagnostic plan: Blood work and imaging    Results from initial plan were reviewed and interpreted by me revealing nonactionable lab results    Diagnostic information from other sources:     Interventions / Re-evaluation:     Medications   sodium chloride 0.9 % flush 10 mL (has no administration in time range)   sodium chloride 0.9 % bolus 1,000 mL (0 mL Intravenous Stopped 6/4/24 0157)       Results/clinical rationale were discussed with patient    Consultations/Discussion of results with other physicians:     Data interpreted: Nursing notes reviewed, vital signs reviewed.  CBC with differential, CMP, and HCG CT of the head reviewed independently interpreted by me.  EKG independently interpreted by me.  O2 saturation:    Counseling: Discussed the results above with the patient regarding need for discharged home. Return precautions were given to patient.  Patient understands and agrees plan of care.      -----  ED Disposition       ED Disposition   Discharge    Condition   Stable    Comment   --             Final diagnoses:   Myalgia   Paresthesias   Acute nonintractable headache, unspecified headache type      Your Follow-Up Providers       Dianna Garcia DO In 1 day.    Specialty: Family Medicine  Follow up details: as scheduled  [address] [place] [id number] [phone]                        Contact information for after-discharge care    Follow-up information has not been specified.                    Your medication list        CONTINUE taking these medications        Instructions Last Dose Given Next Dose Due   sertraline 25 MG tablet  Commonly known as: ZOLOFT      Take 1 tablet by mouth Daily.

## 2024-06-06 ENCOUNTER — LAB (OUTPATIENT)
Dept: LAB | Facility: HOSPITAL | Age: 24
End: 2024-06-06
Payer: COMMERCIAL

## 2024-06-06 ENCOUNTER — OFFICE VISIT (OUTPATIENT)
Dept: FAMILY MEDICINE CLINIC | Facility: CLINIC | Age: 24
End: 2024-06-06
Payer: COMMERCIAL

## 2024-06-06 VITALS
RESPIRATION RATE: 20 BRPM | BODY MASS INDEX: 27.05 KG/M2 | SYSTOLIC BLOOD PRESSURE: 104 MMHG | DIASTOLIC BLOOD PRESSURE: 60 MMHG | WEIGHT: 147 LBS | OXYGEN SATURATION: 99 % | HEART RATE: 70 BPM | HEIGHT: 62 IN | TEMPERATURE: 98.4 F

## 2024-06-06 DIAGNOSIS — G43.809 OTHER MIGRAINE WITHOUT STATUS MIGRAINOSUS, NOT INTRACTABLE: ICD-10-CM

## 2024-06-06 DIAGNOSIS — R25.2 LEG CRAMPS: ICD-10-CM

## 2024-06-06 DIAGNOSIS — M54.9 BACK PAIN, UNSPECIFIED BACK LOCATION, UNSPECIFIED BACK PAIN LATERALITY, UNSPECIFIED CHRONICITY: ICD-10-CM

## 2024-06-06 DIAGNOSIS — G43.809 OTHER MIGRAINE WITHOUT STATUS MIGRAINOSUS, NOT INTRACTABLE: Primary | ICD-10-CM

## 2024-06-06 DIAGNOSIS — Z23 IMMUNIZATION DUE: ICD-10-CM

## 2024-06-06 DIAGNOSIS — Z00.00 ANNUAL PHYSICAL EXAM: ICD-10-CM

## 2024-06-06 LAB
25(OH)D3 SERPL-MCNC: 22.6 NG/ML (ref 30–100)
BACTERIA SPEC AEROBE CULT: NORMAL
CHROMATIN AB SERPL-ACNC: <10 IU/ML (ref 0–14)
HCG SERPL QL: NEGATIVE
MAGNESIUM SERPL-MCNC: 2 MG/DL (ref 1.6–2.6)
VIT B12 BLD-MCNC: 376 PG/ML (ref 211–946)

## 2024-06-06 PROCEDURE — 84703 CHORIONIC GONADOTROPIN ASSAY: CPT

## 2024-06-06 PROCEDURE — 36415 COLL VENOUS BLD VENIPUNCTURE: CPT

## 2024-06-06 PROCEDURE — 90651 9VHPV VACCINE 2/3 DOSE IM: CPT | Performed by: STUDENT IN AN ORGANIZED HEALTH CARE EDUCATION/TRAINING PROGRAM

## 2024-06-06 PROCEDURE — 82306 VITAMIN D 25 HYDROXY: CPT

## 2024-06-06 PROCEDURE — 1126F AMNT PAIN NOTED NONE PRSNT: CPT | Performed by: STUDENT IN AN ORGANIZED HEALTH CARE EDUCATION/TRAINING PROGRAM

## 2024-06-06 PROCEDURE — 83735 ASSAY OF MAGNESIUM: CPT

## 2024-06-06 PROCEDURE — 86038 ANTINUCLEAR ANTIBODIES: CPT

## 2024-06-06 PROCEDURE — 82607 VITAMIN B-12: CPT

## 2024-06-06 PROCEDURE — 86431 RHEUMATOID FACTOR QUANT: CPT

## 2024-06-06 PROCEDURE — 99214 OFFICE O/P EST MOD 30 MIN: CPT | Performed by: STUDENT IN AN ORGANIZED HEALTH CARE EDUCATION/TRAINING PROGRAM

## 2024-06-06 PROCEDURE — 90471 IMMUNIZATION ADMIN: CPT | Performed by: STUDENT IN AN ORGANIZED HEALTH CARE EDUCATION/TRAINING PROGRAM

## 2024-06-06 PROCEDURE — 86592 SYPHILIS TEST NON-TREP QUAL: CPT

## 2024-06-06 RX ORDER — NICOTINE 21 MG/24HR
1 PATCH, TRANSDERMAL 24 HOURS TRANSDERMAL EVERY 24 HOURS
Qty: 30 PATCH | Refills: 11 | Status: SHIPPED | OUTPATIENT
Start: 2024-06-06

## 2024-06-06 NOTE — LETTER
[facility]  Vaccine Consent Form    Patient Name:  Alaina Kennedy  Patient :  2000        Screening Checklist  The following questions should be completed prior to vaccination. If you answer “yes” to any question, it does not necessarily mean you should not be vaccinated. It just means we may need to clarify or ask more questions. If a question is unclear, please ask your healthcare provider to explain it.    Yes No   Any fever or moderate to severe illness today (mild illness and/or antibiotic treatment are not contraindications)?     Do you have a history of a serious reaction to any previous vaccinations, such as anaphylaxis, encephalopathy within 7 days, Guillain-Camp [place] syndrome within 6 weeks, seizure?     Have you received any live vaccine(s) (e.g MMR, VIANNEY) or any other vaccines in the last month (to ensure duplicate doses aren't given)?     Do you have an anaphylactic allergy to latex (DTaP, DTaP-IPV, Hep A, Hep B, MenB, RV, Td, Tdap), baker’s yeast (Hep B, HPV), polysorbates (RSV, nirsevimab, PCV 20, Rotavirrus, Tdap, Shingrix), or gelatin (VIANNEY, MMR)?     Do you have an anaphylactic allergy to neomycin (Rabies, VIANNEY, MMR, IPV, Hep A), polymyxin B (IPV), or streptomycin (IPV)?      Any cancer, leukemia, AIDS, or other immune system disorder? (VIANNEY, MMR, RV)     Do you have a parent, brother, or sister with an immune system problem (if immune competence of vaccine recipient clinically verified, can proceed)? (MMR, VIANNEY)     Any recent steroid treatments for >2 weeks, chemotherapy, or radiation treatment? (VIANNEY, MMR)     Have you received antibody-containing blood transfusions or IVIG in the past 11 months (recommended interval is dependent on product)? (MMR, VIANNEY)     Have you taken antiviral drugs (acyclovir, famciclovir, valacyclovir for VIANNEY) in the last 24 or 48 hours, respectively?      Are you pregnant or planning to become pregnant within 1 month? (VIANNEY, MMR, HPV, IPV, MenB, Abrexvy; For Hep B-  "refer to Engerix-B; For RSV - Abrysvo is indicated for 32-36 weeks of pregnancy from September to January)     For infants, have you ever been told your child has had intussusception or a medical emergency involving obstruction of the intestine (Rotavirus)? If not for an infant, can skip this question.         *Ordering Physicians/APC should be consulted if \"yes\" is checked by the patient or guardian above.  I have received, read, and understand the Vaccine Information Statement (VIS) for each vaccine ordered.  I have considered my or my child's health status as well as the health status of my close contacts.  I have taken the opportunity to discuss my vaccine questions with my or my child's health care provider.   I have requested that the ordered vaccine(s) be given to me or my child.  I understand the benefits and risks of the vaccines.  I understand that I should remain in the clinic for 15 minutes after receiving the vaccine(s).  _________________________________________________________  Signature of Patient or Parent/Legal Guardian ____________________  Date     "

## 2024-06-06 NOTE — PROGRESS NOTES
"Chief Complaint  Back Pain (Pt has been having lower back pain and sharp pain down the spine. Pt states she also gets muscle spasms and feels like her legs are locking up. )    Back Pain  Associated symptoms include headaches.   Headache        Back pain  Mid back. Randomly her arm and leg will go numb and she has been having bad migraines worse on left side. No vision changes.   She has noticed this associated with vaping. This is worst headache of life.   She feels neither back pain or migraine are present today. She prefers to avoid pain meds.         The following portions of the patient's history were reviewed and updated as appropriate: allergies, current medications, past family history, past medical history, past social history, past surgical history, and problem list.    OBJECTIVE:  /60   Pulse 70   Temp 98.4 °F (36.9 °C) (Temporal)   Resp 20   Ht 157.5 cm (62.01\")   Wt 66.7 kg (147 lb)   SpO2 99%   BMI 26.88 kg/m²       Physical Exam  Constitutional:       General: She is not in acute distress.     Appearance: Normal appearance.   HENT:      Head: Normocephalic and atraumatic.   Eyes:      Extraocular Movements: Extraocular movements intact.   Cardiovascular:      Rate and Rhythm: Normal rate and regular rhythm.      Heart sounds: No murmur heard.  Pulmonary:      Effort: Pulmonary effort is normal. No respiratory distress.      Breath sounds: Normal breath sounds. No stridor. No wheezing, rhonchi or rales.   Skin:     Findings: No rash.   Neurological:      General: No focal deficit present.      Mental Status: She is alert.      Cranial Nerves: No cranial nerve deficit.      Motor: No weakness (normal muscle strength).   Psychiatric:         Mood and Affect: Mood normal.                  Assessment and Plan   Diagnoses and all orders for this visit:    1. Other migraine without status migrainosus, not intractable (Primary)  -     MRI Brain With & Without Contrast; Future  -     hCG, Serum, " Qualitative; Future    2. Back pain, unspecified back location, unspecified back pain laterality, unspecified chronicity  -     RPR; Future  -     CRIS; Future  -     Rheumatoid Factor, Quant; Future  -     XR Spine Thoracic 3 View (In Office)  -     XR Spine Lumbar 2 or 3 View (In Office)  -     Ambulatory Referral to Physical Therapy    3. Leg cramps  -     Vitamin D,25-Hydroxy; Future  -     Magnesium; Future  -     Vitamin B12; Future    4. Annual physical exam  -     HPV 9-Valent Recomb Vaccine suspension 0.5 mL    Other orders  -     nicotine (NICODERM CQ) 14 MG/24HR patch; Place 1 patch on the skin as directed by provider Daily.  Dispense: 30 patch; Refill: 11      Worsening migraines with numbness  Check mri brain to rule out MS.   Check labs above.     Check xray spine.   Will refer to pt. Ibuprofen prn.         Counseled against vaping.   She does use nicotine vaping.   We will start nrt to help her get off vaping.   Counseled to not take nrt if pregnant.     Increase hydration.     Return in about 1 month (around 7/6/2024) for Annual.       Dianna Garcia D.O.  [facility]

## 2024-06-07 LAB
ANA SER QL: NEGATIVE
RPR SER QL: NORMAL

## 2024-06-08 ENCOUNTER — TELEPHONE (OUTPATIENT)
Dept: FAMILY MEDICINE CLINIC | Facility: CLINIC | Age: 24
End: 2024-06-08
Payer: COMMERCIAL

## 2024-06-08 NOTE — TELEPHONE ENCOUNTER
----- Message from Dianna Garcia sent at 6/7/2024  5:01 PM EDT -----  Please let the patient know her imaging shows a mild scoliosis. This is typically treated with pt and ibuprofen otc as needed.

## 2024-06-08 NOTE — TELEPHONE ENCOUNTER
HUB RELAY    Please let the patient know her imaging shows a mild scoliosis. This is typically treated with pt and ibuprofen otc as needed.

## 2024-07-29 ENCOUNTER — LAB (OUTPATIENT)
Dept: LAB | Facility: HOSPITAL | Age: 24
End: 2024-07-29
Payer: COMMERCIAL

## 2024-07-29 ENCOUNTER — OFFICE VISIT (OUTPATIENT)
Dept: FAMILY MEDICINE CLINIC | Facility: CLINIC | Age: 24
End: 2024-07-29
Payer: COMMERCIAL

## 2024-07-29 VITALS
DIASTOLIC BLOOD PRESSURE: 64 MMHG | HEART RATE: 84 BPM | TEMPERATURE: 98.2 F | WEIGHT: 146 LBS | HEIGHT: 62 IN | SYSTOLIC BLOOD PRESSURE: 106 MMHG | OXYGEN SATURATION: 98 % | BODY MASS INDEX: 26.87 KG/M2

## 2024-07-29 DIAGNOSIS — Z11.3 SCREENING EXAMINATION FOR SEXUALLY TRANSMITTED DISEASE: ICD-10-CM

## 2024-07-29 DIAGNOSIS — Z11.3 SCREENING EXAMINATION FOR SEXUALLY TRANSMITTED DISEASE: Primary | ICD-10-CM

## 2024-07-29 DIAGNOSIS — R11.0 NAUSEA: ICD-10-CM

## 2024-07-29 DIAGNOSIS — E55.9 VITAMIN D DEFICIENCY: ICD-10-CM

## 2024-07-29 PROCEDURE — G0432 EIA HIV-1/HIV-2 SCREEN: HCPCS | Performed by: PHYSICIAN ASSISTANT

## 2024-07-29 PROCEDURE — 99214 OFFICE O/P EST MOD 30 MIN: CPT | Performed by: PHYSICIAN ASSISTANT

## 2024-07-29 PROCEDURE — 87591 N.GONORRHOEAE DNA AMP PROB: CPT | Performed by: PHYSICIAN ASSISTANT

## 2024-07-29 PROCEDURE — 86704 HEP B CORE ANTIBODY TOTAL: CPT | Performed by: PHYSICIAN ASSISTANT

## 2024-07-29 PROCEDURE — 1159F MED LIST DOCD IN RCRD: CPT | Performed by: PHYSICIAN ASSISTANT

## 2024-07-29 PROCEDURE — 36415 COLL VENOUS BLD VENIPUNCTURE: CPT | Performed by: PHYSICIAN ASSISTANT

## 2024-07-29 PROCEDURE — 86592 SYPHILIS TEST NON-TREP QUAL: CPT

## 2024-07-29 PROCEDURE — 81025 URINE PREGNANCY TEST: CPT | Performed by: PHYSICIAN ASSISTANT

## 2024-07-29 PROCEDURE — 86803 HEPATITIS C AB TEST: CPT | Performed by: PHYSICIAN ASSISTANT

## 2024-07-29 PROCEDURE — 1126F AMNT PAIN NOTED NONE PRSNT: CPT | Performed by: PHYSICIAN ASSISTANT

## 2024-07-29 PROCEDURE — 1160F RVW MEDS BY RX/DR IN RCRD: CPT | Performed by: PHYSICIAN ASSISTANT

## 2024-07-29 PROCEDURE — 86706 HEP B SURFACE ANTIBODY: CPT | Performed by: PHYSICIAN ASSISTANT

## 2024-07-29 PROCEDURE — 87491 CHLMYD TRACH DNA AMP PROBE: CPT | Performed by: PHYSICIAN ASSISTANT

## 2024-07-29 PROCEDURE — 87661 TRICHOMONAS VAGINALIS AMPLIF: CPT | Performed by: PHYSICIAN ASSISTANT

## 2024-07-29 PROCEDURE — 87340 HEPATITIS B SURFACE AG IA: CPT | Performed by: PHYSICIAN ASSISTANT

## 2024-07-29 RX ORDER — ERGOCALCIFEROL 1.25 MG/1
50000 CAPSULE ORAL WEEKLY
Qty: 12 CAPSULE | Refills: 1 | Status: SHIPPED | OUTPATIENT
Start: 2024-07-29

## 2024-07-29 RX ORDER — ARIPIPRAZOLE 5 MG/1
5 TABLET ORAL NIGHTLY
COMMUNITY
Start: 2024-07-18

## 2024-07-29 NOTE — PROGRESS NOTES
"     Follow Up Office Visit      Patient Name: Alaina Kennedy  : 2000   MRN: [id number]     Chief Complaint:    Chief Complaint   Patient presents with    Exposure to STD     Per patient she has a new partner and is just wanting to be checked for STD's.     Nausea     Patient is requesting a pregnancy test.        History of Present Illness: Alaina Kennedy is a 23 y.o. female who is here today requesting screening for sexually transmitted diseases.  Patient states she has a new intimate partner and \"just wants to make sure\".  States she is wanting screening for gonorrhea, chlamydia, trichomonas, hepatitis, syphilis, and HIV.  She denies any symptoms associated such as abnormal vaginal discharge, odor, lower urinary tract symptoms, vaginal discomfort, abdominal pain, fever, chills.  She also states she started Abilify x 1 week ago.  She states she feels her mood is stabilizing, though she has been experiencing significant nausea in the mornings.  She is requesting a pregnancy test today.  Patient also requesting a prescription for vitamin D supplementation.      I have reviewed and the following portions of the patient's history were updated as appropriate: past family history, past medical history, past social history, past surgical history and problem list.    Medications:     Current Outpatient Medications:     ARIPiprazole (ABILIFY) 5 MG tablet, Take 1 tablet by mouth Every Night., Disp: , Rfl:     sertraline (ZOLOFT) 25 MG tablet, Take 1 tablet by mouth Daily., Disp: , Rfl:     vitamin D (ERGOCALCIFEROL) 1.25 MG (98194 UT) capsule capsule, Take 1 capsule by mouth 1 (One) Time Per Week., Disp: 12 capsule, Rfl: 1    Allergies:   No Known Allergies    Objective     Physical Exam:   Physical Exam  Constitutional:       General: She is not in acute distress.     Appearance: She is not ill-appearing.   HENT:      Head: Normocephalic.   Cardiovascular:      Rate and Rhythm: Normal rate and regular rhythm.    " "  Heart sounds: No murmur heard.  Pulmonary:      Effort: Pulmonary effort is normal. No respiratory distress.      Breath sounds: Normal breath sounds.   Musculoskeletal:         General: Normal range of motion.   Skin:     General: Skin is warm.   Neurological:      General: No focal deficit present.      Mental Status: She is alert.   Psychiatric:         Mood and Affect: Mood normal.         Vital Signs:   Vitals:    07/29/24 1321   BP: 106/64   Pulse: 84   Temp: 98.2 °F (36.8 °C)   TempSrc: Infrared   SpO2: 98%   Weight: 66.2 kg (146 lb)   Height: 157.5 cm (62.01\")   PainSc: 0-No pain     Body mass index is 26.7 kg/m².    Procedures    Assessment / Plan         Assessment and Plan     Diagnoses and all orders for this visit:    1. Screening examination for sexually transmitted disease (Primary)  -     HCV Antibody Rfx To Qnt PCR  -     Hepatitis B Virus (HBV) Screening and Diagnosis  -     HIV-1 / O / 2 Ag / Antibody  -     RPR Qualitative with Reflex to Quant; Future  -     Cancel: Chlamydia trachomatis, Neisseria gonorrhoeae, Trichomonas vaginalis, PCR - Urine, Urine, Clean Catch; Future  -     Chlamydia trachomatis, Neisseria gonorrhoeae, Trichomonas vaginalis, PCR - Urine, Urine, Clean Catch; Future  -     Chlamydia trachomatis, Neisseria gonorrhoeae, Trichomonas vaginalis, PCR - Urine, Urine, Clean Catch    2. Nausea  -     POC Pregnancy, Urine    3. Vitamin D deficiency  -     vitamin D (ERGOCALCIFEROL) 1.25 MG (63250 UT) capsule capsule; Take 1 capsule by mouth 1 (One) Time Per Week.  Dispense: 12 capsule; Refill: 1    Will obtain screening labs including G/C/T, syphilis, hepatitis, and HIV.  Pregnancy test negative in office today.  Suspect nausea is associated with patient's new medication.  Encourage patient to take medication with food as nausea improves after eating.  Vitamin D supplement sent upon patient request.  Patient will follow-up next month for Pap and third HPV vaccination       Follow Up: "   Return in about 1 month (around 8/29/2024) for pap, f/u w/ Dr. Garcia.    Jovanna Navarrete PA-C    [facility] [place]

## 2024-07-30 LAB — RPR SER QL: NORMAL

## 2024-07-31 LAB
HBV CORE AB SERPL QL IA: NEGATIVE
HBV SURFACE AB SER QL: NON REACTIVE
HBV SURFACE AG SERPL QL IA: NEGATIVE
HCV AB SERPL QL IA: NORMAL
HCV IGG SERPL QL IA: NON REACTIVE
HIV 1+2 AB+HIV1 P24 AG SERPL QL IA: NORMAL
IMP & REVIEW OF LAB RESULTS: NORMAL
LABORATORY COMMENT REPORT: NORMAL

## 2024-08-01 LAB
C TRACH RRNA SPEC QL NAA+PROBE: NEGATIVE
N GONORRHOEA RRNA SPEC QL NAA+PROBE: NEGATIVE
T VAGINALIS RRNA SPEC QL NAA+PROBE: NEGATIVE

## 2024-09-10 ENCOUNTER — OFFICE VISIT (OUTPATIENT)
Dept: FAMILY MEDICINE CLINIC | Facility: CLINIC | Age: 24
End: 2024-09-10
Payer: COMMERCIAL

## 2024-09-10 ENCOUNTER — LAB (OUTPATIENT)
Dept: LAB | Facility: HOSPITAL | Age: 24
End: 2024-09-10
Payer: COMMERCIAL

## 2024-09-10 VITALS
WEIGHT: 143 LBS | TEMPERATURE: 98.7 F | BODY MASS INDEX: 26.31 KG/M2 | HEART RATE: 70 BPM | SYSTOLIC BLOOD PRESSURE: 100 MMHG | HEIGHT: 62 IN | OXYGEN SATURATION: 98 % | DIASTOLIC BLOOD PRESSURE: 58 MMHG

## 2024-09-10 DIAGNOSIS — Z32.00 POSSIBLE PREGNANCY, NOT CONFIRMED: ICD-10-CM

## 2024-09-10 DIAGNOSIS — R05.9 COUGH, UNSPECIFIED TYPE: Primary | ICD-10-CM

## 2024-09-10 DIAGNOSIS — Z11.3 SCREEN FOR STD (SEXUALLY TRANSMITTED DISEASE): ICD-10-CM

## 2024-09-10 DIAGNOSIS — L72.9 INFECTED CYST OF SKIN: ICD-10-CM

## 2024-09-10 DIAGNOSIS — L08.9 INFECTED CYST OF SKIN: ICD-10-CM

## 2024-09-10 DIAGNOSIS — J06.9 UPPER RESPIRATORY TRACT INFECTION, UNSPECIFIED TYPE: ICD-10-CM

## 2024-09-10 DIAGNOSIS — Z12.4 SCREENING FOR CERVICAL CANCER: ICD-10-CM

## 2024-09-10 LAB
B-HCG UR QL: NEGATIVE
EXPIRATION DATE: NORMAL
FLUAV AG NPH QL: NEGATIVE
FLUBV AG NPH QL: NEGATIVE
HCV AB SER QL: NORMAL
HIV 1+2 AB+HIV1 P24 AG SERPL QL IA: NORMAL
INTERNAL CONTROL: NORMAL
INTERNAL NEGATIVE CONTROL: NORMAL
INTERNAL POSITIVE CONTROL: NORMAL
Lab: NORMAL
S PYO AG THROAT QL: NEGATIVE
SARS-COV-2 AG UPPER RESP QL IA.RAPID: NOT DETECTED

## 2024-09-10 PROCEDURE — 87491 CHLMYD TRACH DNA AMP PROBE: CPT | Performed by: PHYSICIAN ASSISTANT

## 2024-09-10 PROCEDURE — 86803 HEPATITIS C AB TEST: CPT

## 2024-09-10 PROCEDURE — 87798 DETECT AGENT NOS DNA AMP: CPT | Performed by: PHYSICIAN ASSISTANT

## 2024-09-10 PROCEDURE — 99395 PREV VISIT EST AGE 18-39: CPT | Performed by: PHYSICIAN ASSISTANT

## 2024-09-10 PROCEDURE — 86592 SYPHILIS TEST NON-TREP QUAL: CPT

## 2024-09-10 PROCEDURE — 81025 URINE PREGNANCY TEST: CPT | Performed by: PHYSICIAN ASSISTANT

## 2024-09-10 PROCEDURE — 2014F MENTAL STATUS ASSESS: CPT | Performed by: PHYSICIAN ASSISTANT

## 2024-09-10 PROCEDURE — 87529 HSV DNA AMP PROBE: CPT | Performed by: PHYSICIAN ASSISTANT

## 2024-09-10 PROCEDURE — G0432 EIA HIV-1/HIV-2 SCREEN: HCPCS

## 2024-09-10 PROCEDURE — 87591 N.GONORRHOEAE DNA AMP PROB: CPT | Performed by: PHYSICIAN ASSISTANT

## 2024-09-10 PROCEDURE — 87661 TRICHOMONAS VAGINALIS AMPLIF: CPT | Performed by: PHYSICIAN ASSISTANT

## 2024-09-10 PROCEDURE — 87880 STREP A ASSAY W/OPTIC: CPT | Performed by: PHYSICIAN ASSISTANT

## 2024-09-10 PROCEDURE — 87426 SARSCOV CORONAVIRUS AG IA: CPT | Performed by: PHYSICIAN ASSISTANT

## 2024-09-10 PROCEDURE — 1126F AMNT PAIN NOTED NONE PRSNT: CPT | Performed by: PHYSICIAN ASSISTANT

## 2024-09-10 PROCEDURE — 36415 COLL VENOUS BLD VENIPUNCTURE: CPT

## 2024-09-10 PROCEDURE — 1159F MED LIST DOCD IN RCRD: CPT | Performed by: PHYSICIAN ASSISTANT

## 2024-09-10 PROCEDURE — 87801 DETECT AGNT MULT DNA AMPLI: CPT | Performed by: PHYSICIAN ASSISTANT

## 2024-09-10 PROCEDURE — 87804 INFLUENZA ASSAY W/OPTIC: CPT | Performed by: PHYSICIAN ASSISTANT

## 2024-09-10 PROCEDURE — 1160F RVW MEDS BY RX/DR IN RCRD: CPT | Performed by: PHYSICIAN ASSISTANT

## 2024-09-10 RX ORDER — FLUTICASONE PROPIONATE 50 UG/1
SPRAY, METERED NASAL
COMMUNITY
Start: 2024-09-06

## 2024-09-10 RX ORDER — LORATADINE/PSEUDOEPHEDRINE 10MG-240MG
1 TABLET, EXTENDED RELEASE 24 HR ORAL DAILY
COMMUNITY
Start: 2024-09-06

## 2024-09-10 RX ORDER — PREDNISONE 20 MG/1
2 TABLET ORAL DAILY
COMMUNITY
Start: 2024-09-06

## 2024-09-11 LAB — RPR SER QL: NORMAL

## 2024-09-12 LAB
A VAGINAE DNA VAG QL NAA+PROBE: NORMAL SCORE
BVAB2 DNA VAG QL NAA+PROBE: NORMAL SCORE
C ALBICANS DNA VAG QL NAA+PROBE: NEGATIVE
C GLABRATA DNA VAG QL NAA+PROBE: NEGATIVE
MEGA1 DNA VAG QL NAA+PROBE: NORMAL SCORE

## 2024-09-16 LAB
C TRACH RRNA SPEC QL NAA+PROBE: NEGATIVE
HSV1 DNA SPEC QL NAA+PROBE: NEGATIVE
HSV2 DNA SPEC QL NAA+PROBE: NEGATIVE
N GONORRHOEA RRNA SPEC QL NAA+PROBE: NEGATIVE
T VAGINALIS RRNA SPEC QL NAA+PROBE: NEGATIVE

## 2024-09-20 LAB — REF LAB TEST METHOD: NORMAL

## 2024-09-28 ENCOUNTER — APPOINTMENT (OUTPATIENT)
Facility: HOSPITAL | Age: 24
End: 2024-09-28
Payer: COMMERCIAL

## 2024-09-28 ENCOUNTER — HOSPITAL ENCOUNTER (EMERGENCY)
Facility: HOSPITAL | Age: 24
Discharge: HOME | End: 2024-09-28
Attending: EMERGENCY MEDICINE
Payer: COMMERCIAL

## 2024-09-28 VITALS
DIASTOLIC BLOOD PRESSURE: 89 MMHG | RESPIRATION RATE: 18 BRPM | TEMPERATURE: 98.4 F | WEIGHT: 145 LBS | HEART RATE: 77 BPM | OXYGEN SATURATION: 100 % | SYSTOLIC BLOOD PRESSURE: 107 MMHG | HEIGHT: 62 IN | BODY MASS INDEX: 26.68 KG/M2

## 2024-09-28 DIAGNOSIS — R07.9 CHEST PAIN, UNSPECIFIED TYPE: Primary | ICD-10-CM

## 2024-09-28 LAB
ALBUMIN SERPL-MCNC: 3.8 G/DL (ref 3.5–5.2)
ALBUMIN/GLOB SERPL: 1.2 G/DL
ALP SERPL-CCNC: 108 U/L (ref 39–117)
ALT SERPL W P-5'-P-CCNC: 15 U/L (ref 1–33)
ANION GAP SERPL CALCULATED.3IONS-SCNC: 12 MMOL/L (ref 5–15)
AST SERPL-CCNC: 20 U/L (ref 1–32)
BASOPHILS # BLD AUTO: 0.02 10*3/MM3 (ref 0–0.2)
BASOPHILS NFR BLD AUTO: 0.2 % (ref 0–1.5)
BILIRUB SERPL-MCNC: 0.3 MG/DL (ref 0–1.2)
BUN SERPL-MCNC: 14 MG/DL (ref 6–20)
BUN/CREAT SERPL: 16.9 (ref 7–25)
CALCIUM SPEC-SCNC: 8.9 MG/DL (ref 8.6–10.5)
CHLORIDE SERPL-SCNC: 104 MMOL/L (ref 98–107)
CO2 SERPL-SCNC: 24 MMOL/L (ref 22–29)
CREAT SERPL-MCNC: 0.83 MG/DL (ref 0.57–1)
DEPRECATED RDW RBC AUTO: 42.5 FL (ref 37–54)
EGFRCR SERPLBLD CKD-EPI 2021: 101.7 ML/MIN/1.73
EOSINOPHIL # BLD AUTO: 0.26 10*3/MM3 (ref 0–0.4)
EOSINOPHIL NFR BLD AUTO: 3 % (ref 0.3–6.2)
ERYTHROCYTE [DISTWIDTH] IN BLOOD BY AUTOMATED COUNT: 13.2 % (ref 12.3–15.4)
GLOBULIN UR ELPH-MCNC: 3.2 GM/DL
GLUCOSE SERPL-MCNC: 121 MG/DL (ref 65–99)
HCT VFR BLD AUTO: 35.4 % (ref 34–46.6)
HGB BLD-MCNC: 12.1 G/DL (ref 12–15.9)
HOLD SPECIMEN: NORMAL
IMM GRANULOCYTES # BLD AUTO: 0 10*3/MM3 (ref 0–0.05)
IMM GRANULOCYTES NFR BLD AUTO: 0 % (ref 0–0.5)
LIPASE SERPL-CCNC: 26 U/L (ref 13–60)
LYMPHOCYTES # BLD AUTO: 3.34 10*3/MM3 (ref 0.7–3.1)
LYMPHOCYTES NFR BLD AUTO: 38.2 % (ref 19.6–45.3)
MCH RBC QN AUTO: 29.6 PG (ref 26.6–33)
MCHC RBC AUTO-ENTMCNC: 34.2 G/DL (ref 31.5–35.7)
MCV RBC AUTO: 86.6 FL (ref 79–97)
MONOCYTES # BLD AUTO: 0.64 10*3/MM3 (ref 0.1–0.9)
MONOCYTES NFR BLD AUTO: 7.3 % (ref 5–12)
NEUTROPHILS NFR BLD AUTO: 4.49 10*3/MM3 (ref 1.7–7)
NEUTROPHILS NFR BLD AUTO: 51.3 % (ref 42.7–76)
NT-PROBNP SERPL-MCNC: <36 PG/ML (ref 0–450)
PLATELET # BLD AUTO: 260 10*3/MM3 (ref 140–450)
PMV BLD AUTO: 10.7 FL (ref 6–12)
POTASSIUM SERPL-SCNC: 3.9 MMOL/L (ref 3.5–5.2)
PROT SERPL-MCNC: 7 G/DL (ref 6–8.5)
RBC # BLD AUTO: 4.09 10*6/MM3 (ref 3.77–5.28)
SODIUM SERPL-SCNC: 140 MMOL/L (ref 136–145)
TROPONIN T SERPL HS-MCNC: <6 NG/L
WBC NRBC COR # BLD AUTO: 8.75 10*3/MM3 (ref 3.4–10.8)
WHOLE BLOOD HOLD COAG: NORMAL
WHOLE BLOOD HOLD SPECIMEN: NORMAL

## 2024-09-28 PROCEDURE — 71045 X-RAY EXAM CHEST 1 VIEW: CPT

## 2024-09-28 PROCEDURE — 85025 COMPLETE CBC W/AUTO DIFF WBC: CPT | Performed by: EMERGENCY MEDICINE

## 2024-09-28 PROCEDURE — 84484 ASSAY OF TROPONIN QUANT: CPT | Performed by: EMERGENCY MEDICINE

## 2024-09-28 PROCEDURE — 83880 ASSAY OF NATRIURETIC PEPTIDE: CPT | Performed by: EMERGENCY MEDICINE

## 2024-09-28 PROCEDURE — 99284 EMERGENCY DEPT VISIT MOD MDM: CPT

## 2024-09-28 PROCEDURE — 80053 COMPREHEN METABOLIC PANEL: CPT | Performed by: EMERGENCY MEDICINE

## 2024-09-28 PROCEDURE — 83690 ASSAY OF LIPASE: CPT | Performed by: EMERGENCY MEDICINE

## 2024-09-28 PROCEDURE — 93005 ELECTROCARDIOGRAM TRACING: CPT | Performed by: EMERGENCY MEDICINE

## 2024-09-28 PROCEDURE — 96374 THER/PROPH/DIAG INJ IV PUSH: CPT

## 2024-09-28 PROCEDURE — 25010000002 KETOROLAC TROMETHAMINE PER 15 MG: Performed by: EMERGENCY MEDICINE

## 2024-09-28 RX ORDER — KETOROLAC TROMETHAMINE 15 MG/ML
15 INJECTION, SOLUTION INTRAMUSCULAR; INTRAVENOUS ONCE
Status: COMPLETED | OUTPATIENT
Start: 2024-09-28 | End: 2024-09-28

## 2024-09-28 RX ORDER — ASPIRIN 81 MG/1
324 TABLET, CHEWABLE ORAL ONCE
Status: COMPLETED | OUTPATIENT
Start: 2024-09-28 | End: 2024-09-28

## 2024-09-28 RX ORDER — SODIUM CHLORIDE 0.9 % (FLUSH) 0.9 %
10 SYRINGE (ML) INJECTION AS NEEDED
Status: DISCONTINUED | OUTPATIENT
Start: 2024-09-28 | End: 2024-09-29 | Stop reason: HOSPADM

## 2024-09-28 RX ADMIN — KETOROLAC TROMETHAMINE 15 MG: 15 INJECTION, SOLUTION INTRAMUSCULAR; INTRAVENOUS at 22:06

## 2024-09-28 RX ADMIN — ASPIRIN 81 MG 324 MG: 81 TABLET ORAL at 20:36

## 2024-09-29 NOTE — FSED PROVIDER NOTE
Subjective  History of Present Illness:    Patient presents to the emergency department with anterior chest pain starting 2 to 3 days ago.  She states that it is somewhat migrated from her right chest into her left chest.  It is intermittent in nature lasting approximately 1 to 2 seconds at a time.  Denies any shortness of breath, diaphoresis, nausea, vomiting during the events.  Denies any gastroesophageal reflux disease.  Denies any recent chest trauma.  Denies lifting anything heavy recently.  States that she had an upper respiratory type of illness approximately 2 weeks ago.  Denies any fever, chills, cough or congestion today.    Nurses Notes reviewed and agree, including vitals, allergies, social history and prior medical history.     REVIEW OF SYSTEMS: All systems reviewed and not pertinent unless noted.  Review of Systems   Cardiovascular:  Positive for chest pain.   All other systems reviewed and are negative.      Past Medical History:   Diagnosis Date    Anxiety     Chlamydia     a year ago, treated    Cholelithiasis     Depression     Gonorrhea     a year ago, treated    H. pylori infection        Allergies:    Patient has no known allergies.      Past Surgical History:   Procedure Laterality Date    WISDOM TOOTH EXTRACTION           Social History     Socioeconomic History    Marital status: Single   Tobacco Use    Smoking status: Some Days     Current packs/day: 0.50     Average packs/day: 0.5 packs/day for 6.3 years (3.2 ttl pk-yrs)     Types: Cigarettes     Start date: 6/5/2018    Smokeless tobacco: Never   Vaping Use    Vaping status: Never Used   Substance and Sexual Activity    Alcohol use: Yes     Comment: socially     Drug use: Not Currently     Types: Marijuana     Comment: last use July 2020    Sexual activity: Yes     Partners: Female, Male     Birth control/protection: None         Family History   Problem Relation Age of Onset    Diabetes Maternal Grandfather        Objective  Physical  "Exam:  BP 96/58 (BP Location: Left arm, Patient Position: Sitting)   Pulse 77   Temp 98.4 °F (36.9 °C) (Oral)   Resp 18   Ht 157.5 cm (62\")   Wt 65.8 kg (145 lb)   LMP 09/07/2024 (Exact Date)   SpO2 98%   BMI 26.52 kg/m²      Physical Exam  Vitals and nursing note reviewed.   Constitutional:       General: She is not in acute distress.     Appearance: She is well-developed and normal weight. She is not ill-appearing, toxic-appearing or diaphoretic.   HENT:      Head: Normocephalic and atraumatic.   Eyes:      Extraocular Movements: Extraocular movements intact.      Pupils: Pupils are equal, round, and reactive to light.   Cardiovascular:      Rate and Rhythm: Normal rate and regular rhythm.      Heart sounds: Normal heart sounds. No murmur heard.     No gallop.   Pulmonary:      Effort: Pulmonary effort is normal. No tachypnea.      Breath sounds: Normal breath sounds.   Chest:      Chest wall: No tenderness.   Abdominal:      General: Bowel sounds are normal.      Palpations: Abdomen is soft. There is no mass.      Tenderness: There is no abdominal tenderness.   Musculoskeletal:         General: Normal range of motion.   Skin:     General: Skin is warm.      Capillary Refill: Capillary refill takes less than 2 seconds.   Neurological:      General: No focal deficit present.      Mental Status: She is alert and oriented to person, place, and time.   Psychiatric:         Mood and Affect: Mood normal. Mood is not anxious.         Behavior: Behavior normal. Behavior is not agitated.         ECG 12 Lead ED Triage Standing Order; Chest Pain      Date/Time: 9/28/2024 9:01 PM    Performed by: Armani Araujo DO  Authorized by: Armani Araujo DO  Interpreted by ED physician  Comparison: not compared with previous ECG   Rhythm: sinus rhythm  Rate: normal  BPM: 71  QRS axis: normal  Conduction: conduction normal  ST Segments: ST segments normal  T Waves: T waves normal  Other: no other findings  Clinical " impression: normal ECG        ED Course:         Lab Results (last 24 hours)       Procedure Component Value Units Date/Time    High Sensitivity Troponin T [[id number]]  (Normal) Collected: 09/28/24 2047    Specimen: Blood Updated: 09/28/24 2109     HS Troponin T <6 ng/L     CBC & Differential [[id number]]  (Abnormal) Collected: 09/28/24 2047    Specimen: Blood Updated: 09/28/24 2055    Narrative:      The following orders were created for panel order CBC & Differential.  Procedure                               Abnormality         Status                     ---------                               -----------         ------                     CBC Auto Differential[[id number]]        Abnormal            Final result                 Please view results for these tests on the individual orders.    Comprehensive Metabolic Panel [[id number]]  (Abnormal) Collected: 09/28/24 2047    Specimen: Blood Updated: 09/28/24 2112     Glucose 121 mg/dL      BUN 14 mg/dL      Creatinine 0.83 mg/dL      Sodium 140 mmol/L      Potassium 3.9 mmol/L      Chloride 104 mmol/L      CO2 24.0 mmol/L      Calcium 8.9 mg/dL      Total Protein 7.0 g/dL      Albumin 3.8 g/dL      ALT (SGPT) 15 U/L      AST (SGOT) 20 U/L      Alkaline Phosphatase 108 U/L      Total Bilirubin 0.3 mg/dL      Globulin 3.2 gm/dL      A/G Ratio 1.2 g/dL      BUN/Creatinine Ratio 16.9     Anion Gap 12.0 mmol/L      eGFR 101.7 mL/min/1.73     Narrative:      GFR Normal >60  Chronic Kidney Disease <60  Kidney Failure <15      Lipase [[id number]]  (Normal) Collected: 09/28/24 2047    Specimen: Blood Updated: 09/28/24 2112     Lipase 26 U/L     BNP [[id number]]  (Normal) Collected: 09/28/24 2047    Specimen: Blood Updated: 09/28/24 2110     proBNP <36.0 pg/mL     Narrative:      This assay is used as an aid in the diagnosis of individuals suspected of having heart failure. It can be used as an aid in the diagnosis of acute decompensated heart failure (ADHF) in patients presenting  with signs and symptoms of ADHF to the emergency department (ED). In addition, NT-proBNP of <300 pg/mL indicates ADHF is not likely.    Age Range Result Interpretation  NT-proBNP Concentration (pg/mL:      <50             Positive            >450                   Gray                 300-450                    Negative             <300    50-75           Positive            >900                  Gray                300-900                  Negative            <300      >75             Positive            >1800                  Gray                300-1800                  Negative            <300    CBC Auto Differential [[id number]]  (Abnormal) Collected: 09/28/24 2047    Specimen: Blood Updated: 09/28/24 2055     WBC 8.75 10*3/mm3      RBC 4.09 10*6/mm3      Hemoglobin 12.1 g/dL      Hematocrit 35.4 %      MCV 86.6 fL      MCH 29.6 pg      MCHC 34.2 g/dL      RDW 13.2 %      RDW-SD 42.5 fl      MPV 10.7 fL      Platelets 260 10*3/mm3      Neutrophil % 51.3 %      Lymphocyte % 38.2 %      Monocyte % 7.3 %      Eosinophil % 3.0 %      Basophil % 0.2 %      Immature Grans % 0.0 %      Neutrophils, Absolute 4.49 10*3/mm3      Lymphocytes, Absolute 3.34 10*3/mm3      Monocytes, Absolute 0.64 10*3/mm3      Eosinophils, Absolute 0.26 10*3/mm3      Basophils, Absolute 0.02 10*3/mm3      Immature Grans, Absolute 0.00 10*3/mm3              No radiology results from the last 24 hrs       MDM  Number of Diagnoses or Management Options  Chest pain, unspecified type  Diagnosis management comments: Patient was labs are drawn.  Workup is benign.  This does not seem to be cardiac related chest pain.  The patient was discharged home after dose of Toradol follow-up on an outpatient basis return to the emergency department any worsening symptoms.       Amount and/or Complexity of Data Reviewed  Clinical lab tests: reviewed  Tests in the medicine section of CPT®: reviewed  Independent visualization of images, tracings, or specimens:  yes        Medications   sodium chloride 0.9 % flush 10 mL (has no administration in time range)   ketorolac (TORADOL) injection 15 mg (has no administration in time range)   aspirin chewable tablet 324 mg (324 mg Oral Given 9/28/24 2036)       Data interpreted: Nursing notes reviewed, vital signs reviewed.  Labs independently interpreted by me (CBC, CMP, lipase, UA, troponin, ABG, lactic acid, procalcitonin).  Imaging independently interpreted by me (x-ray, CT scan).  EKG independently interpreted by me.  O2 saturation:    Counseling: Discussed the results above with the patient regarding need for admission or discharge.  Patient understands and agrees plan of care.      -----  ED Disposition       ED Disposition   Discharge    Condition   Stable    Comment   --             Final diagnoses:   Chest pain, unspecified type      Your Follow-Up Providers       Dianna Garcia DO. Call in 2 days.    Specialty: Family Medicine  [address]  [place] [id number] [phone]               [facility] EMERGENCY DEPART[facility] [place].    Specialty: Emergency Medicine  Follow up details: As needed  [address]  [facility] [address] [phone]                     Contact information for after-discharge care    Follow-up information has not been specified.                    Your medication list        CONTINUE taking these medications        Instructions Last Dose Given Next Dose Due   amoxicillin-clavulanate 875-125 MG per tablet  Commonly known as: AUGMENTIN      Take 1 tablet by mouth 2 (Two) Times a Day.       fluticasone 50 MCG/ACT nasal spray  Commonly known as: FLONASE      INSTILL 2 SPRAYS IN NOSTRILS EVERY DAY       Loratadine-D 24HR  MG per 24 hr tablet  Generic drug: loratadine-pseudoephedrine      Take 1 tablet by mouth Daily.       predniSONE 20 MG tablet  Commonly known as: DELTASONE      Take 2 tablets by mouth Daily.

## 2024-09-30 LAB
QT INTERVAL: 388 MS
QTC INTERVAL: 421 MS

## 2024-10-26 LAB
QT INTERVAL: 388 MS
QTC INTERVAL: 421 MS

## 2024-10-28 ENCOUNTER — OFFICE VISIT (OUTPATIENT)
Dept: FAMILY MEDICINE CLINIC | Facility: CLINIC | Age: 24
End: 2024-10-28
Payer: COMMERCIAL

## 2024-10-28 ENCOUNTER — LAB (OUTPATIENT)
Dept: LAB | Facility: HOSPITAL | Age: 24
End: 2024-10-28
Payer: COMMERCIAL

## 2024-10-28 VITALS
RESPIRATION RATE: 21 BRPM | HEIGHT: 62 IN | SYSTOLIC BLOOD PRESSURE: 100 MMHG | BODY MASS INDEX: 25.83 KG/M2 | HEART RATE: 81 BPM | OXYGEN SATURATION: 97 % | DIASTOLIC BLOOD PRESSURE: 64 MMHG | TEMPERATURE: 98.6 F | WEIGHT: 140.4 LBS

## 2024-10-28 DIAGNOSIS — N92.6 MISSED PERIOD: Primary | ICD-10-CM

## 2024-10-28 DIAGNOSIS — R35.0 URINARY FREQUENCY: ICD-10-CM

## 2024-10-28 DIAGNOSIS — Z00.00 ANNUAL PHYSICAL EXAM: ICD-10-CM

## 2024-10-28 DIAGNOSIS — R53.82 CHRONIC FATIGUE: ICD-10-CM

## 2024-10-28 DIAGNOSIS — R10.9 BELLY PAIN: ICD-10-CM

## 2024-10-28 DIAGNOSIS — N89.8 VAGINAL DISCHARGE: ICD-10-CM

## 2024-10-28 LAB
B-HCG UR QL: NEGATIVE
BILIRUB BLD-MCNC: NEGATIVE MG/DL
CLARITY, POC: CLEAR
COLOR UR: YELLOW
DEPRECATED RDW RBC AUTO: 40.6 FL (ref 37–54)
ERYTHROCYTE [DISTWIDTH] IN BLOOD BY AUTOMATED COUNT: 12.6 % (ref 12.3–15.4)
EXPIRATION DATE: NORMAL
EXPIRATION DATE: NORMAL
GLUCOSE UR STRIP-MCNC: NEGATIVE MG/DL
HBA1C MFR BLD: 4.9 % (ref 4.8–5.6)
HCT VFR BLD AUTO: 40 % (ref 34–46.6)
HGB BLD-MCNC: 13.4 G/DL (ref 12–15.9)
INTERNAL NEGATIVE CONTROL: NORMAL
INTERNAL POSITIVE CONTROL: NORMAL
KETONES UR QL: NEGATIVE
LEUKOCYTE EST, POC: NEGATIVE
Lab: NORMAL
Lab: NORMAL
MCH RBC QN AUTO: 29.6 PG (ref 26.6–33)
MCHC RBC AUTO-ENTMCNC: 33.5 G/DL (ref 31.5–35.7)
MCV RBC AUTO: 88.5 FL (ref 79–97)
NITRITE UR-MCNC: NEGATIVE MG/ML
PH UR: 7 [PH] (ref 5–8)
PLATELET # BLD AUTO: 263 10*3/MM3 (ref 140–450)
PMV BLD AUTO: 11.1 FL (ref 6–12)
PROT UR STRIP-MCNC: NEGATIVE MG/DL
RBC # BLD AUTO: 4.52 10*6/MM3 (ref 3.77–5.28)
RBC # UR STRIP: NEGATIVE /UL
SP GR UR: 1.01 (ref 1–1.03)
UROBILINOGEN UR QL: NORMAL
WBC NRBC COR # BLD AUTO: 8.39 10*3/MM3 (ref 3.4–10.8)

## 2024-10-28 PROCEDURE — 82306 VITAMIN D 25 HYDROXY: CPT

## 2024-10-28 PROCEDURE — 87798 DETECT AGENT NOS DNA AMP: CPT | Performed by: STUDENT IN AN ORGANIZED HEALTH CARE EDUCATION/TRAINING PROGRAM

## 2024-10-28 PROCEDURE — 87491 CHLMYD TRACH DNA AMP PROBE: CPT | Performed by: STUDENT IN AN ORGANIZED HEALTH CARE EDUCATION/TRAINING PROGRAM

## 2024-10-28 PROCEDURE — 99395 PREV VISIT EST AGE 18-39: CPT | Performed by: STUDENT IN AN ORGANIZED HEALTH CARE EDUCATION/TRAINING PROGRAM

## 2024-10-28 PROCEDURE — 86704 HEP B CORE ANTIBODY TOTAL: CPT

## 2024-10-28 PROCEDURE — 87661 TRICHOMONAS VAGINALIS AMPLIF: CPT | Performed by: STUDENT IN AN ORGANIZED HEALTH CARE EDUCATION/TRAINING PROGRAM

## 2024-10-28 PROCEDURE — 86803 HEPATITIS C AB TEST: CPT

## 2024-10-28 PROCEDURE — 1159F MED LIST DOCD IN RCRD: CPT | Performed by: STUDENT IN AN ORGANIZED HEALTH CARE EDUCATION/TRAINING PROGRAM

## 2024-10-28 PROCEDURE — G0432 EIA HIV-1/HIV-2 SCREEN: HCPCS

## 2024-10-28 PROCEDURE — 86706 HEP B SURFACE ANTIBODY: CPT

## 2024-10-28 PROCEDURE — 81025 URINE PREGNANCY TEST: CPT | Performed by: STUDENT IN AN ORGANIZED HEALTH CARE EDUCATION/TRAINING PROGRAM

## 2024-10-28 PROCEDURE — 87340 HEPATITIS B SURFACE AG IA: CPT

## 2024-10-28 PROCEDURE — 36415 COLL VENOUS BLD VENIPUNCTURE: CPT

## 2024-10-28 PROCEDURE — 86592 SYPHILIS TEST NON-TREP QUAL: CPT

## 2024-10-28 PROCEDURE — 2014F MENTAL STATUS ASSESS: CPT | Performed by: STUDENT IN AN ORGANIZED HEALTH CARE EDUCATION/TRAINING PROGRAM

## 2024-10-28 PROCEDURE — 81003 URINALYSIS AUTO W/O SCOPE: CPT | Performed by: STUDENT IN AN ORGANIZED HEALTH CARE EDUCATION/TRAINING PROGRAM

## 2024-10-28 PROCEDURE — 80050 GENERAL HEALTH PANEL: CPT

## 2024-10-28 PROCEDURE — 1126F AMNT PAIN NOTED NONE PRSNT: CPT | Performed by: STUDENT IN AN ORGANIZED HEALTH CARE EDUCATION/TRAINING PROGRAM

## 2024-10-28 PROCEDURE — 82607 VITAMIN B-12: CPT

## 2024-10-28 PROCEDURE — 87591 N.GONORRHOEAE DNA AMP PROB: CPT | Performed by: STUDENT IN AN ORGANIZED HEALTH CARE EDUCATION/TRAINING PROGRAM

## 2024-10-28 PROCEDURE — 80061 LIPID PANEL: CPT

## 2024-10-28 PROCEDURE — 83036 HEMOGLOBIN GLYCOSYLATED A1C: CPT

## 2024-10-28 PROCEDURE — 1160F RVW MEDS BY RX/DR IN RCRD: CPT | Performed by: STUDENT IN AN ORGANIZED HEALTH CARE EDUCATION/TRAINING PROGRAM

## 2024-10-28 PROCEDURE — 87801 DETECT AGNT MULT DNA AMPLI: CPT | Performed by: STUDENT IN AN ORGANIZED HEALTH CARE EDUCATION/TRAINING PROGRAM

## 2024-10-28 RX ORDER — FLUCONAZOLE 150 MG/1
TABLET ORAL
COMMUNITY
Start: 2024-10-22 | End: 2024-10-28

## 2024-10-28 RX ORDER — METRONIDAZOLE 500 MG/1
1 TABLET ORAL EVERY 12 HOURS SCHEDULED
COMMUNITY
Start: 2024-10-22 | End: 2024-10-28

## 2024-10-28 NOTE — PROGRESS NOTES
"Chief Complaint  Annual Exam (Physical, would like to be tested for bv, is having unusual discharge, lower back pain and crampy with nausea )    History of Present Illness        Patient would like to be tested for bv. She has had what she calls \"slimy\" discharge. She has some abnormal smell that resolved. It does not burn with urination. She has low back pain with some nausea no vomiting. The low back pain is a few days and is mild. No pain with sex. Periods are normal aside from luis telling her that she is due for period one week early. She has belly pain that comes as sharp pains that is short lasting a few moments a couple times in rlq.  No blood in the urine.     She also reports chronic fatigue. No joint pain or fever. No muscle pain.   No abnormal rashes. She does snore.     Annual exam  Pap smear recommended September 2025.   Counseled against smoking. Will call in nicotine gum. Do not take if pregnant.   Recommend dental and eye exam  hiv hep c sti screening: patient is agreeable.   a1c /lipid screening: patient is agreeable.   Vaccines recommended:  covid vaccine  flu  hepatitis      The following portions of the patient's history were reviewed and updated as appropriate: allergies, current medications, past family history, past medical history, past social history, past surgical history, and problem list.    OBJECTIVE:  /64 (BP Location: Right arm, Patient Position: Sitting, Cuff Size: Adult)   Pulse 81   Temp 98.6 °F (37 °C) (Temporal)   Resp 21   Ht 157.5 cm (62\")   Wt 63.7 kg (140 lb 6.4 oz)   SpO2 97%   BMI 25.68 kg/m²       Physical Exam  Constitutional:       General: She is not in acute distress.     Appearance: Normal appearance. She is not ill-appearing.      Comments: Appears well conversating normally.    HENT:      Head: Normocephalic and atraumatic.   Eyes:      Extraocular Movements: Extraocular movements intact.   Cardiovascular:      Rate and Rhythm: Normal rate and regular " rhythm.      Heart sounds: No murmur heard.  Pulmonary:      Effort: Pulmonary effort is normal. No respiratory distress.      Breath sounds: Normal breath sounds. No stridor. No wheezing, rhonchi or rales.   Abdominal:      General: Bowel sounds are normal.      Palpations: Abdomen is soft.      Tenderness: There is abdominal tenderness (she has mild tenderness without guarding in rlq). There is no guarding or rebound.   Skin:     Findings: No rash.   Neurological:      General: No focal deficit present.      Mental Status: She is alert.   Psychiatric:         Mood and Affect: Mood normal.                    Assessment and Plan   Diagnoses and all orders for this visit:    1. Missed period (Primary)  -     POCT pregnancy, urine    2. Urinary frequency  -     POC Urinalysis Dipstick, Automated    3. Annual physical exam  -     CBC (No Diff); Future  -     Comprehensive Metabolic Panel; Future  -     Hemoglobin A1c; Future  -     Lipid Panel; Future  -     TSH Rfx On Abnormal To Free T4; Future  -     Vitamin B12; Future  -     Vitamin D,25-Hydroxy; Future  -     RPR Qualitative with Reflex to Quant; Future  -     HIV-1 / O / 2 Ag / Antibody; Future  -     Hepatitis B Virus (HBV) Screening and Diagnosis; Future  -     Hepatitis C Antibody; Future    4. Chronic fatigue  -     Ambulatory Referral to Sleep Medicine    5. Belly pain  -     CT Abdomen Pelvis With & Without Contrast; Future    6. Vaginal discharge  -     NuSwab VG+ - Swab, Vagina; Future    Other orders  -     nicotine polacrilex (NICORETTE) 2 MG gum; Chew 1 each As Needed for Smoking Cessation.  Dispense: 60 each; Refill: 11    Annual exam  Pap smear recommended September 2025.   Counseled against smoking. Will call in nicotine gum. Do not take if pregnant.   Recommend dental and eye exam  hiv hep c sti screening: patient is agreeable.   a1c /lipid screening: patient is agreeable.   Vaccines recommended:  covid vaccine  flu  Hepatitis      Belly  pain  Check ct a/p given rlq pain concerning for appendicitis, not acute right now given no significant pain and vitals normal.   I asked her to go to er for any worsening pain dizziness vomiting fever.       Fatigue w snoring  Refer to sleep med for eval.      Return in about 1 year (around 10/28/2025), or if symptoms worsen or fail to improve, for Annual physical.       Dianna Garcia D.O.  [facility] [place] [place]

## 2024-10-29 LAB
25(OH)D3 SERPL-MCNC: 23.4 NG/ML (ref 30–100)
ALBUMIN SERPL-MCNC: 3.9 G/DL (ref 3.5–5.2)
ALBUMIN/GLOB SERPL: 1.2 G/DL
ALP SERPL-CCNC: 107 U/L (ref 39–117)
ALT SERPL W P-5'-P-CCNC: 18 U/L (ref 1–33)
ANION GAP SERPL CALCULATED.3IONS-SCNC: 8 MMOL/L (ref 5–15)
AST SERPL-CCNC: 20 U/L (ref 1–32)
BILIRUB SERPL-MCNC: 0.2 MG/DL (ref 0–1.2)
BUN SERPL-MCNC: 9 MG/DL (ref 6–20)
BUN/CREAT SERPL: 10.8 (ref 7–25)
CALCIUM SPEC-SCNC: 9.2 MG/DL (ref 8.6–10.5)
CHLORIDE SERPL-SCNC: 105 MMOL/L (ref 98–107)
CHOLEST SERPL-MCNC: 151 MG/DL (ref 0–200)
CO2 SERPL-SCNC: 24 MMOL/L (ref 22–29)
CREAT SERPL-MCNC: 0.83 MG/DL (ref 0.57–1)
EGFRCR SERPLBLD CKD-EPI 2021: 101.7 ML/MIN/1.73
GLOBULIN UR ELPH-MCNC: 3.3 GM/DL
GLUCOSE SERPL-MCNC: 83 MG/DL (ref 65–99)
HCV AB SER QL: NORMAL
HDLC SERPL-MCNC: 68 MG/DL (ref 40–60)
HIV 1+2 AB+HIV1 P24 AG SERPL QL IA: NORMAL
LDLC SERPL CALC-MCNC: 71 MG/DL (ref 0–100)
LDLC/HDLC SERPL: 1.06 {RATIO}
POTASSIUM SERPL-SCNC: 3.9 MMOL/L (ref 3.5–5.2)
PROT SERPL-MCNC: 7.2 G/DL (ref 6–8.5)
RPR SER QL: NORMAL
SODIUM SERPL-SCNC: 137 MMOL/L (ref 136–145)
TRIGL SERPL-MCNC: 55 MG/DL (ref 0–150)
TSH SERPL DL<=0.05 MIU/L-ACNC: 1.06 UIU/ML (ref 0.27–4.2)
VIT B12 BLD-MCNC: 395 PG/ML (ref 211–946)
VLDLC SERPL-MCNC: 12 MG/DL (ref 5–40)

## 2024-10-29 NOTE — PROGRESS NOTES
Please call the patient to let her know:  Vitamin D is low. Make sure to take 800-1000 units daily over the counter.

## 2024-10-30 ENCOUNTER — TELEPHONE (OUTPATIENT)
Dept: FAMILY MEDICINE CLINIC | Facility: CLINIC | Age: 24
End: 2024-10-30
Payer: COMMERCIAL

## 2024-10-30 LAB
HBV CORE AB SERPL QL IA: NEGATIVE
HBV SURFACE AB SER QL: NON REACTIVE
HBV SURFACE AG SERPL QL IA: NEGATIVE
IMP & REVIEW OF LAB RESULTS: NORMAL
LABORATORY COMMENT REPORT: NORMAL

## 2024-10-30 NOTE — TELEPHONE ENCOUNTER
Called patient with the message below he voiced understanding.    Please call the patient to let her know:  Vitamin D is low. Make sure to take 800-1000 units daily over the counter.

## 2024-10-31 ENCOUNTER — TELEPHONE (OUTPATIENT)
Dept: FAMILY MEDICINE CLINIC | Facility: CLINIC | Age: 24
End: 2024-10-31
Payer: COMMERCIAL

## 2024-10-31 LAB
A VAGINAE DNA VAG QL NAA+PROBE: NORMAL SCORE
BVAB2 DNA VAG QL NAA+PROBE: NORMAL SCORE
C ALBICANS DNA VAG QL NAA+PROBE: NEGATIVE
C GLABRATA DNA VAG QL NAA+PROBE: NEGATIVE
C TRACH DNA SPEC QL NAA+PROBE: NEGATIVE
MEGA1 DNA VAG QL NAA+PROBE: NORMAL SCORE
N GONORRHOEA DNA VAG QL NAA+PROBE: NEGATIVE
T VAGINALIS DNA VAG QL NAA+PROBE: NEGATIVE

## 2024-10-31 NOTE — TELEPHONE ENCOUNTER
Called patient ad she voiced understanding.    She is not immune to hep b, recommend vaccination.

## 2024-11-14 DIAGNOSIS — R10.9 BELLY PAIN: Primary | ICD-10-CM

## 2024-12-09 ENCOUNTER — HOSPITAL ENCOUNTER (OUTPATIENT)
Facility: HOSPITAL | Age: 24
Discharge: HOME | End: 2024-12-09
Admitting: STUDENT IN AN ORGANIZED HEALTH CARE EDUCATION/TRAINING PROGRAM
Payer: COMMERCIAL

## 2024-12-09 DIAGNOSIS — R10.9 BELLY PAIN: ICD-10-CM

## 2024-12-09 PROCEDURE — 76700 US EXAM ABDOM COMPLETE: CPT

## 2024-12-12 ENCOUNTER — TELEPHONE (OUTPATIENT)
Dept: FAMILY MEDICINE CLINIC | Facility: CLINIC | Age: 24
End: 2024-12-12
Payer: COMMERCIAL

## 2024-12-12 NOTE — TELEPHONE ENCOUNTER
Hub to Relay    Called patient and left message for patient to return our call and also sent it through [facility] chart.    Please let the patient know her ultrasound looks normal.

## 2025-01-17 NOTE — PROGRESS NOTES
"Chief Complaint  Sleeping Problem, Snoring, Daytime Sleepiness, Fatigue, Frequent Awakenings, Morning Headaches, Unable To Fall Asleep, and Unable To Stay Asleep    Subjective     History of Present Illness:  Alaina Kennedy is a 24 y.o. female with depression, and anxiety.  The patient is referred by Dianna Garcia DO with primary care.  Patient has difficulty with fatigue, and snoring.  Review of self-reported questionnaire notes symptoms including the aforementioned snoring, daytime sleepiness and fatigue, frequent awakening, morning headaches, grinding teeth, frequent nighttime urination, difficulty falling and staying asleep, sleep talking, memory loss, and lack of concentration.  The patient reports symptoms of been ongoing for 2-3 years.  Patient typically goes to bed at 11 PM or later waking at 6:15 AM on weekdays and 8 AM on weekends.  She estimates an average of 6 hours of sleep per night and it takes approximately 30-60 minutes for her to get to sleep.  Patient denies use of tobacco, drinks alcohol monthly or less.  Patient occasionally drinks coffee and drinks 1 regular cola daily.     Further details are as follows:    Naples Scale is (out of 24): Total score: 8     Estimated average amount of sleep per night: 6 hours or less  Number of times she wakes up at night: at least 2-3 times  Difficulty falling back asleep: yes  It usually takes 30-60 minutes to go to sleep. Her brain doesn't \"shut off\"  She feels sleepy upon waking up: yes  Rotating or night shift work: no    Drowsiness/Sleepiness:  She exhibits the following:  excessive daytime sleepiness  excessive daytime fatigue    Snoring/Breathing:  She exhibits the following:  loud snoring, awakens with dry mouth, sore throat when waking up in the morning, and morning headaches    Head Injury:  She exhibits the following:  No    Reflux:  She describes the following:  Not waking with     Narcolepsy:  She exhibits the " following:  none    RLS/PLMs:  She describes the following:  none    Insomnia:  She describes the following:  problems initiating sleep at night  frequent awakenings  bothered by pain at night    Parasomnia:  She exhibits the following:  sleep talks  grinds teeth    Weight:       25  1314   Weight: 64 kg (141 lb 1.6 oz)      Weight change in the last year:  gain: 0 lbs    The patient's relevant past medical, surgical, family, and social history reviewed and updated in Epic as appropriate.    Review of Systems   Constitutional:  Positive for fatigue.   HENT:  Positive for drooling and sneezing.    Eyes: Negative.    Respiratory: Negative.     Cardiovascular: Negative.    Gastrointestinal:  Positive for abdominal pain, constipation and diarrhea.   Endocrine: Negative.    Genitourinary: Negative.    Musculoskeletal: Negative.    Skin: Negative.    Allergic/Immunologic: Negative.    Neurological:  Positive for dizziness, light-headedness and headache.   Psychiatric/Behavioral:  Positive for sleep disturbance. The patient is nervous/anxious.    All other systems reviewed and are negative.      PMH:    Past Medical History:   Diagnosis Date    Anxiety     Chlamydia     a year ago, treated    Cholelithiasis     Depression     Gonorrhea     a year ago, treated    H. pylori infection      Past Surgical History:   Procedure Laterality Date    WISDOM TOOTH EXTRACTION       OB History          3    Para   2    Term   2            AB        Living   2         SAB        IAB        Ectopic        Molar        Multiple   0    Live Births   2              No Known Allergies    MEDS:  Prior to Admission medications    Medication Sig Start Date End Date Taking? Authorizing Provider   nicotine polacrilex (NICORETTE) 2 MG gum Chew 1 each As Needed for Smoking Cessation. 10/28/24   Dianna Garcia DO       FH:  Family History   Problem Relation Age of Onset    Diabetes Maternal Grandfather        Objective   Vital  "Signs:  BP 94/64 (BP Location: Left arm, Patient Position: Sitting, Cuff Size: Adult)   Pulse 75   Temp 97.7 °F (36.5 °C) (Temporal)   Ht 157.5 cm (62.01\")   Wt 64 kg (141 lb 1.6 oz)   SpO2 99%   BMI 25.80 kg/m²     Patient's (Body mass index is 25.8 kg/m².) indicates that they are overweight (BMI 25-29.9) with health related conditions that include obstructive sleep apnea, hypertension, coronary heart disease, and diabetes mellitus . Weight is unchanged. BMI is is above average; BMI management plan is completed. We discussed portion control and increasing exercise.          Physical Exam  Vitals reviewed.   Constitutional:       Appearance: Normal appearance.   HENT:      Head: Normocephalic and atraumatic.      Nose: Nose normal.      Mouth/Throat:      Mouth: Mucous membranes are moist.   Cardiovascular:      Rate and Rhythm: Normal rate and regular rhythm.      Heart sounds: No murmur heard.     No friction rub. No gallop.   Pulmonary:      Effort: Pulmonary effort is normal. No respiratory distress.      Breath sounds: Normal breath sounds. No wheezing or rhonchi.   Neurological:      Mental Status: She is alert and oriented to person, place, and time.   Psychiatric:         Behavior: Behavior normal.       Mallampati Score: IV (only hard palate visible)    Result Review :              Assessment and Plan  Alaina Kennedy is a 24 y.o. female with a past medical history of bipolar disorder, depression, and anxiety who presents for further evaluation of excessive daytime sleepiness and fatigue, nonrestorative sleep, and concerns for sleep disordered breathing and obstructive sleep apnea. The patient's symptoms, particularly snoring, are concerning for significant sleep disordered breathing and obstructive sleep apnea. We will obtain a home sleep test for further evaluation.  The patient will return for follow-up and recommendations after test.  I have discussed weight loss as it pertains to obstructive " sleep apnea.    With regard to her hypersomnia, we have discussed the myriad of reasons why this can occur including sleep apnea.  Patient does have a history of bipolar disorder and had discontinued her medication.  I am encouraged to hear that she called her psychiatrist today and she is to restart her treatment.  Certainly bipolar depression can contribute to excessive fatigue.  I have also discussed sleep hygiene and given the patient information about it.    Diagnoses and all orders for this visit:    1. Sleep apnea, unspecified type (Primary)  -     Home Sleep Study; Future    2. Snoring  -     Home Sleep Study; Future    3. Excessive daytime sleepiness  -     Home Sleep Study; Future    4. Overweight with body mass index (BMI) 25.0-29.9                 I discussed the consequences of uncontrolled sleep apnea including hypertension, heart disease, diabetes, stroke, and dementia. I further discussed sleep apnea therapeutic options including CPAP, Weight loss, Oral dental appliance, and surgery.         Follow Up  Return for Follow up after study.  Patient was given instructions and counseling regarding her condition or for health maintenance advice. Please see specific information pulled into the AVS if appropriate.     SERA Quiroz, ACNP-BC  Pulmonology, Critical Care, and Sleep Medicine

## 2025-01-22 ENCOUNTER — OFFICE VISIT (OUTPATIENT)
Dept: SLEEP MEDICINE | Age: 25
End: 2025-01-22
Payer: COMMERCIAL

## 2025-01-22 VITALS
HEIGHT: 62 IN | BODY MASS INDEX: 25.96 KG/M2 | WEIGHT: 141.1 LBS | TEMPERATURE: 97.7 F | DIASTOLIC BLOOD PRESSURE: 64 MMHG | HEART RATE: 75 BPM | OXYGEN SATURATION: 99 % | SYSTOLIC BLOOD PRESSURE: 94 MMHG

## 2025-01-22 DIAGNOSIS — G47.30 SLEEP APNEA, UNSPECIFIED TYPE: Primary | ICD-10-CM

## 2025-01-22 DIAGNOSIS — E66.3 OVERWEIGHT WITH BODY MASS INDEX (BMI) 25.0-29.9: ICD-10-CM

## 2025-01-22 DIAGNOSIS — G47.19 EXCESSIVE DAYTIME SLEEPINESS: ICD-10-CM

## 2025-01-22 DIAGNOSIS — R06.83 SNORING: ICD-10-CM

## 2025-03-10 ENCOUNTER — HOSPITAL ENCOUNTER (OUTPATIENT)
Dept: SLEEP MEDICINE | Facility: HOSPITAL | Age: 25
Discharge: HOME | End: 2025-03-10
Admitting: NURSE PRACTITIONER
Payer: COMMERCIAL

## 2025-03-10 VITALS — WEIGHT: 141.09 LBS | HEIGHT: 62 IN | BODY MASS INDEX: 25.96 KG/M2

## 2025-03-10 DIAGNOSIS — R06.83 SNORING: ICD-10-CM

## 2025-03-10 DIAGNOSIS — G47.30 SLEEP APNEA, UNSPECIFIED TYPE: ICD-10-CM

## 2025-03-10 DIAGNOSIS — G47.19 EXCESSIVE DAYTIME SLEEPINESS: ICD-10-CM

## 2025-03-10 PROCEDURE — G0399 HOME SLEEP TEST/TYPE 3 PORTA: HCPCS

## 2025-03-11 ENCOUNTER — LAB (OUTPATIENT)
Dept: LAB | Facility: HOSPITAL | Age: 25
End: 2025-03-11
Payer: COMMERCIAL

## 2025-03-11 ENCOUNTER — OFFICE VISIT (OUTPATIENT)
Dept: FAMILY MEDICINE CLINIC | Facility: CLINIC | Age: 25
End: 2025-03-11
Payer: COMMERCIAL

## 2025-03-11 VITALS
HEIGHT: 62 IN | WEIGHT: 144 LBS | BODY MASS INDEX: 26.5 KG/M2 | TEMPERATURE: 97.8 F | OXYGEN SATURATION: 100 % | HEART RATE: 59 BPM | DIASTOLIC BLOOD PRESSURE: 64 MMHG | RESPIRATION RATE: 18 BRPM | SYSTOLIC BLOOD PRESSURE: 100 MMHG

## 2025-03-11 DIAGNOSIS — Z11.3 SCREENING EXAMINATION FOR STI: ICD-10-CM

## 2025-03-11 DIAGNOSIS — Z00.00 ANNUAL PHYSICAL EXAM: ICD-10-CM

## 2025-03-11 DIAGNOSIS — Z11.3 SCREENING EXAMINATION FOR STI: Primary | ICD-10-CM

## 2025-03-11 LAB
HCV AB SER QL: NORMAL
HIV 1+2 AB+HIV1 P24 AG SERPL QL IA: NORMAL
RPR SER QL: NORMAL

## 2025-03-11 PROCEDURE — 87491 CHLMYD TRACH DNA AMP PROBE: CPT | Performed by: STUDENT IN AN ORGANIZED HEALTH CARE EDUCATION/TRAINING PROGRAM

## 2025-03-11 PROCEDURE — 86592 SYPHILIS TEST NON-TREP QUAL: CPT

## 2025-03-11 PROCEDURE — G0432 EIA HIV-1/HIV-2 SCREEN: HCPCS

## 2025-03-11 PROCEDURE — 86704 HEP B CORE ANTIBODY TOTAL: CPT

## 2025-03-11 PROCEDURE — 86706 HEP B SURFACE ANTIBODY: CPT

## 2025-03-11 PROCEDURE — 87340 HEPATITIS B SURFACE AG IA: CPT

## 2025-03-11 PROCEDURE — 87591 N.GONORRHOEAE DNA AMP PROB: CPT | Performed by: STUDENT IN AN ORGANIZED HEALTH CARE EDUCATION/TRAINING PROGRAM

## 2025-03-11 PROCEDURE — 86803 HEPATITIS C AB TEST: CPT

## 2025-03-11 PROCEDURE — 36415 COLL VENOUS BLD VENIPUNCTURE: CPT

## 2025-03-11 RX ORDER — SERTRALINE HYDROCHLORIDE 25 MG/1
1 TABLET, FILM COATED ORAL DAILY
COMMUNITY
Start: 2025-01-22

## 2025-03-11 RX ORDER — IBUPROFEN 800 MG/1
TABLET, FILM COATED ORAL
COMMUNITY
Start: 2025-01-18

## 2025-03-11 RX ORDER — CLOTRIMAZOLE AND BETAMETHASONE DIPROPIONATE 10; .64 MG/G; MG/G
1 CREAM TOPICAL 2 TIMES DAILY
Qty: 1 EACH | Refills: 0 | Status: SHIPPED | OUTPATIENT
Start: 2025-03-11

## 2025-03-11 RX ORDER — OSELTAMIVIR PHOSPHATE 75 MG/1
1 CAPSULE ORAL EVERY 12 HOURS SCHEDULED
COMMUNITY
Start: 2025-01-18

## 2025-03-11 RX ORDER — CHLORHEXIDINE GLUCONATE ORAL RINSE 1.2 MG/ML
SOLUTION DENTAL
COMMUNITY
Start: 2024-12-16

## 2025-03-11 RX ORDER — ARIPIPRAZOLE 5 MG/1
5 TABLET ORAL NIGHTLY
COMMUNITY
Start: 2025-01-22

## 2025-03-11 NOTE — LETTER
[facility]  Vaccine Consent Form    Patient Name:  Alaina Kennedy  Patient :  2000     Vaccine(s) Ordered    HPV Vaccine  Pneumococcal Conjugate Vaccine 20-Valent (PCV20)        Screening Checklist  The following questions should be completed prior to vaccination. If you answer “yes” to any question, it does not necessarily mean you should not be vaccinated. It just means we may need to clarify or ask more questions. If a question is unclear, please ask your healthcare provider to explain it.    Yes No   Any fever or moderate to severe illness today (mild illness and/or antibiotic treatment are not contraindications)?     Do you have a history of a serious reaction to any previous vaccinations, such as anaphylaxis, encephalopathy within 7 days, Guillain-Columbus syndrome within 6 weeks, seizure?     Have you received any live vaccine(s) (e.g MMR, VIANNEY) or any other vaccines in the last month (to ensure duplicate doses aren't given)?     Do you have an anaphylactic allergy to latex (DTaP, DTaP-IPV, Hep A, Hep B, MenB, RV, Td, Tdap), baker’s yeast (Hep B, HPV), polysorbates (RSV, nirsevimab, PCV 20, Rotavirrus, Tdap, Shingrix), or gelatin (VIANNEY, MMR)?     Do you have an anaphylactic allergy to neomycin (Rabies, VIANNEY, MMR, IPV, Hep A), polymyxin B (IPV), or streptomycin (IPV)?      Any cancer, leukemia, AIDS, or other immune system disorder? (VIANNEY, MMR, RV)     Do you have a parent, brother, or sister with an immune system problem (if immune competence of vaccine recipient clinically verified, can proceed)? (MMR, VIANNEY)     Any recent steroid treatments for >2 weeks, chemotherapy, or radiation treatment? (VIANNEY, MMR)     Have you received antibody-containing blood transfusions or IVIG in the past 11 months (recommended interval is dependent on product)? (MMR, VIANNEY)     Have you taken antiviral drugs (acyclovir, famciclovir, valacyclovir for VIANNEY) in the last 24 or 48 hours, respectively?      Are you pregnant or  "planning to become pregnant within 1 month? (VIANNEY, MMR, HPV, IPV, MenB, Abrexvy; For Hep B- refer to Engerix-B; For RSV - Abrysvo is indicated for 32-36 weeks of pregnancy from September to January)     For infants, have you ever been told your child has had intussusception or a medical emergency involving obstruction of the intestine (Rotavirus)? If not for an infant, can skip this question.         *Ordering Physicians/APC should be consulted if \"yes\" is checked by the patient or guardian above.  I have received, read, and understand the Vaccine Information Statement (VIS) for each vaccine ordered.  I have considered my or my child's health status as well as the health status of my close contacts.  I have taken the opportunity to discuss my vaccine questions with my or my child's health care provider.   I have requested that the ordered vaccine(s) be given to me or my child.  I understand the benefits and risks of the vaccines.  I understand that I should remain in the clinic for 15 minutes after receiving the vaccine(s).  _________________________________________________________  Signature of Patient or Parent/Legal Guardian ____________________  Date     "

## 2025-03-11 NOTE — PATIENT INSTRUCTIONS
The Tobacco Use Log can help patients to identify activities or situations that trigger the desire to smoke or use other forms of tobacco. It is important for patients to understand these environmental cues so that they can develop coping strategies to overcome the temptation to use tobacco. Clinicians can use this information to suggest alternative behaviors to increase the likelihood of a successful quit attempt. The log is most appropriate for patients who are preparing for a quit attempt, but it can be used with any patient who wants to learn more about his or her smoking behavior.      Instructions for use:   The Tobacco Use Log is a piece of paper that is kept with the patient’s tobacco. It can be folded and wrapped around the cigarette pack or can of snuff with a rubber band. Alternatively, patients may keep the log in their wallet or day planner. It is important that the log be readily available at the times when the patient uses the tobacco. Through careful documentation of tobacco use over a period of several days, patient-specific tobacco usage patterns become evident.         1. Instruct the patient to continue his or her regular tobacco use for a period of at least three days (including one non-work day). It is preferable to complete the Tobacco Use Log for seven consecutive days , because usage patterns might fluctuate as a function of the day of the week (e.g. weekends vs. work days). The patient should not attempt to reduce his or her tobacco use during this time. The intent is to document current tobacco use habits and patterns, so that the patient can understand the triggers and situations associated with his or her tobacco use.         2. The following information should be noted in the Tobacco Use Log each time tobacco is used:    - Time of day (indicate AM or PM)    - Description of the activity/situation at the time of tobacco use (e.g., were others present?)    - Need rating of the patient’s  perceived importance of using tobacco, at that time, using the following scale:     Not very important   (would not have missed it)  1   Moderately important  2 Very important  (would have missed it a great deal)  3        3. The patient should use a separate log sheet each day. Note: Heavy tobacco users will require more than one log sheet per day.           4. Just prior to the quit date, review the Tobacco Use Log with the patient to identify specific situations that trigger tobacco use. Additionally, discuss specific cognitive and behavioral strategies to prevent relapse.     Tobacco Use Log for (date): ___/___/___     Time  Describe the situation/activity of the tobacco use Need rating  [place] 1 number     1.   1 2 3   2   1 2 3   3.   1 2 3   4.   1 2 3   5.   1 2 3   6.   1 2 3   7.   1 2 3   8.   1 2 3   9.   1 2 3   10.   1 2 3   11.   1 2 3   12.   1 2 3   13.   1 2 3   14.   1 2 3   15.   1 2 3   16.   1 2 3   17.   1 2 3   18.   1 2 3   19.   1 2 3   20.   1 2 3     *Need RATING: Rate the importance of your need to use tobacco for each instance of use -- based on the following scale:    Not very important   (would not have missed it)  1   Moderately important  2 Very important  (would have missed it a great deal)  3

## 2025-03-11 NOTE — PROGRESS NOTES
"Chief Complaint  Exposure to STD (Patient wanting to get tested for sti's and has some circular patches on side thinking may be ringworm?)    Exposure to STD             No symptoms at this time or known exposures but she would like sti screening.     She has had patches on left leg couple days. No fever.     The following portions of the patient's history were reviewed and updated as appropriate: allergies, current medications, past family history, past medical history, past social history, past surgical history, and problem list.    OBJECTIVE:  /64 (BP Location: Right arm, Patient Position: Sitting, Cuff Size: Adult)   Pulse 59   Temp 97.8 °F (36.6 °C) (Temporal)   Resp 18   Ht 157.5 cm (62.01\")   Wt 65.3 kg (144 lb)   SpO2 100%   BMI 26.33 kg/m²       Physical Exam  Constitutional:       General: She is not in acute distress.     Appearance: Normal appearance.   HENT:      Head: Normocephalic and atraumatic.   Eyes:      Extraocular Movements: Extraocular movements intact.   Skin:     Findings: No rash.   Neurological:      General: No focal deficit present.      Mental Status: She is alert.   Psychiatric:         Mood and Affect: Mood normal.                    Assessment and Plan   Diagnoses and all orders for this visit:    1. Screening examination for STI (Primary)  -     Chlamydia trachomatis, Neisseria gonorrhoeae, PCR w/ confirmation - Swab, Vagina; Future  -     RPR Qualitative with Reflex to Quant; Future  -     HIV-1 / O / 2 Ag / Antibody; Future  -     Hepatitis B Virus (HBV) Screening and Diagnosis; Future  -     Hepatitis C Antibody; Future  -     Chlamydia trachomatis, Neisseria gonorrhoeae, PCR w/ confirmation - Urine, Urine, Clean Catch    2. Annual physical exam  -     HPV Vaccine  -     Pneumococcal Conjugate Vaccine 20-Valent (PCV20)    Other orders  -     clotrimazole-betamethasone (LOTRISONE) 1-0.05 % cream; Apply 1 Application topically to the appropriate area as directed 2 " (Two) Times a Day.  Dispense: 1 each; Refill: 0      No known exposure or sx so we will just test. Return 4 weeks for recheck.      Lotrisone for circular lesion lateral left thigh examined with anh crouch in the room.     Return in about 4 weeks (around 4/8/2025).       Dianna Garcia D.O.  [facility] [place]

## 2025-03-16 LAB
C TRACH RRNA SPEC QL NAA+PROBE: NEGATIVE
N GONORRHOEA RRNA SPEC QL NAA+PROBE: NEGATIVE

## 2025-03-17 ENCOUNTER — TELEPHONE (OUTPATIENT)
Dept: SLEEP MEDICINE | Age: 25
End: 2025-03-17
Payer: COMMERCIAL

## 2025-03-17 NOTE — TELEPHONE ENCOUNTER
Spoke with patient regarding sleep study results and is understanding and does not want to move forward with and in lab sleep study at this time.

## 2025-05-07 ENCOUNTER — LAB (OUTPATIENT)
Dept: LAB | Facility: HOSPITAL | Age: 25
End: 2025-05-07
Payer: COMMERCIAL

## 2025-05-07 ENCOUNTER — OFFICE VISIT (OUTPATIENT)
Dept: FAMILY MEDICINE CLINIC | Facility: CLINIC | Age: 25
End: 2025-05-07
Payer: COMMERCIAL

## 2025-05-07 VITALS
TEMPERATURE: 97.7 F | SYSTOLIC BLOOD PRESSURE: 106 MMHG | OXYGEN SATURATION: 99 % | BODY MASS INDEX: 26.68 KG/M2 | HEART RATE: 82 BPM | WEIGHT: 145 LBS | DIASTOLIC BLOOD PRESSURE: 64 MMHG | HEIGHT: 62 IN

## 2025-05-07 DIAGNOSIS — Z86.19 HISTORY OF HELICOBACTER PYLORI INFECTION: ICD-10-CM

## 2025-05-07 DIAGNOSIS — K58.0 IRRITABLE BOWEL SYNDROME WITH DIARRHEA: Primary | ICD-10-CM

## 2025-05-07 DIAGNOSIS — Z11.3 ENCOUNTER FOR SCREENING EXAMINATION FOR SEXUALLY TRANSMITTED INFECTION: ICD-10-CM

## 2025-05-07 LAB — HIV 1+2 AB+HIV1 P24 AG SERPL QL IA: NORMAL

## 2025-05-07 PROCEDURE — 86704 HEP B CORE ANTIBODY TOTAL: CPT

## 2025-05-07 PROCEDURE — 87798 DETECT AGENT NOS DNA AMP: CPT | Performed by: PHYSICIAN ASSISTANT

## 2025-05-07 PROCEDURE — 86706 HEP B SURFACE ANTIBODY: CPT

## 2025-05-07 PROCEDURE — 87591 N.GONORRHOEAE DNA AMP PROB: CPT | Performed by: PHYSICIAN ASSISTANT

## 2025-05-07 PROCEDURE — 1159F MED LIST DOCD IN RCRD: CPT | Performed by: PHYSICIAN ASSISTANT

## 2025-05-07 PROCEDURE — 87491 CHLMYD TRACH DNA AMP PROBE: CPT | Performed by: PHYSICIAN ASSISTANT

## 2025-05-07 PROCEDURE — 86803 HEPATITIS C AB TEST: CPT

## 2025-05-07 PROCEDURE — 87340 HEPATITIS B SURFACE AG IA: CPT

## 2025-05-07 PROCEDURE — 99214 OFFICE O/P EST MOD 30 MIN: CPT | Performed by: PHYSICIAN ASSISTANT

## 2025-05-07 PROCEDURE — 1160F RVW MEDS BY RX/DR IN RCRD: CPT | Performed by: PHYSICIAN ASSISTANT

## 2025-05-07 PROCEDURE — 1126F AMNT PAIN NOTED NONE PRSNT: CPT | Performed by: PHYSICIAN ASSISTANT

## 2025-05-07 PROCEDURE — 36415 COLL VENOUS BLD VENIPUNCTURE: CPT

## 2025-05-07 PROCEDURE — 87661 TRICHOMONAS VAGINALIS AMPLIF: CPT | Performed by: PHYSICIAN ASSISTANT

## 2025-05-07 PROCEDURE — G0432 EIA HIV-1/HIV-2 SCREEN: HCPCS

## 2025-05-07 PROCEDURE — 87801 DETECT AGNT MULT DNA AMPLI: CPT | Performed by: PHYSICIAN ASSISTANT

## 2025-05-07 PROCEDURE — 86592 SYPHILIS TEST NON-TREP QUAL: CPT

## 2025-05-07 RX ORDER — DICYCLOMINE HYDROCHLORIDE 10 MG/1
20 CAPSULE ORAL
Qty: 240 CAPSULE | Refills: 0 | Status: SHIPPED | OUTPATIENT
Start: 2025-05-07

## 2025-05-07 NOTE — ASSESSMENT & PLAN NOTE
Will do trial of dicyclomine, 10 to 20 mg up to 4 times daily.  Discussed she may also use Imodium as needed for persistent diarrhea.  Encouraged to return to the clinic for new or worsening symptoms or development of systemic symptoms such as fever, chills, nausea, vomiting, bloody stools.

## 2025-05-07 NOTE — PROGRESS NOTES
Follow Up Office Visit      Patient Name: Alaina Kennedy  : 2000   MRN: [id number]     Chief Complaint:    Chief Complaint   Patient presents with    patient wanting STD panel       History of Present Illness  The patient presents for evaluation of irritable bowel syndrome (IBS) and STD screening.    She has been experiencing persistent diarrhea for several weeks, which she attributes to her IBS. The frequency of bowel movements is inconsistent, with periods of cessation followed by multiple episodes within a day. Cramping accompanies these episodes.There are no systemic symptoms such as fever, chills, or body aches.  She denies any blood in the stool.  She describes constant abdominal discomfort that is not alleviated by dietary changes or electrolyte supplementation. She has not previously received any medication for her IBS symptoms.     She expresses interest in undergoing a comprehensive STD screen.. She reports having new sexual partners, though denies any symptoms suggestive of an STD.    She has a history of H. pylori infection a few years ago and suspects a recurrence due to early satiety and postprandial nausea, symptoms she experienced during her previous infection.  She is interested in repeat testing today.        Subjective      I have reviewed and the following portions of the patient's history were updated as appropriate: past family history, past medical history, past social history, past surgical history and problem list.    Medications:     Current Outpatient Medications:     ARIPiprazole (ABILIFY) 5 MG tablet, Take 1 tablet by mouth Every Night., Disp: , Rfl:     sertraline (ZOLOFT) 25 MG tablet, Take 1 tablet by mouth Daily., Disp: , Rfl:     dicyclomine (BENTYL) 10 MG capsule, Take 2 capsules by mouth 4 (Four) Times a Day Before Meals & at Bedtime., Disp: 240 capsule, Rfl: 0    Allergies:   No Known Allergies    Objective     Physical Exam:   Physical Exam  Constitutional:   "     General: She is not in acute distress.     Appearance: She is not ill-appearing.   HENT:      Head: Normocephalic.      Left Ear: Ear canal normal.   Cardiovascular:      Rate and Rhythm: Normal rate and regular rhythm.      Heart sounds: No murmur heard.  Pulmonary:      Effort: Pulmonary effort is normal. No respiratory distress.      Breath sounds: No wheezing, rhonchi or rales.   Abdominal:      General: Abdomen is flat. There is no distension.      Palpations: There is no mass.      Tenderness: There is abdominal tenderness (Right lower quadrant, mild). There is no guarding or rebound.   Musculoskeletal:         General: Normal range of motion.   Skin:     General: Skin is warm.   Neurological:      General: No focal deficit present.      Mental Status: She is alert.   Psychiatric:         Mood and Affect: Mood normal.         Vital Signs:   Vitals:    05/07/25 1333   BP: 106/64   Pulse: 82   Temp: 97.7 °F (36.5 °C)   TempSrc: Infrared   SpO2: 99%   Weight: 65.8 kg (145 lb)   Height: 157.5 cm (62.01\")   PainSc: 0-No pain     Body mass index is 26.51 kg/m².         Procedures    Assessment / Plan         Assessment and Plan     Diagnoses and all orders for this visit:    1. Irritable bowel syndrome with diarrhea (Primary)  Assessment & Plan:  Will do trial of dicyclomine, 10 to 20 mg up to 4 times daily.  Discussed she may also use Imodium as needed for persistent diarrhea.  Encouraged to return to the clinic for new or worsening symptoms or development of systemic symptoms such as fever, chills, nausea, vomiting, bloody stools.    Orders:  -     dicyclomine (BENTYL) 10 MG capsule; Take 2 capsules by mouth 4 (Four) Times a Day Before Meals & at Bedtime.  Dispense: 240 capsule; Refill: 0    2. Encounter for screening examination for sexually transmitted infection  -     Hepatitis B Virus (HBV) Screening and Diagnosis; Future  -     HIV-1 / O / 2 Ag / Antibody; Future  -     RPR Qualitative with Reflex to " Quant; Future  -     HCV Antibody Rfx To Qnt PCR; Future  -     NuSwab VG+ - Swab, Vagina; Future  -     NuSwab VG+ - Swab, Vagina    3. History of Helicobacter pylori infection  -     H. Pylori Breath Test - Breath, Lung             Follow Up:   Return in about 4 weeks (around 6/4/2025) for IBS.    Patient or patient representative verbalized consent for the use of Ambient Listening during the visit with  Jovanna Navarrete PA-C for chart documentation. 5/7/2025  13:51 EDT    Jovanna Navarrete PA-C    [facility] [place] [facility] [place]

## 2025-05-08 LAB — RPR SER QL: NORMAL

## 2025-05-09 LAB
HBV CORE AB SERPL QL IA: NEGATIVE
HBV SURFACE AB SER QL: NON REACTIVE
HBV SURFACE AG SERPL QL IA: NEGATIVE
HCV AB SERPL QL IA: NON REACTIVE
HCV AB SERPL QL IA: NORMAL
IMP & REVIEW OF LAB RESULTS: NORMAL
LABORATORY COMMENT REPORT: NORMAL

## 2025-07-08 ENCOUNTER — HOSPITAL ENCOUNTER (EMERGENCY)
Facility: HOSPITAL | Age: 25
Discharge: HOME | End: 2025-07-09
Attending: STUDENT IN AN ORGANIZED HEALTH CARE EDUCATION/TRAINING PROGRAM | Admitting: STUDENT IN AN ORGANIZED HEALTH CARE EDUCATION/TRAINING PROGRAM
Payer: COMMERCIAL

## 2025-07-08 DIAGNOSIS — J02.0 STREP PHARYNGITIS: Primary | ICD-10-CM

## 2025-07-08 LAB
B PARAPERT DNA SPEC QL NAA+PROBE: NOT DETECTED
B PERT DNA SPEC QL NAA+PROBE: NOT DETECTED
B-HCG UR QL: NEGATIVE
BILIRUB UR QL STRIP: NEGATIVE
C PNEUM DNA NPH QL NAA+NON-PROBE: NOT DETECTED
CLARITY UR: CLEAR
COLOR UR: YELLOW
EXPIRATION DATE: NORMAL
FLUAV SUBTYP SPEC NAA+PROBE: NOT DETECTED
FLUBV RNA NPH QL NAA+NON-PROBE: NOT DETECTED
GLUCOSE UR STRIP-MCNC: NEGATIVE MG/DL
HADV DNA SPEC NAA+PROBE: NOT DETECTED
HCOV 229E RNA SPEC QL NAA+PROBE: NOT DETECTED
HCOV HKU1 RNA SPEC QL NAA+PROBE: NOT DETECTED
HCOV NL63 RNA SPEC QL NAA+PROBE: NOT DETECTED
HCOV OC43 RNA SPEC QL NAA+PROBE: NOT DETECTED
HGB UR QL STRIP.AUTO: NEGATIVE
HMPV RNA NPH QL NAA+NON-PROBE: NOT DETECTED
HPIV1 RNA ISLT QL NAA+PROBE: NOT DETECTED
HPIV2 RNA SPEC QL NAA+PROBE: NOT DETECTED
HPIV3 RNA NPH QL NAA+PROBE: NOT DETECTED
HPIV4 P GENE NPH QL NAA+PROBE: NOT DETECTED
INTERNAL NEGATIVE CONTROL: NORMAL
INTERNAL POSITIVE CONTROL: NORMAL
KETONES UR QL STRIP: NEGATIVE
LEUKOCYTE ESTERASE UR QL STRIP.AUTO: NEGATIVE
Lab: NORMAL
M PNEUMO IGG SER IA-ACNC: NOT DETECTED
NITRITE UR QL STRIP: NEGATIVE
PH UR STRIP.AUTO: 7 [PH] (ref 5–8)
PROT UR QL STRIP: NEGATIVE
RHINOVIRUS RNA SPEC NAA+PROBE: NOT DETECTED
RSV RNA NPH QL NAA+NON-PROBE: NOT DETECTED
S PYO AG THROAT QL: POSITIVE
SARS-COV-2 RNA RESP QL NAA+PROBE: NOT DETECTED
SP GR UR STRIP: 1.02 (ref 1–1.03)
UROBILINOGEN UR QL STRIP: NORMAL

## 2025-07-08 PROCEDURE — 81025 URINE PREGNANCY TEST: CPT | Performed by: STUDENT IN AN ORGANIZED HEALTH CARE EDUCATION/TRAINING PROGRAM

## 2025-07-08 PROCEDURE — 96372 THER/PROPH/DIAG INJ SC/IM: CPT

## 2025-07-08 PROCEDURE — 87880 STREP A ASSAY W/OPTIC: CPT | Performed by: STUDENT IN AN ORGANIZED HEALTH CARE EDUCATION/TRAINING PROGRAM

## 2025-07-08 PROCEDURE — 99283 EMERGENCY DEPT VISIT LOW MDM: CPT | Performed by: STUDENT IN AN ORGANIZED HEALTH CARE EDUCATION/TRAINING PROGRAM

## 2025-07-08 PROCEDURE — 81003 URINALYSIS AUTO W/O SCOPE: CPT | Performed by: STUDENT IN AN ORGANIZED HEALTH CARE EDUCATION/TRAINING PROGRAM

## 2025-07-08 PROCEDURE — 0202U NFCT DS 22 TRGT SARS-COV-2: CPT | Performed by: STUDENT IN AN ORGANIZED HEALTH CARE EDUCATION/TRAINING PROGRAM

## 2025-07-08 PROCEDURE — 25010000002 KETOROLAC TROMETHAMINE PER 15 MG: Performed by: STUDENT IN AN ORGANIZED HEALTH CARE EDUCATION/TRAINING PROGRAM

## 2025-07-08 RX ORDER — ACETAMINOPHEN 500 MG
1000 TABLET ORAL ONCE
Status: COMPLETED | OUTPATIENT
Start: 2025-07-08 | End: 2025-07-08

## 2025-07-08 RX ORDER — KETOROLAC TROMETHAMINE 30 MG/ML
30 INJECTION, SOLUTION INTRAMUSCULAR; INTRAVENOUS ONCE
Status: COMPLETED | OUTPATIENT
Start: 2025-07-08 | End: 2025-07-08

## 2025-07-08 RX ORDER — AMOXICILLIN 875 MG/1
875 TABLET, COATED ORAL ONCE
Status: COMPLETED | OUTPATIENT
Start: 2025-07-09 | End: 2025-07-09

## 2025-07-08 RX ADMIN — ACETAMINOPHEN 1000 MG: 500 TABLET ORAL at 22:16

## 2025-07-08 RX ADMIN — KETOROLAC TROMETHAMINE 30 MG: 30 INJECTION, SOLUTION INTRAMUSCULAR; INTRAVENOUS at 22:16

## 2025-07-08 NOTE — Clinical Note
[facility] EMERGENCY [facility]PART[facility] [place]  [address]  [place] [address]  Phone: [phone]  Fax: [phone]    Alaina Kennedy was seen and treated in our emergency department on 7/8/2025.  She may return to work on 07/13/2025.         Thank you for choosing [facility].    Valeriy Rogel MD

## 2025-07-09 VITALS
DIASTOLIC BLOOD PRESSURE: 72 MMHG | WEIGHT: 143 LBS | BODY MASS INDEX: 25.34 KG/M2 | RESPIRATION RATE: 20 BRPM | SYSTOLIC BLOOD PRESSURE: 110 MMHG | HEIGHT: 63 IN | TEMPERATURE: 98.7 F | HEART RATE: 72 BPM | OXYGEN SATURATION: 98 %

## 2025-07-09 RX ORDER — AMOXICILLIN 875 MG/1
875 TABLET, COATED ORAL 2 TIMES DAILY
Qty: 14 TABLET | Refills: 0 | Status: SHIPPED | OUTPATIENT
Start: 2025-07-09 | End: 2025-07-16

## 2025-07-09 RX ADMIN — AMOXICILLIN 875 MG: 875 TABLET, FILM COATED ORAL at 00:00

## 2025-07-09 NOTE — FSED PROVIDER NOTE
Subjective   History of Present Illness  23 yo F for two days now with body aches, sore throat, mild frontal headache, denies neck pain, no difficulty swallowing no change in voice.  Denies shortness of breath or chest pain.  Denies dysuria she also was concerned about being pregnant had unprotected intercourse within the past week she is in the middle of her current cycle explained to her that while we can test her it is not confirmatory that she is not pregnant    History provided by:  Patient      Review of Systems   All other systems reviewed and are negative.      Past Medical History:   Diagnosis Date    Anxiety     Chlamydia     a year ago, treated    Cholelithiasis     Depression     Gonorrhea     a year ago, treated    H. pylori infection        No Known Allergies    Past Surgical History:   Procedure Laterality Date    WISDOM TOOTH EXTRACTION         Family History   Problem Relation Age of Onset    Diabetes Maternal Grandfather        Social History     Socioeconomic History    Marital status: Single   Tobacco Use    Smoking status: Some Days     Current packs/day: 0.50     Average packs/day: 0.5 packs/day for 7.1 years (3.5 ttl pk-yrs)     Types: Cigarettes     Start date: 6/5/2018    Smokeless tobacco: Never   Vaping Use    Vaping status: Never Used   Substance and Sexual Activity    Alcohol use: Yes     Comment: socially     Drug use: Not Currently     Types: Marijuana     Comment: last use July 2020    Sexual activity: Yes     Partners: Female, Male     Birth control/protection: None           Objective   Physical Exam  Vitals and nursing note reviewed.   Constitutional:       General: She is not in acute distress.     Appearance: She is not ill-appearing.   HENT:      Head: Normocephalic.      Mouth/Throat:      Mouth: Mucous membranes are moist.      Pharynx: Posterior oropharyngeal erythema present.      Comments: Mildly inflamed tonsils bilaterally no evidence of abscess peritonsillar or tonsillar  no exudate no asymmetry oropharynx is patent  Eyes:      Conjunctiva/sclera: Conjunctivae normal.   Cardiovascular:      Rate and Rhythm: Normal rate.      Pulses: Normal pulses.      Heart sounds: Normal heart sounds.   Pulmonary:      Effort: Pulmonary effort is normal.      Breath sounds: Normal breath sounds.   Abdominal:      General: Abdomen is flat. There is no distension.      Palpations: Abdomen is soft.      Tenderness: There is no abdominal tenderness. There is no guarding or rebound.   Musculoskeletal:         General: No swelling. Normal range of motion.      Cervical back: Normal range of motion and neck supple. No rigidity or tenderness.   Skin:     General: Skin is warm and dry.      Capillary Refill: Capillary refill takes less than 2 seconds.      Findings: No rash.   Neurological:      General: No focal deficit present.      Mental Status: She is alert and oriented to person, place, and time.         Procedures           ED Course                                           Medical Decision Making  24-year-old female with fever and symptoms of pharyngitis viral URI and strep swab ordered in triage per protocol.  Differential includes viral pharyngitis, tonsillar abscess, peritonsillar abscess, strep pharyngitis, UTI, and others    Patient is overall well-appearing and nontoxic on initial exam labs are positive for strep a remainder of workup is unremarkable given first dose of amoxicillin will be discharged with amoxicillin following Toradol and Tylenol she feels much better ready to go home    Problems Addressed:  Strep pharyngitis: complicated acute illness or injury    Amount and/or Complexity of Data Reviewed  Labs: ordered.    Risk  OTC drugs.  Prescription drug management.        Final diagnoses:   Strep pharyngitis       ED Disposition  ED Disposition       ED Disposition   Discharge    Condition   Stable    Comment   --               No follow-up provider specified.       Medication List         New Prescriptions      amoxicillin 875 MG tablet  Commonly known as: AMOXIL  Take 1 tablet by mouth 2 (Two) Times a Day for 7 days.               Where to Get Your Medications        These medications were sent to [id number] - [place], [place] - [address] AT [facility] [address] - [phone]  - [phone]   [address], [facility] [address]      Phone: [phone]   amoxicillin 875 MG tablet

## 2025-07-09 NOTE — DISCHARGE INSTRUCTIONS
Alternate Tylenol and ibuprofen for pain and fever finish the entire course of amoxicillin follow-up with primary care as needed return to the ER for any new or worsening symptoms

## 2025-07-16 ENCOUNTER — OFFICE VISIT (OUTPATIENT)
Dept: FAMILY MEDICINE CLINIC | Facility: CLINIC | Age: 25
End: 2025-07-16
Payer: COMMERCIAL

## 2025-07-16 ENCOUNTER — LAB (OUTPATIENT)
Dept: LAB | Facility: HOSPITAL | Age: 25
End: 2025-07-16
Payer: COMMERCIAL

## 2025-07-16 VITALS
HEIGHT: 63 IN | DIASTOLIC BLOOD PRESSURE: 60 MMHG | TEMPERATURE: 97.8 F | OXYGEN SATURATION: 100 % | BODY MASS INDEX: 25.52 KG/M2 | WEIGHT: 144 LBS | SYSTOLIC BLOOD PRESSURE: 108 MMHG | HEART RATE: 70 BPM

## 2025-07-16 DIAGNOSIS — Z20.2 POSSIBLE EXPOSURE TO STD: Primary | ICD-10-CM

## 2025-07-16 DIAGNOSIS — Z20.2 POSSIBLE EXPOSURE TO STD: ICD-10-CM

## 2025-07-16 DIAGNOSIS — R30.0 DYSURIA: ICD-10-CM

## 2025-07-16 LAB
B-HCG UR QL: NEGATIVE
BILIRUB BLD-MCNC: NEGATIVE MG/DL
CLARITY, POC: CLEAR
COLOR UR: YELLOW
EXPIRATION DATE: ABNORMAL
EXPIRATION DATE: NORMAL
GLUCOSE UR STRIP-MCNC: NEGATIVE MG/DL
HCV AB SER QL: NORMAL
HIV 1+2 AB+HIV1 P24 AG SERPL QL IA: NORMAL
INTERNAL NEGATIVE CONTROL: NORMAL
INTERNAL POSITIVE CONTROL: NORMAL
KETONES UR QL: NEGATIVE
LEUKOCYTE EST, POC: NEGATIVE
Lab: ABNORMAL
Lab: NORMAL
NITRITE UR-MCNC: NEGATIVE MG/ML
PH UR: 6 [PH] (ref 5–8)
PROT UR STRIP-MCNC: NEGATIVE MG/DL
RBC # UR STRIP: ABNORMAL /UL
SP GR UR: 1.02 (ref 1–1.03)
UROBILINOGEN UR QL: NORMAL

## 2025-07-16 PROCEDURE — 99213 OFFICE O/P EST LOW 20 MIN: CPT | Performed by: PHYSICIAN ASSISTANT

## 2025-07-16 PROCEDURE — 87491 CHLMYD TRACH DNA AMP PROBE: CPT | Performed by: PHYSICIAN ASSISTANT

## 2025-07-16 PROCEDURE — 86695 HERPES SIMPLEX TYPE 1 TEST: CPT

## 2025-07-16 PROCEDURE — 87340 HEPATITIS B SURFACE AG IA: CPT

## 2025-07-16 PROCEDURE — G0432 EIA HIV-1/HIV-2 SCREEN: HCPCS

## 2025-07-16 PROCEDURE — 36415 COLL VENOUS BLD VENIPUNCTURE: CPT

## 2025-07-16 PROCEDURE — 1126F AMNT PAIN NOTED NONE PRSNT: CPT | Performed by: PHYSICIAN ASSISTANT

## 2025-07-16 PROCEDURE — 81025 URINE PREGNANCY TEST: CPT | Performed by: PHYSICIAN ASSISTANT

## 2025-07-16 PROCEDURE — 86706 HEP B SURFACE ANTIBODY: CPT

## 2025-07-16 PROCEDURE — 86696 HERPES SIMPLEX TYPE 2 TEST: CPT

## 2025-07-16 PROCEDURE — 86592 SYPHILIS TEST NON-TREP QUAL: CPT

## 2025-07-16 PROCEDURE — 87591 N.GONORRHOEAE DNA AMP PROB: CPT | Performed by: PHYSICIAN ASSISTANT

## 2025-07-16 PROCEDURE — 86704 HEP B CORE ANTIBODY TOTAL: CPT

## 2025-07-16 PROCEDURE — 86803 HEPATITIS C AB TEST: CPT

## 2025-07-16 NOTE — PROGRESS NOTES
Follow Up Office Visit      Date: 2025   Patient Name: Alaina Kennedy  : 2000   MRN: [id number]     Chief Complaint:    Chief Complaint   Patient presents with    STD testing       History of Present Illness  The patient is a 24-year-old female presenting today for STD screening.    She has had 4 sexual partners recently, both male and female, and occasionally uses protection. She is not currently using any form of contraception. She had a negative pregnancy test at the hospital last week and reports starting her menstrual period yesterday. Her last sexual encounter was on 2025, during which she believes she was ovulating. She is unaware of any STDs in her sexual partners. She has a history of chlamydia and gonorrhea and has been diagnosed with HSV-1, although she has never observed any symptoms. She reports having had 5 pregnancies, resulting in 2 living children, 1 miscarriage, and 2 abortions.    She reports experiencing intermittent burning during urination for the past few days, which she suspects may be related to her recent antibiotic use. She does not experience frequent or urgent urination, fevers, chills, new or worsening vaginal discharge, vaginal bleeding, abdominal pain, nausea, or vomiting.    Sexual Practices: She has had 4 sexual partners recently, both male and female, and occasionally uses protection.    GYNECOLOGICAL HISTORY:  - Last Menstrual Period: 07/15/2025      Subjective      Review of Systems:   Review of Systems   Constitutional:  Negative for chills and fever.   Gastrointestinal:  Negative for abdominal pain, nausea and vomiting.   Genitourinary:  Positive for dysuria. Negative for flank pain, frequency, hematuria, pelvic pain, urgency, vaginal bleeding, vaginal discharge and vaginal pain.       I have reviewed the patients family history, social history, past medical history, past surgical history and have updated it as appropriate.     Medications:  "    Current Outpatient Medications:     amoxicillin (AMOXIL) 875 MG tablet, Take 1 tablet by mouth 2 (Two) Times a Day for 7 days., Disp: 14 tablet, Rfl: 0    ARIPiprazole (ABILIFY) 5 MG tablet, Take 1 tablet by mouth Every Night., Disp: , Rfl:     dicyclomine (BENTYL) 10 MG capsule, Take 2 capsules by mouth 4 (Four) Times a Day Before Meals & at Bedtime., Disp: 240 capsule, Rfl: 0    sertraline (ZOLOFT) 25 MG tablet, Take 1 tablet by mouth Daily., Disp: , Rfl:     Allergies:   No Known Allergies    Objective     Physical Exam: Please see above  Vital Signs:   Vitals:    07/16/25 1043   BP: 108/60   Pulse: 70   Temp: 97.8 °F (36.6 °C)   TempSrc: Infrared   SpO2: 100%   Weight: 65.3 kg (144 lb)   Height: 160 cm (62.99\")   PainSc: 0-No pain     Body mass index is 25.51 kg/m².          Physical Exam  Vitals and nursing note reviewed.   Constitutional:       General: She is not in acute distress.     Appearance: Normal appearance. She is not ill-appearing, toxic-appearing or diaphoretic.   Cardiovascular:      Rate and Rhythm: Normal rate and regular rhythm.      Heart sounds: No murmur heard.  Pulmonary:      Effort: Pulmonary effort is normal. No respiratory distress.      Breath sounds: Normal breath sounds. No wheezing.   Abdominal:      General: Abdomen is flat. Bowel sounds are normal. There is no distension.      Palpations: Abdomen is soft.      Tenderness: There is abdominal tenderness (suprapubic, mild). There is no right CVA tenderness, left CVA tenderness or guarding.   Neurological:      General: No focal deficit present.      Mental Status: She is alert and oriented to person, place, and time.   Psychiatric:         Mood and Affect: Mood normal.         Behavior: Behavior normal.         Results:   Labs:   Hemoglobin A1C   Date Value Ref Range Status   10/28/2024 4.90 4.80 - 5.60 % Final     TSH   Date Value Ref Range Status   10/28/2024 1.060 0.270 - 4.200 uIU/mL Final        POCT Results (if applicable): "   Results for orders placed or performed in visit on 07/16/25   POCT urinalysis dipstick, automated    Collection Time: 07/16/25 11:15 AM    Specimen: Urine   Result Value Ref Range    Color Yellow Yellow, Straw, Dark Yellow, Purnima    Clarity, UA Clear Clear    Specific Gravity  1.020 1.005 - 1.030    pH, Urine 6.0 5.0 - 8.0    Leukocytes Negative Negative    Nitrite, UA Negative Negative    Protein, POC Negative Negative mg/dL    Glucose, UA Negative Negative mg/dL    Ketones, UA Negative Negative    Urobilinogen, UA Normal Normal, 0.2 E.U./dL    Bilirubin Negative Negative    Blood, UA 1+ (A) Negative    Lot Number [id number]     Expiration Date 09/19/2025    POCT pregnancy, urine    Collection Time: 07/16/25 11:18 AM    Specimen: Urine   Result Value Ref Range    HCG, Urine, QL Negative Negative    Lot Number [id number]     Internal Positive Control Passed Positive, Passed    Internal Negative Control Passed Negative, Passed    Expiration Date 07/24/2026        Assessment / Plan      Assessment/Plan:   1. Possible exposure to STD  - Vital signs stable.  Physical exam significant for mild suprapubic tenderness to palpation.  Patient is engaging in high risk sexual behavior with multiple sexual partners and has reported history of prior chlamydia, gonorrhea, and HSV-1 infections.  - Urinalysis remarkable for 1+ blood, which is expected given history of recently starting menstrual period.  Urine pregnancy negative.   - Additional STI screening to be completed.  - Discussed importance of safe sex.  Patient advised to abstain from sexual activity until lab results are available. Emphasized hydration.   - Reviewed when to seek immediate emergency medical attention including with development of fever, chills, worsening abdominal pain, vomiting, among others.  -Patient verbalizes understanding and agreement with treatment plan.  All questions answered.  - HIV-1/O/2 ANTIGEN/ANTIBODY, 4TH GENERATION; Future  - HSV 1 and  2-Specific Ab, IgG; Future  - RPR; Future  - POCT pregnancy, urine  - POCT urinalysis dipstick, automated  - Hepatitis B Virus (HBV) Screening and Diagnosis; Future  - Hepatitis C antibody; Future  - Chlamydia trachomatis, Neisseria gonorrhoeae, PCR w/ confirmation - Urine, Vagina; Future  - Chlamydia trachomatis, Neisseria gonorrhoeae, PCR w/ confirmation - Urine, Vagina    2. Dysuria  - POCT pregnancy, urine  - POCT urinalysis dipstick, automated       Follow Up:   Return if symptoms worsen or fail to improve.    Patient or patient representative verbalized consent for the use of Ambient Listening during the visit with  Yeyo Banks PA-C for chart documentation. 7/16/2025  11:43 EDT    RALPH Coleman

## 2025-07-17 ENCOUNTER — RESULTS FOLLOW-UP (OUTPATIENT)
Dept: FAMILY MEDICINE CLINIC | Facility: CLINIC | Age: 25
End: 2025-07-17
Payer: COMMERCIAL

## 2025-07-17 DIAGNOSIS — A54.9 GONORRHEA: Primary | ICD-10-CM

## 2025-07-17 LAB
HBV CORE AB SERPL QL IA: NEGATIVE
HBV SURFACE AB SER QL: NON REACTIVE
HBV SURFACE AG SERPL QL IA: NEGATIVE
HSV1 IGG SERPL QL IA: REACTIVE
HSV2 IGG SERPL QL IA: NON REACTIVE
IMP & REVIEW OF LAB RESULTS: NORMAL
LABORATORY COMMENT REPORT: NORMAL
RPR SER QL: NORMAL

## 2025-07-24 LAB
C TRACH RRNA SPEC QL NAA+PROBE: NEGATIVE
N GONORRHOEA RRNA SPEC QL NAA+PROBE: ABNORMAL

## 2025-07-24 RX ORDER — CEFTRIAXONE 1 G/1
500 INJECTION, POWDER, FOR SOLUTION INTRAMUSCULAR; INTRAVENOUS ONCE
Status: SHIPPED | OUTPATIENT
Start: 2025-07-25

## 2025-07-24 NOTE — TELEPHONE ENCOUNTER
Attempted to call pt to discuss lab results. No answer. Left non-detailed message to have pt call back.

## 2025-07-24 NOTE — TELEPHONE ENCOUNTER
"Reviewed test result with pt showing gonorrhea testing is \"equivocal\".  I recommended that we treat with Rocephin 500 mg IM once. Reviewed risks of IM antibiotic including injection site reaction, pain, bleeding, among others.  Patient verbalizes understanding and wishes to proceed with injection.  She tells me that she will not be able to return to clinic for injection until tomorrow. Future order placed today for ceftriaxone injection to be completed tomorrow.  Patient was advised to notify any sexual partners so that they can seek appropriate treatment. Patient verbalizes understanding.  All questions answered.  "